# Patient Record
Sex: FEMALE | Race: WHITE | NOT HISPANIC OR LATINO | Employment: FULL TIME | URBAN - METROPOLITAN AREA
[De-identification: names, ages, dates, MRNs, and addresses within clinical notes are randomized per-mention and may not be internally consistent; named-entity substitution may affect disease eponyms.]

---

## 2017-06-23 ENCOUNTER — ALLSCRIPTS OFFICE VISIT (OUTPATIENT)
Dept: OTHER | Facility: OTHER | Age: 22
End: 2017-06-23

## 2017-06-23 ENCOUNTER — APPOINTMENT (OUTPATIENT)
Dept: RADIOLOGY | Facility: MEDICAL CENTER | Age: 22
End: 2017-06-23
Payer: COMMERCIAL

## 2017-06-23 ENCOUNTER — TRANSCRIBE ORDERS (OUTPATIENT)
Dept: ADMINISTRATIVE | Facility: HOSPITAL | Age: 22
End: 2017-06-23

## 2017-06-23 DIAGNOSIS — M25.552 PAIN IN LEFT HIP: ICD-10-CM

## 2017-06-23 DIAGNOSIS — M25.551 PAIN IN RIGHT HIP: ICD-10-CM

## 2017-06-23 PROCEDURE — 73521 X-RAY EXAM HIPS BI 2 VIEWS: CPT

## 2017-06-24 LAB
ANTI-NUCLEAR ANTIBODY (ANA) (HISTORICAL): NEGATIVE
C-REACT.PROTEIN,QUANT (HISTORICAL): 2.1 MG/L (ref 0–4.9)
ERYTHROCYTE SEDIMENTATION RATE (HISTORICAL): 8 MM/HR (ref 0–32)
LYME IGG/IGM AB (HISTORICAL): <0.91 ISR (ref 0–0.9)
LYME IGM (HISTORICAL): <0.8 INDEX (ref 0–0.79)

## 2017-06-26 LAB
BASOPHILS # BLD AUTO: 0.1 X10E3/UL (ref 0–0.2)
BASOPHILS # BLD AUTO: 1 %
CYCLIC CITRULLINATED PEPTIDE ANTIBODY (HISTORICAL): 6 UNITS (ref 0–19)
DEPRECATED RDW RBC AUTO: 12.6 % (ref 12.3–15.4)
EOSINOPHIL # BLD AUTO: 0 %
EOSINOPHIL # BLD AUTO: 0 X10E3/UL (ref 0–0.4)
HCT VFR BLD AUTO: 40.8 % (ref 34–46.6)
HGB BLD-MCNC: 13.8 G/DL (ref 11.1–15.9)
LYMPHOCYTES # BLD AUTO: 2.1 X10E3/UL (ref 0.7–3.1)
LYMPHOCYTES # BLD AUTO: 27 %
MCH RBC QN AUTO: 29.7 PG (ref 26.6–33)
MCHC RBC AUTO-ENTMCNC: 33.8 G/DL (ref 31.5–35.7)
MCV RBC AUTO: 88 FL (ref 79–97)
MONOCYTES # BLD AUTO: 0.8 X10E3/UL (ref 0.1–0.9)
MONOCYTES (HISTORICAL): 10 %
NEUTROPHILS # BLD AUTO: 4.9 X10E3/UL (ref 1.4–7)
NEUTROPHILS # BLD AUTO: 62 %
PLATELET # BLD AUTO: 254 X10E3/UL (ref 150–379)
PLATELET COMMENT (HISTORICAL): NORMAL
RA LATEX TURBID (HISTORICAL): <10 IU/ML (ref 0–13.9)
RBC (HISTORICAL): 4.65 X10E6/UL (ref 3.77–5.28)
RBC COMMENT (HISTORICAL): NORMAL
WBC # BLD AUTO: 7.9 X10E3/UL (ref 3.4–10.8)

## 2017-06-27 ENCOUNTER — GENERIC CONVERSION - ENCOUNTER (OUTPATIENT)
Dept: OTHER | Facility: OTHER | Age: 22
End: 2017-06-27

## 2017-06-28 ENCOUNTER — GENERIC CONVERSION - ENCOUNTER (OUTPATIENT)
Dept: OTHER | Facility: OTHER | Age: 22
End: 2017-06-28

## 2017-09-23 ENCOUNTER — ALLSCRIPTS OFFICE VISIT (OUTPATIENT)
Dept: OTHER | Facility: OTHER | Age: 22
End: 2017-09-23

## 2017-09-29 LAB
ADEQUACY: (HISTORICAL): NORMAL
CHLAMYDIA DFA, NAA OR PCR (HISTORICAL): NEGATIVE
CLINICIAN PROVIDIED ICD 9 OR 10 (HISTORICAL): NORMAL
COMMENT (HISTORICAL): NORMAL
DIAGNOSIS (HISTORICAL): NORMAL
GC, DNA PROB (HISTORICAL): NEGATIVE
HPV HIGH RISK (HISTORICAL): NEGATIVE
NOTE: (HISTORICAL): NORMAL
PERFORMED BY (HISTORICAL): NORMAL
QC REVIEWED BY (HISTORICAL): NORMAL
TEST INFORMATION (HISTORICAL): NORMAL

## 2017-10-27 NOTE — PROGRESS NOTES
Assessment  1  Well female exam with routine gynecological exam (V72 31) (Z01 419)    Plan   · Norgestim-Eth Estrad Triphasic 0 18/0 215/0 25 MG-35 MCG Oral Tablet (Ortho  Tri-Cyclen (28)); take 1 tablet every day   Rx By: Debby Roth; Dispense: 0 Days ; #:1 X 28 Tablet Disp Pack (3 Disp Packs); Refill: 3;For: Irregular menstrual cycle; NELL = N; Verified Transmission to [facility]; Last Updated By: System, Joosy; 9/23/2017 10:02:38 AM   · () PapIG, CtNg, HPV, rfx 16/18; Status:Active; Requested NNE:[id number];    Perform:[facility]; NCS:[id number]; Last Updated Migel Dalton; 9/23/2017 10:04:38 AM;Ordered; For:Well female exam with routine gynecological exam; Ordered By:Jesus Bennett;    Discussion/Summary  health maintenance visit Currently, she eats a healthy diet and has an adequate exercise regimen  the risks and benefits of cervical cancer screening were discussed Pap test with reflex HPV testing was done today Testing was done today for chlamydia and gonorrhea  Breast cancer screening: the risks and benefits of breast cancer screening were discussed and self breast exam technique was taught  Colorectal cancer screening: colorectal cancer screening is not indicated  Osteoporosis screening: bone mineral density testing is not indicated  Advice and education were given regarding nutrition, aerobic exercise, vitamin D supplements and contraception  Patient is a 60-year-old female seen today for a well-woman exam  She is sexually active but she has had the same partner for the past 4 years  She denies any vaginal or urinary complaints  We did do a Pap smear with reflex to include screening for GC and Chlamydia  I refilled her birth control pills and she will return in 1 year  She did not want a flu shot today  Possible side effects of new medications were reviewed with the patient/guardian today  The treatment plan was reviewed with the patient/guardian   The patient/guardian understands and agrees with the treatment plan      Chief Complaint  Patient presents for a GYN exam       History of Present Illness  HPI: Patient has been on birth control for irregular periods - she is happy with this pill - menses are regular, no cramping  is sexually active  She has had the same partner for four years  GYN HM, Adult Female [facility]: The patient is being seen for a gynecology evaluation  General Health: The patient's health since the last visit is described as good  Lifestyle:  She consumes a diverse and healthy diet  -- She exercises regularly  -- She does not use tobacco -- She consumes alcohol -- She denies drug use  -- Goes to the gym three days a week  Reproductive health:  she reports menstrual problems  -- she is sexually active  Screening:      Review of Systems  no pelvic pain,-- no nonmenstrual bleeding-- and-- no dysuria  Active Problems  1  Chronic left hip pain (356 70,315 69) (M25 552,G89 29)   2  Chronic pain of right hip (260 25,525 59) (M25 551,G89 29)   3   Irregular menstrual cycle (626 4) (N92 6)    Past Medical History   · History of Birth control counseling (V25 09) (Z30 09)   · History of Common wart (078 19) (B07 8)   · History of Encounter for birth control pills maintenance (V25 41) (Z30 41)   · History of Ganglion cyst (727 43) (M67 40)   · History of dental abscess (V12 79) (Z87 19)   · History of influenza vaccination (V49 89) (Z92 29)   · History of migraine with aura (V12 49) (Z86 69)   · History of Left knee pain (719 46) (M25 562)   · History of Need for DTaP vaccine (V06 1) (Z23)   · History of Need for prophylactic vaccination and inoculation against influenza (V04 81)  (Z23)   · History of Physical Medicine Office Visit   · History of Screening for depression (V79 0) (Z13 89)    Surgical History   · History of Dental Surgery    Family History   · Family history of No known health problems   · Family history of No known health problems   · Family history of malignant neoplasm of ovary (V16 41) (Z80 41)   · Family history of malignant neoplasm of prostate (V16 42) (Z80 42)   · Family history of skin cancer (V16 8) (Z80 8)    The family history was reviewed and updated today  Social History   · Never a smoker  The social history was reviewed and updated today  The social history was reviewed and is unchanged  Current Meds   1  Diclofenac Sodium 75 MG Oral Tablet Delayed Release; Take 1 tablet twice daily for   pain  take with food; Therapy: 35STC5536 to (Evaluate:15Qka6490)  Requested for: 93IDY9406; Last   Rx:23Jun2017 Ordered   2  Fluticasone Propionate 50 MCG/ACT Nasal Suspension; 2 sprays each nostril daily; Therapy: 51Jff1078 to (Last Rx:57Rnw7998)  Requested for: 89Knj2824 Ordered   3  Norgestim-Eth Estrad Triphasic 0 18/0 215/0 25 MG-35 MCG Oral Tablet; take 1 tablet   every day; Therapy: 39ZEE6998 to (Last Rx:29Blv0360)  Requested for: 54TFB2655 Ordered    Allergies  1  sumatriptan   2  Penicillins   3  Clindamycin HCl CAPS    Vitals   Recorded: 11QGR2299 09:29AM   Temperature 97 9 F, Tympanic   Heart Rate 112   Pulse Quality Normal   Systolic 957, LUE, Sitting   Diastolic 70, LUE, Sitting   Height 5 ft 11 in   Weight 159 lb 5 oz   BMI Calculated 22 22   BSA Calculated 1 91   O2 Saturation 99, RA   LMP 65Gvh2428     Physical Exam    Constitutional   General appearance: No acute distress, well appearing and well nourished  Neck   Neck: Normal, supple, trachea midline, no masses  Thyroid: Normal, no thyromegaly  Pulmonary   Respiratory effort: No increased work of breathing or signs of respiratory distress  Auscultation of lungs: Clear to auscultation  Cardiovascular   Auscultation of heart: Normal rate and rhythm, normal S1 and S2, no murmurs  Peripheral vascular exam: Normal pulses Throughout  Genitourinary   External genitalia: Normal and no lesions appreciated      Vagina: Normal, no lesions or dryness appreciated  Vagina: moderate,-- white-- and-- thick vaginal discharge  Cervix: Normal, no palpable masses  Uterus: Normal, non-tender, not enlarged, and no palpable masses  Adnexa/parametria: Normal, non-tender and no fullness or masses appreciated  Anus, perineum, and rectum: Normal sphincter tone, no masses, and no prolapse  Chest   Breasts: Normal and no dimpling or skin changes noted  Abdomen   Abdomen: Normal, non-tender, and no organomegaly noted  Lymphatic   Palpation of lymph nodes in neck, axillae, groin and/or other locations: No lymphadenopathy or masses noted  Skin   Skin and subcutaneous tissue: Normal skin turgor and no rashes  Psychiatric   Orientation to person, place, and time: Normal     Mood and affect: Normal        Results/Data  PHQ-9 Adult Depression Screening 88Rdz2908 09:40AM User, s     Test Name Result Flag Reference   PHQ-9 Adult Depression Score 2     Over the last two weeks, how often have you been bothered by any of the following problems? Little interest or pleasure in doing things: Not at all - 0  Feeling down, depressed, or hopeless: Not at all - 0  Trouble falling or staying asleep, or sleeping too much: Several days - 1  Feeling tired or having little energy: Several days - 1  Poor appetite or over eating: Not at all - 0  Feeling bad about yourself - or that you are a failure or have let yourself or your family down: Not at all - 0  Trouble concentrating on things, such as reading the newspaper or watching television: Not at all - 0  Moving or speaking so slowly that other people could have noticed   Or the opposite -  being so fidgety or restless that you have been moving around a lot more than usual: Not at all - 0  Thoughts that you would be better off dead, or of hurting yourself in some way: Not at all - 0   PHQ-9 Adult Depression Screening Negative     PHQ-9 Difficulty Level Not difficult at all     PHQ-9 Severity Minimal Depression Signatures   Electronically signed by : Eli Costello DO; Sep 25 2017  6:49AM EST                       (Author)

## 2017-12-14 ENCOUNTER — ALLSCRIPTS OFFICE VISIT (OUTPATIENT)
Dept: OTHER | Facility: OTHER | Age: 22
End: 2017-12-14

## 2017-12-18 ENCOUNTER — ALLSCRIPTS OFFICE VISIT (OUTPATIENT)
Dept: OTHER | Facility: OTHER | Age: 22
End: 2017-12-18

## 2017-12-19 NOTE — PROGRESS NOTES
Assessment  1  AOM (acute otitis media) (382 9) (H65 90)    Plan  AOM (acute otitis media)    · Fluticasone Propionate 50 MCG/ACT Nasal Suspension; USE 2 SPRAYS IN EACHNOSTRIL ONCE DAILY   · LevoFLOXacin 500 MG Oral Tablet; TAKE 1 TABLET DAILY    Discussion/Summary    Patient is a 60-year-old female1  AOM - patient's symptoms today appear likely secondary to biliary infection  Continue with supportive care  Maintain proper hydration  Take over-the-counter Mucinex as well as fluticasone  Start treatment with Levaquin 500 mg daily for 7 days  Follow up if symptoms persist       Chief Complaint  Patient presents for poss sinus infection  Patient C/O ear blockage, pain on left side of face  Patient C/O cough and nausea  Patient denies any vomiting or fevers  History of Present Illness  Cold Symptoms:   Cristo Stanton presents with complaints of gradual onset of constant episodes of mild cold symptoms  Episodes started 6 days ago  Her symptoms are caused by no known event  Symptoms are improved by OTC cold medications, but not by lying down  Associated symptoms include sore throat,-- productive cough,-- facial pressure,-- facial pain,-- headache-- and-- ear pain, but-- no fever-- and-- no chills  Review of Systems   Constitutional: feeling poorly-- and-- feeling tired  ENT: sore throat-- and-- nasal discharge  Cardiovascular: no chest pain-- and-- no palpitations  Active Problems  1  Chronic left hip pain (297 94,644 31) (M25 552,G89 29)   2  Chronic pain of right hip (632 21,331 39) (M25 551,G89 29)   3  Irregular menstrual cycle (626 4) (N92 6)   4  Need for tuberculosis vaccination (V03 2) (Z23)   5  Well female exam with routine gynecological exam (V72 31) (Z01 419)    Past Medical History  1  History of Birth control counseling (V25 09) (Z30 09)   2  History of Common wart (078 19) (B07 8)   3  History of Encounter for birth control pills maintenance (V25 41) (Z30 41)   4   History of Ganglion cyst (727 43) (M67 40)   5  History of dental abscess (V12 79) (Z87 19)   6  History of influenza vaccination (V49 89) (Z92 29)   7  History of migraine with aura (V12 49) (Z86 69)   8  History of Left knee pain (719 46) (M25 562)   9  History of Need for DTaP vaccine (V06 1) (Z23)   10  History of Need for prophylactic vaccination and inoculation against influenza (V04 81)  (Z23)   11  History of Physical Medicine Office Visit   12  History of Screening for depression (V79 0) (Z13 89)    Family History  Mother    1  Family history of No known health problems  Father    2  Family history of No known health problems  Maternal Grandmother    3  Family history of malignant neoplasm of ovary (V16 41) (Z80 41)  Grandfather    4  Family history of malignant neoplasm of prostate (V16 42) (Z80 42)   5  Family history of skin cancer (V16 8) (Z80 8)  Family History Reviewed: The family history was reviewed and updated today  Social History   · Never a smoker  The social history was reviewed and updated today  The social history was reviewed and is unchanged  Surgical History  1  History of Dental Surgery  Surgical History Reviewed: The surgical history was reviewed and updated today  Current Meds   1  Diclofenac Sodium 75 MG Oral Tablet Delayed Release; Take 1 tablet twice daily for pain  take with food; Therapy: 61MRJ3603 to (Evaluate:49Vjy4439)  Requested for: 23NIW4120; Last Rx:23Jun2017 Ordered   2  Fluticasone Propionate 50 MCG/ACT Nasal Suspension; 2 sprays each nostril daily; Therapy: 83Ixe6484 to (Last Rx:36Ihx1329)  Requested for: 96Ocy1097 Ordered   3  Norgestim-Eth Estrad Triphasic 0 18/0 215/0 25 MG-35 MCG Oral Tablet; take 1 tablet every day; Therapy: 62MJU7668 to (Last Rx:72Jrf0844)  Requested for: 29Iad6438 Ordered    The medication list was reviewed and updated today  Allergies  1  sumatriptan   2  Penicillins   3   Clindamycin HCl CAPS    Vitals   Recorded: 58VJN2583 08:15AM   Temperature 99 3 F, Tympanic   Heart Rate 104   Pulse Quality Normal   Respiration Quality Normal   Respiration 16   Systolic 262, LUE, Sitting   Diastolic 72, LUE, Sitting   Height 5 ft 11 26 in   Weight 172 lb 1 oz   BMI Calculated 23 82   BSA Calculated 1 98   O2 Saturation 99, RA   LMP 08OGQ5703   Pain Scale 0     Physical Exam   Constitutional  General appearance: No acute distress, well appearing and well nourished  Eyes  Conjunctiva and lids: No swelling, erythema or discharge  Pupils and irises: Equal, round and reactive to light  Ears, Nose, Mouth, and Throat  External inspection of ears and nose: Normal    Otoscopic examination: Abnormal   The left tympanic membrane was red,-- was bulging-- and-- was retracted  The right external canal was normal  The left external canal was normal   Nasal mucosa, septum, and turbinates: Normal without edema or erythema  Oropharynx: Normal with no erythema, edema, exudate or lesions  Pulmonary  Respiratory effort: No increased work of breathing or signs of respiratory distress  Auscultation of lungs: Clear to auscultation  Cardiovascular  Auscultation of heart: Normal rate and rhythm, normal S1 and S2, without murmurs  Examination of extremities for edema and/or varicosities: Normal    Abdomen  Abdomen: Non-tender, no masses  Liver and spleen: No hepatomegaly or splenomegaly  Lymphatic  Palpation of lymph nodes in neck: No lymphadenopathy  Musculoskeletal  Gait and station: Normal    Inspection/palpation of joints, bones, and muscles: Normal    Skin  Skin and subcutaneous tissue: Normal without rashes or lesions  Signatures   Electronically signed by :  [employer]; Dec 18 2017  9:21AM EST                       (Author)

## 2018-01-12 VITALS
DIASTOLIC BLOOD PRESSURE: 80 MMHG | RESPIRATION RATE: 16 BRPM | HEIGHT: 72 IN | SYSTOLIC BLOOD PRESSURE: 110 MMHG | BODY MASS INDEX: 21.04 KG/M2 | HEART RATE: 90 BPM | TEMPERATURE: 99.3 F | OXYGEN SATURATION: 99 % | WEIGHT: 155.31 LBS

## 2018-01-12 VITALS
HEIGHT: 71 IN | WEIGHT: 159.31 LBS | OXYGEN SATURATION: 99 % | TEMPERATURE: 97.9 F | DIASTOLIC BLOOD PRESSURE: 70 MMHG | BODY MASS INDEX: 22.3 KG/M2 | SYSTOLIC BLOOD PRESSURE: 110 MMHG | HEART RATE: 112 BPM

## 2018-01-13 NOTE — RESULT NOTES
Verified Results  [facility]) CBC+Platelet+Hem Review 01DQF3553 12:00AM [place] [employer]     Test Name Result Flag Reference   WBC 7 9 x10E3/uL  3 4-10 8   RBC 4 65 x10E6/uL  3 77-5 28   Hemoglobin 13 8 g/dL  11 1-15 9   Hematocrit 40 8 %  34 0-46  6   MCV 88 fL  79-97   MCH 29 7 pg  26 6-33 0   MCHC 33 8 g/dL  31 5-35 7   RDW 12 6 %  12 3-15 4   Platelets 010 L59Z0/RP  150-379   Neutrophils 62 %     Lymphs 27 %     Monocytes 10 %     Eos 0 %     Basos 1 %     Neutrophils Absolute 4 9 X10E3/uL  1 4-7 0   Lymphs (Absolute) 2 1 X10E3/uL  0 7-3 1   Monocytes(Absolute) 0 8 X10E3/uL  0 1-0 9   Eos (Absolute Value) 0 0 X10E3/uL  0 0-0 4   Baso(Absolute) 0 1 X10E3/uL  0 0-0 2   RBC Comment RBC's appear normal   Normal   Platelet Comment Comment  Adequate   Platelet count verified by examination of peripheral blood smear  [facility]) Sedimentation Rate-Westergren 64GPT2007 12:00AM [place]     Test Name Result Flag Reference   Sedimentation Rate-Westergren 8 mm/hr  0-32     (1) C-REACTIVE PROTEIN 32EYC3903 12:00AM [facility]     Test Name Result Flag Reference   C-Reactive Protein, Quant 2 1 mg/L  0 0-4 9     (LC) Lyme Ab/Western Blot Reflex 07HKL4950 12:00AM [facility]     Test Name Result Flag Reference   Lyme IgG/IgM Ab <0 91 ISR  0 00-0 90   Negative         <0 91                                                 Equivocal  0 91 - 1 09                                                 Positive         >1 09   Lyme Disease Ab, Quant, IgM <0 80 index  0 00-0 79   Negative         <0 80                                                 Equivocal  0 80 - 1 19                                                 Positive         >1 19                  IgM levels may peak at 3-6 weeks post infection, then                  gradually decline       [facility]) MARCO w/Reflex if Positive 23Jun2017 12:00AM [place] [employer]     Test Name Result Flag Reference   MARCO Direct Negative  Negative     ([facility]) Rheumatoid Arthritis Profile 10MQA6172 12: Kobi Duran     Test Name Result Flag Reference   RA Latex Turbid   <10 0 IU/mL  0 0-13 9   CCP Antibodies IgG/IgA 6 units  0-19   Negative               <20                                           Weak positive      20 - 39                                           Moderate positive  40 - 59                                           Strong positive        >59

## 2018-01-23 VITALS
SYSTOLIC BLOOD PRESSURE: 100 MMHG | HEIGHT: 71 IN | OXYGEN SATURATION: 99 % | RESPIRATION RATE: 16 BRPM | WEIGHT: 172.06 LBS | HEART RATE: 104 BPM | TEMPERATURE: 99.3 F | DIASTOLIC BLOOD PRESSURE: 72 MMHG | BODY MASS INDEX: 24.09 KG/M2

## 2018-01-23 NOTE — RESULT NOTES
Verified Results  ([facility]) PapIG, CtNg, HPV, rfx 16/18 49MPZ4701 12:00AM Yarelisandrew [facility]   No  of containers  Marilia Monterroso 01 ThinPrep Vial     Test Name Result Flag Reference   DIAGNOSIS: Comment     NEGATIVE FOR INTRAEPITHELIAL LESION AND MALIGNANCY  THIS SPECIMEN WAS RESCREENED AS PART OF OUR  PROGRAM    Specimen adequacy: Comment     Satisfactory for evaluation  Endocervical and/or squamous metaplastic  cells (endocervical component) are present  Clinician provided ICD10: Comment     Z01 419   Performed by: Bill Saenz, Cytotechnologist (ASCP)   QC reviewed by: Rosas Esteban, Supervisory Cytotechnologist (ASCP)   Comm   Note: Comment     The Pap smear is a screening test designed to aid in the detection of  premalignant and malignant conditions of the uterine cervix  It is not a  diagnostic procedure and should not be used as the sole means of detecting  cervical cancer  Both false-positive and false-negative reports do occur  Test Methodology: Comment     This liquid based ThinPrep(R) pap test was screened with the  use of an image guided system  HPV, high-risk Negative  Negative   This high-risk HPV test detects thirteen high-risk types  (16/18/31/33/35/39/45/51/52/56/58/59/68) without differentiation  Chlamydia, Nuc  Acid Amp Negative  Negative   Gonococcus, Nuc   Acid Amp Negative  Negative       Plan  Encounter for PPD test    · Tubersol 5 UNIT/0 1ML Intradermal Solution

## 2018-08-01 ENCOUNTER — OFFICE VISIT (OUTPATIENT)
Dept: OBGYN CLINIC | Facility: MEDICAL CENTER | Age: 23
End: 2018-08-01
Payer: COMMERCIAL

## 2018-08-01 VITALS
WEIGHT: 181 LBS | BODY MASS INDEX: 24.52 KG/M2 | HEIGHT: 72 IN | DIASTOLIC BLOOD PRESSURE: 80 MMHG | SYSTOLIC BLOOD PRESSURE: 110 MMHG

## 2018-08-01 DIAGNOSIS — Z30.41 USES ORAL CONTRACEPTION: ICD-10-CM

## 2018-08-01 DIAGNOSIS — Z01.419 ENCOUNTER FOR GYNECOLOGICAL EXAMINATION: Primary | ICD-10-CM

## 2018-08-01 PROCEDURE — S0610 ANNUAL GYNECOLOGICAL EXAMINA: HCPCS | Performed by: OBSTETRICS & GYNECOLOGY

## 2018-08-01 RX ORDER — NORGESTIMATE AND ETHINYL ESTRADIOL 0.25-0.035
1 KIT ORAL
COMMUNITY
End: 2018-08-01 | Stop reason: SDUPTHER

## 2018-08-01 RX ORDER — NORGESTIMATE AND ETHINYL ESTRADIOL 0.25-0.035
1 KIT ORAL DAILY
Qty: 90 TABLET | Refills: 2 | Status: SHIPPED | OUTPATIENT
Start: 2018-08-01 | End: 2019-03-25 | Stop reason: SDUPTHER

## 2018-08-01 NOTE — PROGRESS NOTES
ASSESSMENT & PLAN: Chary Dickson is a 25 y o  [place]ayet[place] Rizo with normal gynecologic exam     1   Routine well woman exam done today  2  Pap and HPV:  The patient's last pap was 2017  It was normal     Pap was not done today  Current ASCCP Guidelines reviewed  3   STD testing  was not done   4  Gardasil recommendations reviewed  is vaccinated  5  The following were reviewed in today's visit: breast self exam, use and side effects of OCPs, family planning choices, exercise and healthy diet      CC:  Annual Gynecologic Examination    HPI: Chary Dickson is a 25 y o  [place]ayet[place] Rizo who presents for annual gynecologic examination  She has the following concerns:  None     Health Maintenance:    She wears her seatbelt routinely  She does perform regular monthly self breast exams  She feels safe at home  History reviewed  No pertinent past medical history  Past Surgical History:   Procedure Laterality Date    DENTAL SURGERY         OB/Gyn History:    Pt does not have menstrual issues  History of sexually transmitted infection: No   History of abnormal pap smears: No      Patient is currently sexually active  The current method of family planning is OCP (estrogen/progesterone)  OB History      Para Term  AB Living    0 0 0 0 0 0    SAB TAB Ectopic Multiple Live Births    0 0 0 0 0          Family History   Problem Relation Age of Onset    No Known Problems Mother     No Known Problems Father        Social History:  Social History     Social History    Marital status: Single     Spouse name: N/A    Number of children: N/A    Years of education: N/A     Occupational History    Not on file       Social History Main Topics    Smoking status: Never Smoker    Smokeless tobacco: Never Used    Alcohol use No    Drug use: No    Sexual activity: Yes     Partners: Male     Birth control/ protection: OCP     Other Topics Concern    Not on file     Social History Narrative    No narrative on file         Allergies   Allergen Reactions    Clindamycin Throat Swelling    Sumatriptan Nausea Only and Throat Swelling    Penicillins Rash         Current Outpatient Prescriptions:     norgestimate-ethinyl estradiol (ORTHO-CYCLEN) 0 25-35 MG-MCG per tablet, Take 1 tablet by mouth daily, Disp: 90 tablet, Rfl: 2    Review of Systems:  Constitutional :no fever, feels well, no tiredness, no recent weight gain or loss  ENT: no ear ache, no loss of hearing, no nosebleeds or nasal discharge, no sore throat or hoarseness  Cardiovascular: no complaints of slow or fast heart beat, no chest pain, no palpitations, no leg claudication or lower extremity edema  Respiratory: no complaints of shortness of shortness of breath, no LUNA  Breasts:no complaints of breast pain, breast lump, or nipple discharge  Gastrointestinal: no complaints of abdominal pain, constipation, nausea, vomiting, or diarrhea or bloody stools  Genitourinary : no complaints of dysuria, incontinence, pelvic pain, no dysmenorrhea, vaginal discharge or abnormal vaginal bleeding and as noted in HPI  Musculoskeletal: no complaints of arthralgia, no myalgia, no joint swelling or stiffness, no limb pain or swelling  Integumentary: no complaints of skin rash or lesion, itching or dry skin  Neurological: no complaints of headache, no confusion, no numbness or tingling, no dizziness or fainting    Objective      /80   Ht 6' (1 829 m)   Wt 82 1 kg (181 lb)   LMP 07/05/2018 (Exact Date)   Breastfeeding?  No   BMI 24 55 kg/m²     General:   appears stated age, cooperative, alert normal mood and affect   Heart: regular rate and rhythm, S1, S2 normal, no murmur, click, rub or gallop   Lungs: clear to auscultation bilaterally   Breasts: normal appearance, no masses or tenderness, Normal to palpation without dominant masses   Abdomen: soft, non-tender, without masses or organomegaly   Vulva: normal   Vagina: normal vagina, no discharge, exudate, lesion, or erythema   Urethra: normal   Cervix: Normal, no discharge  Nontender  Uterus: normal size, contour, position, consistency, mobility, non-tender   Adnexa: no mass, fullness, tenderness   Skin normal skin turgor and no rashes     Psychiatric orientation to person, place, and time: normal  mood and affect: normal

## 2018-08-07 RX ORDER — NORGESTIMATE AND ETHINYL ESTRADIOL 0.25-0.035
1 KIT ORAL DAILY
Qty: 28 TABLET | Refills: 0 | Status: SHIPPED | OUTPATIENT
Start: 2018-08-07 | End: 2019-06-10 | Stop reason: SDUPTHER

## 2019-03-25 DIAGNOSIS — Z30.41 USES ORAL CONTRACEPTION: ICD-10-CM

## 2019-03-25 RX ORDER — NORGESTIMATE AND ETHINYL ESTRADIOL 0.25-0.035
1 KIT ORAL DAILY
Qty: 84 TABLET | Refills: 0 | Status: SHIPPED | OUTPATIENT
Start: 2019-03-25 | End: 2019-07-08 | Stop reason: SDUPTHER

## 2019-06-10 DIAGNOSIS — Z30.41 USES ORAL CONTRACEPTION: ICD-10-CM

## 2019-06-10 RX ORDER — NORGESTIMATE AND ETHINYL ESTRADIOL 0.25-0.035
1 KIT ORAL DAILY
Qty: 28 TABLET | Refills: 1 | Status: SHIPPED | OUTPATIENT
Start: 2019-06-10 | End: 2019-08-19

## 2019-07-08 DIAGNOSIS — Z30.41 USES ORAL CONTRACEPTION: ICD-10-CM

## 2019-07-08 RX ORDER — NORGESTIMATE AND ETHINYL ESTRADIOL 0.25-0.035
1 KIT ORAL DAILY
Qty: 84 TABLET | Refills: 0 | Status: SHIPPED | OUTPATIENT
Start: 2019-07-08 | End: 2019-08-19

## 2019-07-26 ENCOUNTER — OFFICE VISIT (OUTPATIENT)
Dept: URGENT CARE | Facility: MEDICAL CENTER | Age: 24
End: 2019-07-26
Payer: COMMERCIAL

## 2019-07-26 VITALS
TEMPERATURE: 98 F | DIASTOLIC BLOOD PRESSURE: 70 MMHG | HEART RATE: 73 BPM | SYSTOLIC BLOOD PRESSURE: 140 MMHG | BODY MASS INDEX: 24.38 KG/M2 | WEIGHT: 180 LBS | RESPIRATION RATE: 16 BRPM | HEIGHT: 72 IN | OXYGEN SATURATION: 100 %

## 2019-07-26 DIAGNOSIS — R30.0 DYSURIA: ICD-10-CM

## 2019-07-26 DIAGNOSIS — N30.90 CYSTITIS: Primary | ICD-10-CM

## 2019-07-26 LAB
SL AMB  POCT GLUCOSE, UA: NEGATIVE
SL AMB LEUKOCYTE ESTERASE,UA: ABNORMAL
SL AMB POCT BILIRUBIN,UA: NEGATIVE
SL AMB POCT BLOOD,UA: NEGATIVE
SL AMB POCT CLARITY,UA: CLEAR
SL AMB POCT COLOR,UA: ABNORMAL
SL AMB POCT KETONES,UA: NEGATIVE
SL AMB POCT NITRITE,UA: ABNORMAL
SL AMB POCT PH,UA: 8
SL AMB POCT SPECIFIC GRAVITY,UA: 1
SL AMB POCT URINE PROTEIN: NEGATIVE
SL AMB POCT UROBILINOGEN: 0.2

## 2019-07-26 PROCEDURE — 87086 URINE CULTURE/COLONY COUNT: CPT

## 2019-07-26 PROCEDURE — G0383 LEV 4 HOSP TYPE B ED VISIT: HCPCS | Performed by: FAMILY MEDICINE

## 2019-07-26 PROCEDURE — 81002 URINALYSIS NONAUTO W/O SCOPE: CPT

## 2019-07-26 RX ORDER — NITROFURANTOIN 25; 75 MG/1; MG/1
100 CAPSULE ORAL 2 TIMES DAILY
Qty: 14 CAPSULE | Refills: 0 | Status: SHIPPED | OUTPATIENT
Start: 2019-07-26 | End: 2019-08-02

## 2019-07-26 NOTE — PROGRESS NOTES
[facility]        NAME: Sanjeev Hodgson is a 26 y o  female  : 1995    MRN: [id number]  DATE: 2019  TIME: 1:10 PM    Assessment and Plan   Cystitis [N30 90]  1  Cystitis  nitrofurantoin (MACROBID) 100 mg capsule   2  Dysuria  POCT urine dip    Urine culture    nitrofurantoin (MACROBID) 100 mg capsule         Patient Instructions       Follow up with PCP in 3-5 days  Proceed to  ER if symptoms worsen  Chief Complaint     Chief Complaint   Patient presents with    Possible UTI     since monday, frequency and burning         History of Present Illness       26 y o  female who complains of urinary frequency, urgency and dysuria x 5 days, without flank pain, fever, chills, or abnormal vaginal discharge or bleeding  Review of Systems   Review of Systems  As above     Current Medications       Current Outpatient Medications:     nitrofurantoin (MACROBID) 100 mg capsule, Take 1 capsule (100 mg total) by mouth 2 (two) times a day for 7 days, Disp: 14 capsule, Rfl: 0    norgestimate-ethinyl estradiol (ORTHO-CYCLEN) 0 25-35 MG-MCG per tablet, Take 1 tablet by mouth daily, Disp: 28 tablet, Rfl: 1    norgestimate-ethinyl estradiol (ORTHO-CYCLEN) 0 25-35 MG-MCG per tablet, Take 1 tablet by mouth daily, Disp: 84 tablet, Rfl: 0    Current Allergies     Allergies as of 2019 - Reviewed 2019   Allergen Reaction Noted    Clindamycin Throat Swelling     Sumatriptan Nausea Only and Throat Swelling 2016    Penicillins Rash 2012            The following portions of the patient's history were reviewed and updated as appropriate: allergies, current medications, past family history, past medical history, past social history, past surgical history and problem list      History reviewed  No pertinent past medical history      Past Surgical History:   Procedure Laterality Date    DENTAL SURGERY         Family History   Problem Relation Age of Onset    No Known Problems Mother  No Known Problems Father          Medications have been verified  Objective   /70   Pulse 73   Temp 98 °F (36 7 °C) (Tympanic)   Resp 16   Ht 6' (1 829 m)   Wt 81 6 kg (180 lb)   SpO2 100%   BMI 24 41 kg/m²        Physical Exam     Physical Exam   Constitutional: She is oriented to person, place, and time  She appears well-developed and well-nourished  HENT:   Head: Normocephalic and atraumatic  Mouth/Throat: Oropharynx is clear and moist    Eyes: Conjunctivae are normal    Neck: Neck supple  Cardiovascular: Normal rate, regular rhythm and normal heart sounds  Pulmonary/Chest: Effort normal and breath sounds normal  No respiratory distress  She has no wheezes  She has no rales  Abdominal: Soft  Musculoskeletal: Normal range of motion  Neurological: She is alert and oriented to person, place, and time  Skin: Skin is warm and dry  Psychiatric: She has a normal mood and affect  Her behavior is normal    Nursing note and vitals reviewed

## 2019-07-27 LAB — BACTERIA UR CULT: NORMAL

## 2019-08-19 ENCOUNTER — ANNUAL EXAM (OUTPATIENT)
Dept: OBGYN CLINIC | Facility: MEDICAL CENTER | Age: 24
End: 2019-08-19
Payer: COMMERCIAL

## 2019-08-19 VITALS
HEIGHT: 72 IN | DIASTOLIC BLOOD PRESSURE: 72 MMHG | WEIGHT: 185 LBS | BODY MASS INDEX: 25.06 KG/M2 | SYSTOLIC BLOOD PRESSURE: 116 MMHG

## 2019-08-19 DIAGNOSIS — Z01.419 ENCOUNTER FOR GYNECOLOGICAL EXAMINATION: Primary | ICD-10-CM

## 2019-08-19 DIAGNOSIS — Z30.41 USES ORAL CONTRACEPTION: ICD-10-CM

## 2019-08-19 PROCEDURE — 99395 PREV VISIT EST AGE 18-39: CPT | Performed by: OBSTETRICS & GYNECOLOGY

## 2019-08-19 RX ORDER — NORGESTIMATE AND ETHINYL ESTRADIOL 0.25-0.035
1 KIT ORAL DAILY
Qty: 84 TABLET | Refills: 3 | Status: SHIPPED | OUTPATIENT
Start: 2019-08-19 | End: 2020-08-17 | Stop reason: SDUPTHER

## 2019-08-19 NOTE — PROGRESS NOTES
ASSESSMENT & PLAN: Nish Ludwig is a 22 y o  [place] with normal gynecologic exam     1   Routine well woman exam done today  2  Pap:  The patient's last pap was 2017  It was normal     Pap was not done today  Current ASCCP Guidelines reviewed  3   STD testing  was not done   4  Gardasil recommendations reviewed   5  The following were reviewed in today's visit: breast self exam, use and side effects of OCPs, family planning choices, exercise and healthy diet    CC:  Annual Gynecologic Examination    HPI: Nish Ludwig is a 22 y o  [place] who presents for annual gynecologic examination  She has the following concerns:  None     Health Maintenance:    She wears her seatbelt routinely  She does perform regular monthly self breast exams  She feels safe at home  History reviewed  No pertinent past medical history  Past Surgical History:   Procedure Laterality Date    DENTAL SURGERY         OB/Gyn History:    Pt does not have menstrual issues  History of sexually transmitted infection: No   History of abnormal pap smears: No      Patient is currently sexually active  The current method of family planning is OCP (estrogen/progesterone)      OB History        0    Para   0    Term   0       0    AB   0    Living   0       SAB   0    TAB   0    Ectopic   0    Multiple   0    Live Births   0                 Family History   Problem Relation Age of Onset    No Known Problems Mother     No Known Problems Father        Social History:  Social History     Socioeconomic History    Marital status: Single     Spouse name: Not on file    Number of children: Not on file    Years of education: Not on file    Highest education level: Not on file   Occupational History    Not on file   Social Needs    Financial resource strain: Not on file    Food insecurity:     Worry: Not on file     Inability: Not on file    Transportation needs:     Medical: Not on file Non-medical: Not on file   Tobacco Use    Smoking status: Never Smoker    Smokeless tobacco: Never Used   Substance and Sexual Activity    Alcohol use: Yes     Comment: social    Drug use: No    Sexual activity: Yes     Partners: Male     Birth control/protection: OCP   Lifestyle    Physical activity:     Days per week: Not on file     Minutes per session: Not on file    Stress: Not on file   Relationships    Social connections:     Talks on phone: Not on file     Gets together: Not on file     Attends Yarsani service: Not on file     Active member of club or organization: Not on file     Attends meetings of clubs or organizations: Not on file     Relationship status: Not on file    Intimate partner violence:     Fear of current or ex partner: Not on file     Emotionally abused: Not on file     Physically abused: Not on file     Forced sexual activity: Not on file   Other Topics Concern    Not on file   Social History Narrative    Not on file         Allergies   Allergen Reactions    Clindamycin Throat Swelling    Sumatriptan Nausea Only and Throat Swelling    Penicillins Rash         Current Outpatient Medications:     norgestimate-ethinyl estradiol (ORTHO-CYCLEN) 0 25-35 MG-MCG per tablet, Take 1 tablet by mouth daily, Disp: 84 tablet, Rfl: 3    Review of Systems:  Constitutional :no fever, feels well, no tiredness, no recent weight gain or loss  ENT: no ear ache, no loss of hearing, no nosebleeds or nasal discharge, no sore throat or hoarseness  Cardiovascular: no complaints of slow or fast heart beat, no chest pain, no palpitations, no leg claudication or lower extremity edema    Respiratory: no complaints of shortness of shortness of breath, no LUNA  Breasts:no complaints of breast pain, breast lump, or nipple discharge  Gastrointestinal: no complaints of abdominal pain, constipation, nausea, vomiting, or diarrhea or bloody stools  Genitourinary : no complaints of dysuria, incontinence, pelvic pain, no dysmenorrhea, vaginal discharge or abnormal vaginal bleeding and as noted in HPI  Musculoskeletal: no complaints of arthralgia, no myalgia, no joint swelling or stiffness, no limb pain or swelling  Integumentary: no complaints of skin rash or lesion, itching or dry skin  Neurological: no complaints of headache, no confusion, no numbness or tingling, no dizziness or fainting    Objective      /72   Ht 6' (1 829 m)   Wt 83 9 kg (185 lb)   LMP 08/07/2019   BMI 25 09 kg/m²     General:   appears stated age, cooperative, alert normal mood and affect   Heart: regular rate and rhythm, S1, S2 normal, no murmur, click, rub or gallop   Lungs: clear to auscultation bilaterally   Breasts: normal appearance, no masses or tenderness   Abdomen: soft, non-tender, without masses or organomegaly   Vulva: normal   Vagina: normal vagina, no discharge, exudate, lesion, or erythema   Urethra: normal   Cervix: Normal, no discharge  Nontender     Uterus: normal size, contour, position, consistency, mobility, non-tender   Adnexa: no mass, fullness, tenderness   Psychiatric orientation to person, place, and time: normal  mood and affect: normal

## 2019-11-08 ENCOUNTER — OFFICE VISIT (OUTPATIENT)
Dept: FAMILY MEDICINE CLINIC | Facility: CLINIC | Age: 24
End: 2019-11-08
Payer: COMMERCIAL

## 2019-11-08 VITALS
BODY MASS INDEX: 26.11 KG/M2 | TEMPERATURE: 97.8 F | DIASTOLIC BLOOD PRESSURE: 82 MMHG | HEIGHT: 72 IN | WEIGHT: 192.8 LBS | OXYGEN SATURATION: 98 % | RESPIRATION RATE: 16 BRPM | HEART RATE: 101 BPM | SYSTOLIC BLOOD PRESSURE: 116 MMHG

## 2019-11-08 DIAGNOSIS — Z23 NEED FOR TDAP VACCINATION: ICD-10-CM

## 2019-11-08 DIAGNOSIS — Z00.00 PREVENTATIVE HEALTH CARE: Primary | ICD-10-CM

## 2019-11-08 DIAGNOSIS — Z11.1 SCREENING FOR TUBERCULOSIS: ICD-10-CM

## 2019-11-08 PROCEDURE — 90471 IMMUNIZATION ADMIN: CPT | Performed by: FAMILY MEDICINE

## 2019-11-08 PROCEDURE — 99395 PREV VISIT EST AGE 18-39: CPT | Performed by: FAMILY MEDICINE

## 2019-11-08 PROCEDURE — 86580 TB INTRADERMAL TEST: CPT | Performed by: FAMILY MEDICINE

## 2019-11-08 PROCEDURE — 90715 TDAP VACCINE 7 YRS/> IM: CPT | Performed by: FAMILY MEDICINE

## 2019-11-08 NOTE — PROGRESS NOTES
Assessment/Plan:   1  Preventative health care  Patient appears well today  Reviewed health maintenance measures with her  She she is up-to-date with her immunizations except for her Adacel vaccine  This was given today  She needs a PPD placed today for her school requirements  Follow-up in 48-72 hours to repeat this test   She was encouraged to continue with a strict diet and exercise plan  Continue with regular follow-up with her gyn  Follow-up in 1 year or p r n  2  Need for Tdap vaccination  - TDAP VACCINE GREATER THAN OR EQUAL TO 6YO IM    3  Screening for tuberculosis  - TB Skin Test           There are no diagnoses linked to this encounter  Subjective:       Chief Complaint   Patient presents with    Physical Exam     PPD       Patient ID: Syed Diaz is a 22 y o  female  Syed Diaz presents for health maintenance visit   22 y o  Female  [employer] student for speech path    He/she states that  level of health is:  good     Dental issues :no    Vision issues: no    Hearing issues: no    Up-to-date on immunizations: yes    Diet: good     Exercise:  intermittently    Tobacco: no    ETOH: Minimal     Illegal drugs: no          Review of Systems   Constitutional: Negative for activity change, chills, fatigue and fever  HENT: Negative for congestion, ear pain, sinus pressure and sore throat  Eyes: Negative for redness, itching and visual disturbance  Respiratory: Negative for cough and shortness of breath  Cardiovascular: Negative for chest pain and palpitations  Gastrointestinal: Negative for abdominal pain, diarrhea and nausea  Endocrine: Negative for cold intolerance and heat intolerance  Genitourinary: Negative for dysuria, flank pain and frequency  Musculoskeletal: Negative for arthralgias, back pain, gait problem and myalgias  Skin: Negative for color change  Allergic/Immunologic: Negative for environmental allergies     Neurological: Negative for dizziness, numbness and headaches  Psychiatric/Behavioral: Negative for behavioral problems and sleep disturbance  The following portions of the patient's history were reviewed and updated as appropriate : past family history, past medical history, past social history and past surgical history  Current Outpatient Medications:     norgestimate-ethinyl estradiol (ORTHO-CYCLEN) 0 25-35 MG-MCG per tablet, Take 1 tablet by mouth daily, Disp: 84 tablet, Rfl: 3         Objective:         Vitals:    11/08/19 0857   BP: 116/82   BP Location: Left arm   Patient Position: Sitting   Cuff Size: Adult   Pulse: 101   Resp: 16   Temp: 97 8 °F (36 6 °C)   TempSrc: Tympanic   SpO2: 98%   Weight: 87 5 kg (192 lb 12 8 oz)   Height: 6' (1 829 m)     Physical Exam   Constitutional: She is oriented to person, place, and time  She appears well-developed and well-nourished  HENT:   Head: Normocephalic and atraumatic  Nose: Nose normal    Mouth/Throat: No oropharyngeal exudate  Eyes: Pupils are equal, round, and reactive to light  Right eye exhibits no discharge  Left eye exhibits no discharge  Neck: Normal range of motion  Neck supple  No tracheal deviation present  Cardiovascular: Normal rate, regular rhythm and intact distal pulses  Exam reveals no gallop and no friction rub  No murmur heard  Pulses:       Dorsalis pedis pulses are 2+ on the right side, and 2+ on the left side  Posterior tibial pulses are 2+ on the right side, and 2+ on the left side  Pulmonary/Chest: Effort normal and breath sounds normal  No respiratory distress  She has no wheezes  She has no rales  Abdominal: Soft  Bowel sounds are normal  She exhibits no distension  There is no tenderness  There is no rebound and no guarding  Musculoskeletal: Normal range of motion  She exhibits no edema  Lymphadenopathy:        Head (right side): No submental and no submandibular adenopathy present          Head (left side): No submental and no submandibular adenopathy present  She has no cervical adenopathy  Right cervical: No superficial cervical, no deep cervical and no posterior cervical adenopathy present  Left cervical: No superficial cervical, no deep cervical and no posterior cervical adenopathy present  Neurological: She is alert and oriented to person, place, and time  No cranial nerve deficit or sensory deficit  Skin: Skin is warm, dry and intact  Psychiatric: Her speech is normal and behavior is normal  Judgment normal  Her mood appears not anxious  Cognition and memory are normal  She does not exhibit a depressed mood  Vitals reviewed

## 2019-11-11 ENCOUNTER — CLINICAL SUPPORT (OUTPATIENT)
Dept: FAMILY MEDICINE CLINIC | Facility: CLINIC | Age: 24
End: 2019-11-11

## 2019-11-11 DIAGNOSIS — Z11.1 ENCOUNTER FOR PPD SKIN TEST READING: Primary | ICD-10-CM

## 2019-11-11 LAB
INDURATION: 0 MM
TB SKIN TEST: NEGATIVE

## 2020-03-04 ENCOUNTER — TELEPHONE (OUTPATIENT)
Dept: FAMILY MEDICINE CLINIC | Facility: CLINIC | Age: 25
End: 2020-03-04

## 2020-03-04 NOTE — TELEPHONE ENCOUNTER
Pt called in and needs TB results from Nov 2019 faxed over to [facility]      Faxing to [phone]

## 2020-07-20 ENCOUNTER — OFFICE VISIT (OUTPATIENT)
Dept: FAMILY MEDICINE CLINIC | Facility: CLINIC | Age: 25
End: 2020-07-20
Payer: COMMERCIAL

## 2020-07-20 VITALS
OXYGEN SATURATION: 98 % | SYSTOLIC BLOOD PRESSURE: 110 MMHG | HEIGHT: 72 IN | TEMPERATURE: 97.9 F | DIASTOLIC BLOOD PRESSURE: 72 MMHG | BODY MASS INDEX: 25.52 KG/M2 | WEIGHT: 188.4 LBS | RESPIRATION RATE: 16 BRPM | HEART RATE: 101 BPM

## 2020-07-20 DIAGNOSIS — Z00.00 ANNUAL PHYSICAL EXAM: ICD-10-CM

## 2020-07-20 DIAGNOSIS — Z11.1 ENCOUNTER FOR PPD TEST: Primary | ICD-10-CM

## 2020-07-20 PROCEDURE — 86580 TB INTRADERMAL TEST: CPT | Performed by: FAMILY MEDICINE

## 2020-07-20 PROCEDURE — 99395 PREV VISIT EST AGE 18-39: CPT | Performed by: FAMILY MEDICINE

## 2020-07-20 NOTE — PROGRESS NOTES
ADULT ANNUAL [address] [facility]    NAME: Johana Agn  AGE: 22 y o  SEX: female  : 1995     DATE: 2020     Assessment and Plan:     Problem List Items Addressed This Visit     None      Visit Diagnoses     Encounter for PPD test    -  Primary    Relevant Orders    TB Skin Test (Completed)    Annual physical exam        BMI 25 0-25 9,adult              Immunizations and preventive care screenings were discussed with patient today  Appropriate education was printed on patient's after visit summary  Counseling:  Alcohol/drug use: discussed moderation in alcohol intake, the recommendations for healthy alcohol use, and avoidance of illicit drug use  Dental Health: discussed importance of regular tooth brushing, flossing, and dental visits  · Exercise: the importance of regular exercise/physical activity was discussed  Recommend exercise 3-5 times per week for at least 30 minutes  BMI Counseling: Body mass index is 25 91 kg/m²  The BMI is above normal  Nutrition recommendations include decreasing portion sizes, encouraging healthy choices of fruits and vegetables, decreasing fast food intake, consuming healthier snacks and limiting drinks that contain sugar  Exercise recommendations include moderate physical activity 150 minutes/week and exercising 3-5 times per week  No pharmacotherapy was ordered  Return in 1 year (on 2021)  Chief Complaint:     Chief Complaint   Patient presents with    Physical Exam     WORK PE AND TB TEST       History of Present Illness:     Adult Annual Physical   Patient here for a comprehensive physical exam  The patient reports no problems  Diet and Physical Activity  · Diet/Nutrition: well balanced diet  · Exercise: 3-4 times a week on average        Depression Screening  PHQ-9 Depression Screening    PHQ-9:    Frequency of the following problems over the past two weeks: Little interest or pleasure in doing things:  0 - not at all  Feeling down, depressed, or hopeless:  0 - not at all  PHQ-2 Score:  0       General Health  · Sleep: sleeps well  · Hearing: normal - bilateral   · Vision: no vision problems  · Dental: regular dental visits  Review of Systems:     Review of Systems   Constitutional: Negative for activity change, chills, fatigue and fever  HENT: Negative for congestion, ear pain, sinus pressure and sore throat  Eyes: Negative for redness, itching and visual disturbance  Respiratory: Negative for cough and shortness of breath  Cardiovascular: Negative for chest pain and palpitations  Gastrointestinal: Negative for abdominal pain, diarrhea and nausea  Endocrine: Negative for cold intolerance and heat intolerance  Genitourinary: Negative for dysuria, flank pain and frequency  Musculoskeletal: Negative for arthralgias, back pain, gait problem and myalgias  Skin: Negative for color change  Allergic/Immunologic: Negative for environmental allergies  Neurological: Negative for dizziness, numbness and headaches  Psychiatric/Behavioral: Negative for behavioral problems and sleep disturbance        Past Medical History:     Past Medical History:   Diagnosis Date    Common wart     Resolved 7/29/2016     Ganglion cyst     Resolved 7/29/2016     Migraine with aura     Resolved 12/27/2016       Past Surgical History:     Past Surgical History:   Procedure Laterality Date    DENTAL SURGERY        Social History:        Social History     Socioeconomic History    Marital status: Single     Spouse name: None    Number of children: None    Years of education: None    Highest education level: None   Occupational History    None   Social Needs    Financial resource strain: None    Food insecurity:     Worry: None     Inability: None    Transportation needs:     Medical: None     Non-medical: None   Tobacco Use    Smoking status: Never Smoker    Smokeless tobacco: Never Used   Substance and Sexual Activity    Alcohol use: Yes     Comment: social    Drug use: No    Sexual activity: Yes     Partners: Male     Birth control/protection: OCP   Lifestyle    Physical activity:     Days per week: None     Minutes per session: None    Stress: None   Relationships    Social connections:     Talks on phone: None     Gets together: None     Attends Mu-ism service: None     Active member of club or organization: None     Attends meetings of clubs or organizations: None     Relationship status: None    Intimate partner violence:     Fear of current or ex partner: None     Emotionally abused: None     Physically abused: None     Forced sexual activity: None   Other Topics Concern    None   Social History Narrative    None      Family History:     Family History   Problem Relation Age of Onset    No Known Problems Mother     No Known Problems Father     Ovarian cancer Maternal Grandmother     Prostate cancer Family     Skin cancer Family       Current Medications:     Current Outpatient Medications   Medication Sig Dispense Refill    norgestimate-ethinyl estradiol (ORTHO-CYCLEN) 0 25-35 MG-MCG per tablet Take 1 tablet by mouth daily 84 tablet 3     No current facility-administered medications for this visit  Allergies: Allergies   Allergen Reactions    Clindamycin Throat Swelling    Sumatriptan Nausea Only and Throat Swelling    Penicillins Rash      Physical Exam:     /72 (BP Location: Left arm, Patient Position: Sitting, Cuff Size: Adult)   Pulse 101   Temp 97 9 °F (36 6 °C) (Tympanic)   Resp 16   Ht 5' 11 5" (1 816 m)   Wt 85 5 kg (188 lb 6 4 oz)   LMP 06/29/2020   SpO2 98%   BMI 25 91 kg/m²     Physical Exam   Constitutional: She is oriented to person, place, and time  She appears well-developed and well-nourished  HENT:   Head: Normocephalic and atraumatic     Nose: Nose normal    Mouth/Throat: No oropharyngeal exudate  Eyes: Pupils are equal, round, and reactive to light  Right eye exhibits no discharge  Left eye exhibits no discharge  Neck: Normal range of motion  Neck supple  No tracheal deviation present  Cardiovascular: Normal rate, regular rhythm and intact distal pulses  Exam reveals no gallop and no friction rub  No murmur heard  Pulses:       Dorsalis pedis pulses are 2+ on the right side, and 2+ on the left side  Posterior tibial pulses are 2+ on the right side, and 2+ on the left side  Pulmonary/Chest: Effort normal and breath sounds normal  No respiratory distress  She has no wheezes  She has no rales  Abdominal: Soft  Bowel sounds are normal  She exhibits no distension  There is no tenderness  There is no rebound and no guarding  Musculoskeletal: Normal range of motion  She exhibits no edema  Lymphadenopathy:        Head (right side): No submental and no submandibular adenopathy present  Head (left side): No submental and no submandibular adenopathy present  She has no cervical adenopathy  Right cervical: No superficial cervical, no deep cervical and no posterior cervical adenopathy present  Left cervical: No superficial cervical, no deep cervical and no posterior cervical adenopathy present  Neurological: She is alert and oriented to person, place, and time  No cranial nerve deficit or sensory deficit  Skin: Skin is warm, dry and intact  Psychiatric: Her speech is normal and behavior is normal  Judgment normal  Her mood appears not anxious  Cognition and memory are normal  She does not exhibit a depressed mood  Vitals reviewed        Saul Alexander DO   [facility]

## 2020-07-20 NOTE — PATIENT INSTRUCTIONS

## 2020-07-22 ENCOUNTER — CLINICAL SUPPORT (OUTPATIENT)
Dept: FAMILY MEDICINE CLINIC | Facility: CLINIC | Age: 25
End: 2020-07-22

## 2020-07-22 DIAGNOSIS — Z11.1 ENCOUNTER FOR PPD SKIN TEST READING: Primary | ICD-10-CM

## 2020-07-22 LAB
INDURATION: 0 MM
TB SKIN TEST: NEGATIVE

## 2020-08-17 ENCOUNTER — ANNUAL EXAM (OUTPATIENT)
Dept: OBGYN CLINIC | Facility: MEDICAL CENTER | Age: 25
End: 2020-08-17
Payer: COMMERCIAL

## 2020-08-17 VITALS
BODY MASS INDEX: 25.87 KG/M2 | HEIGHT: 72 IN | TEMPERATURE: 98.6 F | DIASTOLIC BLOOD PRESSURE: 80 MMHG | SYSTOLIC BLOOD PRESSURE: 120 MMHG | WEIGHT: 191 LBS

## 2020-08-17 DIAGNOSIS — Z01.419 ENCOUNTER FOR ANNUAL ROUTINE GYNECOLOGICAL EXAMINATION: Primary | ICD-10-CM

## 2020-08-17 DIAGNOSIS — Z30.41 USES ORAL CONTRACEPTION: ICD-10-CM

## 2020-08-17 DIAGNOSIS — Z01.419 ENCOUNTER FOR WELL WOMAN EXAM WITH ROUTINE GYNECOLOGICAL EXAM: ICD-10-CM

## 2020-08-17 PROCEDURE — 3008F BODY MASS INDEX DOCD: CPT | Performed by: OBSTETRICS & GYNECOLOGY

## 2020-08-17 PROCEDURE — 1036F TOBACCO NON-USER: CPT | Performed by: OBSTETRICS & GYNECOLOGY

## 2020-08-17 PROCEDURE — 99395 PREV VISIT EST AGE 18-39: CPT | Performed by: OBSTETRICS & GYNECOLOGY

## 2020-08-17 RX ORDER — NORGESTIMATE AND ETHINYL ESTRADIOL 0.25-0.035
1 KIT ORAL DAILY
Qty: 84 TABLET | Refills: 3 | Status: SHIPPED | OUTPATIENT
Start: 2020-08-17 | End: 2021-03-08 | Stop reason: SDUPTHER

## 2020-08-17 NOTE — PROGRESS NOTES
ASSESSMENT & PLAN: Debbie Mays is a 26 y o  Almfrancisco Pratt with normal gynecologic exam     1   Routine well woman exam done today  2  Pap:  The patient's last pap was 2017  It was normal     Pap was done today  Current ASCCP Guidelines reviewed  =  3  STD testing  was not done   4  Gardasil recommendations reviewed  is vaccinated  5  The following were reviewed in today's visit: breast self exam, family planning choices, exercise and healthy diet      CC:  Annual Gynecologic Examination    HPI: Debbie Mays is a 26 y o  Almfrancisco Pratt who presents for annual gynecologic examination  She has the following concerns:  None     Health Maintenance:    She wears her seatbelt routinely  She does perform regular monthly self breast exams  She feels safe at home  Past Medical History:   Diagnosis Date    Common wart     Resolved 2016     Ganglion cyst     Resolved 2016     Migraine with aura     Resolved 2016        Past Surgical History:   Procedure Laterality Date    DENTAL SURGERY         OB/Gyn History:    Pt does not have menstrual issues  History of sexually transmitted infection: No   History of abnormal pap smears: No =  Patient is currently sexually active  The current method of family planning is OCP (estrogen/progesterone)      OB History        0    Para   0    Term   0       0    AB   0    Living   0       SAB   0    TAB   0    Ectopic   0    Multiple   0    Live Births   0                 Family History   Problem Relation Age of Onset    No Known Problems Mother     No Known Problems Father     Ovarian cancer Maternal Grandmother     Prostate cancer Family     Skin cancer Family        Social History:  Social History     Socioeconomic History    Marital status: Single     Spouse name: Not on file    Number of children: Not on file    Years of education: Not on file    Highest education level: Not on file   Occupational History    Not on file   Social Needs    Financial resource strain: Not on file    Food insecurity     Worry: Not on file     Inability: Not on file    Transportation needs     Medical: Not on file     Non-medical: Not on file   Tobacco Use    Smoking status: Never Smoker    Smokeless tobacco: Never Used   Substance and Sexual Activity    Alcohol use: Yes     Comment: social    Drug use: No    Sexual activity: Yes     Partners: Male     Birth control/protection: OCP   Lifestyle    Physical activity     Days per week: Not on file     Minutes per session: Not on file    Stress: Not on file   Relationships    Social connections     Talks on phone: Not on file     Gets together: Not on file     Attends Nondenominational service: Not on file     Active member of club or organization: Not on file     Attends meetings of clubs or organizations: Not on file     Relationship status: Not on file    Intimate partner violence     Fear of current or ex partner: Not on file     Emotionally abused: Not on file     Physically abused: Not on file     Forced sexual activity: Not on file   Other Topics Concern    Not on file   Social History Narrative    Not on file         Allergies   Allergen Reactions    Clindamycin Throat Swelling    Sumatriptan Nausea Only and Throat Swelling    Penicillins Rash         Current Outpatient Medications:     norgestimate-ethinyl estradiol (ORTHO-CYCLEN) 0 25-35 MG-MCG per tablet, Take 1 tablet by mouth daily, Disp: 84 tablet, Rfl: 3    Review of Systems:  Constitutional :no fever, feels well, no tiredness, no recent weight gain or loss  ENT: no ear ache, no loss of hearing, no nosebleeds or nasal discharge, no sore throat or hoarseness  Cardiovascular: no complaints of slow or fast heart beat, no chest pain, no palpitations, no leg claudication or lower extremity edema    Respiratory: no complaints of shortness of shortness of breath, no LUNA  Breasts:no complaints of breast pain, breast lump, or nipple discharge  Gastrointestinal: no complaints of abdominal pain, constipation, nausea, vomiting, or diarrhea or bloody stools  Genitourinary : no complaints of dysuria, incontinence, pelvic pain, no dysmenorrhea, vaginal discharge or abnormal vaginal bleeding and as noted in HPI  Musculoskeletal: no complaints of arthralgia, no myalgia, no joint swelling or stiffness, no limb pain or swelling  Integumentary: no complaints of skin rash or lesion, itching or dry skin  Neurological: no complaints of headache, no confusion, no numbness or tingling, no dizziness or fainting    Objective      /80   Temp 98 6 °F (37 °C)   Ht 6' (1 829 m)   Wt 86 6 kg (191 lb)   LMP 08/06/2020 (Approximate)   BMI 25 90 kg/m²     General:   appears stated age, cooperative, alert normal mood and affect   Lungs: Unlabored breathing    Breasts: normal appearance, no masses or tenderness   Abdomen: soft, non-tender, without masses or organomegaly   Vulva: normal   Vagina: normal vagina, no discharge, exudate, lesion, or erythema   Urethra: normal   Cervix: Normal, no discharge  Nontender  Uterus: normal size, contour, position, consistency, mobility, non-tender   Adnexa: no mass, fullness, tenderness   Lymphatic palpation of lymph nodes in neck, axilla, groin and/or other locations: no lymphadenopathy or masses noted   Skin normal skin turgor and no rashes     Psychiatric orientation to person, place, and time: normal  mood and affect: normal

## 2020-08-18 LAB
CLINICAL INFO: NORMAL
CYTO CVX: NORMAL
DATE PREVIOUS BX: NORMAL
LMP START DATE: NORMAL
SL AMB PREV. PAP:: NORMAL
SPECIMEN SOURCE CVX/VAG CYTO: NORMAL

## 2021-03-08 ENCOUNTER — TELEPHONE (OUTPATIENT)
Dept: OBGYN CLINIC | Facility: MEDICAL CENTER | Age: 26
End: 2021-03-08

## 2021-03-08 DIAGNOSIS — Z30.41 USES ORAL CONTRACEPTION: ICD-10-CM

## 2021-03-08 NOTE — TELEPHONE ENCOUNTER
Patient recently obtained new insurance and needs a new script of her ortho-cyclen to be sent to the following pharmacy instead of the current pharmacy on file       [facility]  [address] [facility], [address] [place] [address]

## 2021-03-09 RX ORDER — NORGESTIMATE AND ETHINYL ESTRADIOL 0.25-0.035
1 KIT ORAL DAILY
Qty: 84 TABLET | Refills: 1 | Status: SHIPPED | OUTPATIENT
Start: 2021-03-09 | End: 2021-07-16 | Stop reason: SDUPTHER

## 2021-07-16 ENCOUNTER — TELEPHONE (OUTPATIENT)
Dept: OBGYN CLINIC | Facility: MEDICAL CENTER | Age: 26
End: 2021-07-16

## 2021-07-16 DIAGNOSIS — Z30.41 USES ORAL CONTRACEPTION: ICD-10-CM

## 2021-07-16 RX ORDER — NORGESTIMATE AND ETHINYL ESTRADIOL 0.25-0.035
1 KIT ORAL DAILY
Qty: 84 TABLET | Refills: 1 | Status: SHIPPED | OUTPATIENT
Start: 2021-07-16 | End: 2021-11-09

## 2021-07-26 NOTE — RESULT NOTES
Verified Results  * XR HIPS BILATERAL 2 VW W PELVIS IF PERFORMED [id number] 02:28PM Manuelito Carias    Order Number: [id number]     Test Name Result Flag Reference   XR HIPS BILATERAL WITH AP PELVIS (Report)     BILATERAL HIPS AND PELVIS     INDICATION: Chronic bilateral lateral hip pain on and off for 4-6 months  COMPARISON: None     VIEWS: AP pelvis and coned down views of each hip     IMAGES: 5      FINDINGS:     No acute pelvic fracture or pathologic bone lesions  Visualized bony pelvis appears intact  LEFT HIP:   No significant degenerative changes  Bony alignment is maintained  Soft tissues are unremarkable  RIGHT HIP:   No significant degenerative changes  Bony alignment is maintained  Soft tissues are unremarkable  IMPRESSION:     Unremarkable hips and pelvis         Workstation performed: [id number]     Signed by:   Laurita Fitzpatrick DO   6/28/17 [Joint Pain] : joint pain [Negative] : Heme/Lymph

## 2021-09-15 ENCOUNTER — OFFICE VISIT (OUTPATIENT)
Dept: URGENT CARE | Facility: CLINIC | Age: 26
End: 2021-09-15
Payer: COMMERCIAL

## 2021-09-15 VITALS
WEIGHT: 195 LBS | BODY MASS INDEX: 26.41 KG/M2 | DIASTOLIC BLOOD PRESSURE: 78 MMHG | TEMPERATURE: 98.2 F | OXYGEN SATURATION: 99 % | HEART RATE: 96 BPM | HEIGHT: 72 IN | SYSTOLIC BLOOD PRESSURE: 106 MMHG | RESPIRATION RATE: 18 BRPM

## 2021-09-15 DIAGNOSIS — M54.12 RADICULOPATHY OF CERVICAL SPINE: Primary | ICD-10-CM

## 2021-09-15 PROCEDURE — G0382 LEV 3 HOSP TYPE B ED VISIT: HCPCS | Performed by: PHYSICIAN ASSISTANT

## 2021-09-15 NOTE — PROGRESS NOTES
[facility]        NAME: Yrn aLdd is a 23 y o  female  : 1995    MRN: [id number]  DATE: September 15, 2021  TIME: 4:58 PM    Assessment and Plan   Radiculopathy of cervical spine [M54 12]  1  Radiculopathy of cervical spine  Ambulatory Referral to Comprehensive Spine Program    Ambulatory referral to Orthopedic Surgery         Patient Instructions     Encourage follow-up with PCP for possible Lyme testing  Referred to physical therapy and Orthopedics   Continue with over-the-counter supportive care including ibuprofen, stretching, etc   Follow up with PCP in 3-5 days  Proceed to  ER if symptoms worsen  Chief Complaint     Chief Complaint   Patient presents with    Arm Pain     Monday developed right shoulder pain  Took Advil with no relief  Tuesday developed generalized right axillary swelling and pain; pain gradually radiated to joint of RUE  Today edema has waned but sl presence persists and is currently painfree         History of Present Illness        Patient presents with complaint of intermittent right upper extremity joint pain the past few days  She denies any known injury reports associated intermittent paresthesias in her forearm and hand  She denies any neck pain and states that she 1st noticed the symptoms after work 1 day  She denies weakness but reports armpit tenderness and swelling  Patient notes that she was bitten by a tick a few months ago but denies it being attached for 36 hours  She reports taking ibuprofen on Monday but denies any improvement  Review of Systems   Review of Systems   Constitutional: Negative for chills and fever  HENT: Negative for ear pain and sore throat  Eyes: Negative for pain and visual disturbance  Respiratory: Negative for cough and shortness of breath  Cardiovascular: Negative for chest pain and palpitations  Gastrointestinal: Negative for abdominal pain and vomiting     Genitourinary: Negative for dysuria and hematuria  Musculoskeletal: Positive for arthralgias  Negative for back pain  Skin: Negative for color change and rash  Neurological: Negative for seizures and syncope  All other systems reviewed and are negative  Current Medications       Current Outpatient Medications:     norgestimate-ethinyl estradiol (ORTHO-CYCLEN) 0 25-35 MG-MCG per tablet, Take 1 tablet by mouth daily, Disp: 84 tablet, Rfl: 1    Current Allergies     Allergies as of 09/15/2021 - Reviewed 09/15/2021   Allergen Reaction Noted    Clindamycin Throat Swelling     Sumatriptan Nausea Only and Throat Swelling 08/09/2016    Penicillins Rash 07/05/2012            The following portions of the patient's history were reviewed and updated as appropriate: allergies, current medications, past family history, past medical history, past social history, past surgical history and problem list      Past Medical History:   Diagnosis Date    Common wart     Resolved 7/29/2016     Ganglion cyst     Resolved 7/29/2016     Migraine with aura     Resolved 12/27/2016        Past Surgical History:   Procedure Laterality Date    DENTAL SURGERY         Family History   Problem Relation Age of Onset    No Known Problems Mother     No Known Problems Father     Ovarian cancer Maternal Grandmother     Prostate cancer Family     Skin cancer Family          Medications have been verified  Objective   /78   Pulse 96   Temp 98 2 °F (36 8 °C)   Resp 18   Ht 6' (1 829 m)   Wt 88 5 kg (195 lb)   SpO2 99%   BMI 26 45 kg/m²   No LMP recorded  Physical Exam     Physical Exam  Vitals and nursing note reviewed  Constitutional:       General: She is not in acute distress  Appearance: Normal appearance  She is well-developed  She is not ill-appearing or diaphoretic  HENT:      Head: Normocephalic and atraumatic  Eyes:      Conjunctiva/sclera: Conjunctivae normal       Pupils: Pupils are equal, round, and reactive to light  Cardiovascular:      Rate and Rhythm: Normal rate and regular rhythm  Heart sounds: Normal heart sounds  Pulmonary:      Effort: Pulmonary effort is normal  No respiratory distress  Breath sounds: Normal breath sounds  No stridor  No wheezing, rhonchi or rales  Musculoskeletal:         General: Swelling and tenderness present  Normal range of motion  Cervical back: Normal range of motion and neck supple  Comments: Right shoulder, elbow, wrist without skin changes and with full range of motion; nontender to palpation   distal sensation intact   Generalized swelling and tender to palpation in right armpit, no erythema or other skin changes   Lymphadenopathy:      Cervical: No cervical adenopathy  Skin:     General: Skin is warm and dry  Capillary Refill: Capillary refill takes less than 2 seconds  Findings: No bruising, erythema or rash  Neurological:      Mental Status: She is alert and oriented to person, place, and time  Cranial Nerves: No cranial nerve deficit  Sensory: No sensory deficit  Psychiatric:         Behavior: Behavior normal          Thought Content:  Thought content normal

## 2021-09-21 ENCOUNTER — OFFICE VISIT (OUTPATIENT)
Dept: FAMILY MEDICINE CLINIC | Facility: CLINIC | Age: 26
End: 2021-09-21
Payer: COMMERCIAL

## 2021-09-21 VITALS
TEMPERATURE: 98 F | DIASTOLIC BLOOD PRESSURE: 80 MMHG | HEIGHT: 72 IN | RESPIRATION RATE: 17 BRPM | SYSTOLIC BLOOD PRESSURE: 102 MMHG | BODY MASS INDEX: 26.68 KG/M2 | HEART RATE: 109 BPM | WEIGHT: 197 LBS | OXYGEN SATURATION: 99 %
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
## 2021-09-21 DIAGNOSIS — M79.89 RIGHT AXILLARY SWELLING: Primary | ICD-10-CM

## 2021-09-21 DIAGNOSIS — L30.9 DERMATITIS: ICD-10-CM

## 2021-09-21 DIAGNOSIS — B07.9 VERRUCA VULGARIS: ICD-10-CM

## 2021-09-21 PROCEDURE — 17110 DESTRUCTION B9 LES UP TO 14: CPT | Performed by: FAMILY MEDICINE

## 2021-09-21 PROCEDURE — 3008F BODY MASS INDEX DOCD: CPT | Performed by: FAMILY MEDICINE

## 2021-09-21 PROCEDURE — 3725F SCREEN DEPRESSION PERFORMED: CPT | Performed by: FAMILY MEDICINE

## 2021-09-21 PROCEDURE — 1036F TOBACCO NON-USER: CPT | Performed by: FAMILY MEDICINE

## 2021-09-21 PROCEDURE — 99214 OFFICE O/P EST MOD 30 MIN: CPT | Performed by: FAMILY MEDICINE

## 2021-09-21 RX ORDER — TRIAMCINOLONE ACETONIDE 5 MG/G
CREAM TOPICAL 2 TIMES DAILY
Qty: 30 G | Refills: 0 | Status: SHIPPED | OUTPATIENT
Start: 2021-09-21 | End: 2021-11-09

## 2021-09-21 RX ORDER — DOXYCYCLINE 100 MG/1
100 TABLET ORAL 2 TIMES DAILY
Qty: 14 TABLET | Refills: 0 | Status: SHIPPED | OUTPATIENT
Start: 2021-09-21 | End: 2021-09-28

## 2021-09-21 NOTE — PROGRESS NOTES
Assessment/Plan:   1  Right axillary swelling/Tick bite, initial encounter    Reviewed patient's symptoms today  At this time, is unclear as to exact cause of her axillary swelling  Her symptoms may likely be a secondary to a possible reactive lymph node  She denies any fevers or chills today  At this time, will check blood work including her CBC, CMP as well as Lyme antibody profile  Start treatment empirically with doxycycline 100 mg b i d  for 7 days  - CBC and differential; Future  - Lyme Antibody Profile with reflex to WB; Future  - Comprehensive metabolic panel; Future  - doxycycline (ADOXA) 100 MG tablet; Take 1 tablet (100 mg total) by mouth 2 (two) times a day for 7 days  Dispense: 14 tablet; Refill: 0  - CBC and differential  - Lyme Antibody Profile with reflex to WB  - Comprehensive metabolic panel    2  Dermatitis    Symptoms appear likely secondary to a possible dermatitis  It is unclear as to exact cause  Will start treatment with triamcinolone cream   - triamcinolone (KENALOG) 0 5 % cream; Apply topically 2 (two) times a day  Dispense: 30 g; Refill: 0    3  Verruca vulgaris    Patient tolerated cryo treatment very well  Will follow up with patient in 2 weeks if her symptoms are persisting  BMI Counseling: Body mass index is 26 72 kg/m²  The BMI is above normal  Nutrition recommendations include decreasing portion sizes, encouraging healthy choices of fruits and vegetables, decreasing fast food intake, consuming healthier snacks and limiting drinks that contain sugar  Exercise recommendations include moderate physical activity 150 minutes/week and exercising 3-5 times per week  No pharmacotherapy was ordered  Patient referred to PCP  Rationale for BMI follow-up plan is due to patient being overweight or obese  Depression Screening and Follow-up Plan:   Patient was screened for depression during today's encounter  They screened negative with a PHQ-2 score of 0            There are no diagnoses linked to this encounter  Subjective:       Chief Complaint   Patient presents with    Rash     L wrist     Edema     armpit     Joint Pain     shoulders sxs started Monday       Patient ID: Beth Johnson is a 23 y o  female  Presents today with multiple complaints  She states that she was recently in the urgent care secondary to right axillary swelling  She states her symptoms started gradually  She did note moderate to severe swelling over the past few days  This was spreading into her right superior breast   She states that the pain did subside with time  She is concerned because she did have a tick bite a over 1 month ago  She denies any further symptoms from this tick bite  Rash  This is a new problem  The current episode started more than 1 month ago  The affected locations include the left wrist  The rash is characterized by redness and itchiness  She was exposed to nothing  Pertinent negatives include no congestion, cough, diarrhea, fatigue, fever, shortness of breath or sore throat  Past treatments include nothing  Review of Systems   Constitutional: Negative for activity change, chills, fatigue and fever  HENT: Negative for congestion, ear pain, sinus pressure and sore throat  Eyes: Negative for redness, itching and visual disturbance  Respiratory: Negative for cough and shortness of breath  Cardiovascular: Negative for chest pain and palpitations  Gastrointestinal: Negative for abdominal pain, diarrhea and nausea  Endocrine: Negative for cold intolerance and heat intolerance  Genitourinary: Negative for dysuria, flank pain and frequency  Musculoskeletal: Negative for arthralgias, back pain, gait problem and myalgias  Skin: Positive for rash  Negative for color change  Allergic/Immunologic: Negative for environmental allergies  Neurological: Negative for dizziness, numbness and headaches     Psychiatric/Behavioral: Negative for behavioral problems and sleep disturbance  The following portions of the patient's history were reviewed and updated as appropriate : past family history, past medical history, past social history and past surgical history  Current Outpatient Medications:     norgestimate-ethinyl estradiol (ORTHO-CYCLEN) 0 25-35 MG-MCG per tablet, Take 1 tablet by mouth daily, Disp: 84 tablet, Rfl: 1         Objective:         Vitals:    09/21/21 1017   BP: 102/80   BP Location: Left arm   Patient Position: Sitting   Cuff Size: Adult   Pulse: (!) 109   Resp: 17   Temp: 98 °F (36 7 °C)   TempSrc: Temporal   SpO2: 99%   Weight: 89 4 kg (197 lb)   Height: 6' (1 829 m)       Physical Exam  Vitals reviewed  Constitutional:       Appearance: She is well-developed  HENT:      Head: Normocephalic and atraumatic  Nose: Nose normal       Mouth/Throat:      Pharynx: No oropharyngeal exudate  Eyes:      General: No scleral icterus  Right eye: No discharge  Left eye: No discharge  Pupils: Pupils are equal, round, and reactive to light  Neck:      Trachea: No tracheal deviation  Cardiovascular:      Rate and Rhythm: Normal rate and regular rhythm  Pulses:           Dorsalis pedis pulses are 2+ on the right side and 2+ on the left side  Posterior tibial pulses are 2+ on the right side and 2+ on the left side  Heart sounds: Normal heart sounds  No murmur heard  No friction rub  No gallop  Pulmonary:      Effort: Pulmonary effort is normal  No respiratory distress  Breath sounds: Normal breath sounds  No wheezing or rales  Abdominal:      General: Bowel sounds are normal  There is no distension  Palpations: Abdomen is soft  Tenderness: There is no abdominal tenderness  There is no guarding or rebound  Musculoskeletal:         General: Normal range of motion  Cervical back: Normal range of motion and neck supple     Lymphadenopathy:      Head:      Right side of head: No submental or submandibular adenopathy  Left side of head: No submental or submandibular adenopathy  Cervical: No cervical adenopathy  Right cervical: No superficial, deep or posterior cervical adenopathy  Left cervical: No superficial, deep or posterior cervical adenopathy  Skin:     General: Skin is warm and dry  Findings: No erythema  Neurological:      Mental Status: She is alert and oriented to person, place, and time  Cranial Nerves: No cranial nerve deficit  Sensory: No sensory deficit  Psychiatric:         Mood and Affect: Mood is not anxious or depressed  Speech: Speech normal          Behavior: Behavior normal          Thought Content: Thought content normal          Judgment: Judgment normal        Lesion Destruction    Date/Time: 9/21/2021 10:57 AM  Performed by: Alisha Colindres DO  Authorized by: Alisha Colindres DO   Universal Protocol:  Consent: Verbal consent obtained    Consent given by: patient  Patient identity confirmed: verbally with patient      Procedure Details - Lesion Destruction:     Number of Lesions:  1  Lesion 1:     Body area:  Upper extremity    Upper extremity location:  L index finger    Initial size (mm):  2    Final defect size (mm):  2    Malignancy: benign lesion      Destruction method: cryotherapy

## 2021-09-22 LAB
ALBUMIN SERPL-MCNC: 4.3 G/DL (ref 3.6–5.1)
ALBUMIN/GLOB SERPL: 1.6 (CALC) (ref 1–2.5)
ALP SERPL-CCNC: 62 U/L (ref 31–125)
ALT SERPL-CCNC: 24 U/L (ref 6–29)
AST SERPL-CCNC: 25 U/L (ref 10–30)
B BURGDOR AB SER IA-ACNC: <0.9 INDEX
BASOPHILS # BLD AUTO: 33 CELLS/UL (ref 0–200)
BASOPHILS NFR BLD AUTO: 0.4 %
BILIRUB SERPL-MCNC: 0.5 MG/DL (ref 0.2–1.2)
BUN SERPL-MCNC: 8 MG/DL (ref 7–25)
BUN/CREAT SERPL: NORMAL (CALC) (ref 6–22)
CALCIUM SERPL-MCNC: 9.7 MG/DL (ref 8.6–10.2)
CHLORIDE SERPL-SCNC: 104 MMOL/L (ref 98–110)
CO2 SERPL-SCNC: 26 MMOL/L (ref 20–32)
CREAT SERPL-MCNC: 0.65 MG/DL (ref 0.5–1.1)
EOSINOPHIL # BLD AUTO: 83 CELLS/UL (ref 15–500)
EOSINOPHIL NFR BLD AUTO: 1 %
ERYTHROCYTE [DISTWIDTH] IN BLOOD BY AUTOMATED COUNT: 12.1 % (ref 11–15)
GLOBULIN SER CALC-MCNC: 2.7 G/DL (CALC) (ref 1.9–3.7)
GLUCOSE SERPL-MCNC: 90 MG/DL (ref 65–139)
HCT VFR BLD AUTO: 40.4 % (ref 35–45)
HGB BLD-MCNC: 13.2 G/DL (ref 11.7–15.5)
LYMPHOCYTES # BLD AUTO: 2830 CELLS/UL (ref 850–3900)
LYMPHOCYTES NFR BLD AUTO: 34.1 %
MCH RBC QN AUTO: 29.1 PG (ref 27–33)
MCHC RBC AUTO-ENTMCNC: 32.7 G/DL (ref 32–36)
MCV RBC AUTO: 89 FL (ref 80–100)
MONOCYTES # BLD AUTO: 714 CELLS/UL (ref 200–950)
MONOCYTES NFR BLD AUTO: 8.6 %
NEUTROPHILS # BLD AUTO: 4640 CELLS/UL (ref 1500–7800)
NEUTROPHILS NFR BLD AUTO: 55.9 %
PLATELET # BLD AUTO: 279 THOUSAND/UL (ref 140–400)
PMV BLD REES-ECKER: 11.2 FL (ref 7.5–12.5)
POTASSIUM SERPL-SCNC: 4.5 MMOL/L (ref 3.5–5.3)
PROT SERPL-MCNC: 7 G/DL (ref 6.1–8.1)
RBC # BLD AUTO: 4.54 MILLION/UL (ref 3.8–5.1)
SL AMB EGFR AFRICAN AMERICAN: 143 ML/MIN/1.73M2
SL AMB EGFR NON AFRICAN AMERICAN: 123 ML/MIN/1.73M2
SODIUM SERPL-SCNC: 139 MMOL/L (ref 135–146)
WBC # BLD AUTO: 8.3 THOUSAND/UL (ref 3.8–10.8)

## 2021-09-28 ENCOUNTER — TELEPHONE (OUTPATIENT)
Dept: PHYSICAL THERAPY | Facility: OTHER | Age: 26
End: 2021-09-28

## 2021-10-04 ENCOUNTER — OFFICE VISIT (OUTPATIENT)
Dept: FAMILY MEDICINE CLINIC | Facility: CLINIC | Age: 26
End: 2021-10-04
Payer: COMMERCIAL

## 2021-10-04 VITALS
DIASTOLIC BLOOD PRESSURE: 86 MMHG | OXYGEN SATURATION: 99 % | SYSTOLIC BLOOD PRESSURE: 114 MMHG | RESPIRATION RATE: 16 BRPM | HEART RATE: 83 BPM | HEIGHT: 71 IN | TEMPERATURE: 98.6 F | WEIGHT: 197.8 LBS | BODY MASS INDEX: 27.69 KG/M2

## 2021-10-04 DIAGNOSIS — Z11.1 SCREENING-PULMONARY TB: ICD-10-CM

## 2021-10-04 DIAGNOSIS — Z00.00 ANNUAL PHYSICAL EXAM: Primary | ICD-10-CM

## 2021-10-04 DIAGNOSIS — R22.31 MASS OF RIGHT AXILLA: ICD-10-CM

## 2021-10-04 PROCEDURE — 1036F TOBACCO NON-USER: CPT | Performed by: FAMILY MEDICINE

## 2021-10-04 PROCEDURE — 86580 TB INTRADERMAL TEST: CPT | Performed by: FAMILY MEDICINE

## 2021-10-04 PROCEDURE — 3008F BODY MASS INDEX DOCD: CPT | Performed by: FAMILY MEDICINE

## 2021-10-04 PROCEDURE — 99395 PREV VISIT EST AGE 18-39: CPT | Performed by: FAMILY MEDICINE

## 2021-10-06 ENCOUNTER — CLINICAL SUPPORT (OUTPATIENT)
Dept: FAMILY MEDICINE CLINIC | Facility: CLINIC | Age: 26
End: 2021-10-06

## 2021-10-06 DIAGNOSIS — Z11.1 ENCOUNTER FOR PPD TEST: Primary | ICD-10-CM

## 2021-10-06 LAB
INDURATION: 0 MM
TB SKIN TEST: NEGATIVE

## 2021-10-12 ENCOUNTER — HOSPITAL ENCOUNTER (OUTPATIENT)
Dept: ULTRASOUND IMAGING | Facility: MEDICAL CENTER | Age: 26
Discharge: HOME | End: 2021-10-12
Payer: COMMERCIAL

## 2021-10-12 DIAGNOSIS — R22.31 MASS OF RIGHT AXILLA: ICD-10-CM

## 2021-10-12 PROCEDURE — 76882 US LMTD JT/FCL EVL NVASC XTR: CPT

## 2021-11-03 ENCOUNTER — RA CDI HCC (OUTPATIENT)
Dept: OTHER | Facility: HOSPITAL | Age: 26
End: 2021-11-03

## 2021-11-09 ENCOUNTER — OFFICE VISIT (OUTPATIENT)
Dept: FAMILY MEDICINE CLINIC | Facility: CLINIC | Age: 26
End: 2021-11-09
Payer: COMMERCIAL

## 2021-11-09 VITALS
HEART RATE: 79 BPM | TEMPERATURE: 98.5 F | DIASTOLIC BLOOD PRESSURE: 86 MMHG | SYSTOLIC BLOOD PRESSURE: 122 MMHG | BODY MASS INDEX: 26.07 KG/M2 | WEIGHT: 186.2 LBS | OXYGEN SATURATION: 99 % | RESPIRATION RATE: 18 BRPM | HEIGHT: 71 IN

## 2021-11-09 DIAGNOSIS — D22.9 ATYPICAL NEVI: ICD-10-CM

## 2021-11-09 DIAGNOSIS — R22.31 MASS OF RIGHT AXILLA: Primary | ICD-10-CM

## 2021-11-09 PROCEDURE — 99213 OFFICE O/P EST LOW 20 MIN: CPT | Performed by: FAMILY MEDICINE

## 2021-11-17 ENCOUNTER — CONSULT (OUTPATIENT)
Dept: DERMATOLOGY | Facility: CLINIC | Age: 26
End: 2021-11-17
Payer: COMMERCIAL

## 2021-11-17 VITALS — TEMPERATURE: 98.4 F | WEIGHT: 181 LBS | BODY MASS INDEX: 25.34 KG/M2 | HEIGHT: 71 IN

## 2021-11-17 DIAGNOSIS — D23.5: ICD-10-CM

## 2021-11-17 PROCEDURE — 1036F TOBACCO NON-USER: CPT | Performed by: DERMATOLOGY

## 2021-11-17 PROCEDURE — 99203 OFFICE O/P NEW LOW 30 MIN: CPT | Performed by: DERMATOLOGY

## 2021-11-17 PROCEDURE — 3008F BODY MASS INDEX DOCD: CPT | Performed by: DERMATOLOGY

## 2021-11-18 LAB
ALBUMIN SERPL-MCNC: 4.3 G/DL (ref 3.6–5.1)
ALBUMIN/GLOB SERPL: 2 (CALC) (ref 1–2.5)
ALP SERPL-CCNC: 45 U/L (ref 31–125)
ALT SERPL-CCNC: 36 U/L (ref 6–29)
ANA PAT SER IF-IMP: ABNORMAL
ANA SER QL IF: POSITIVE
ANA TITR SER IF: ABNORMAL TITER
AST SERPL-CCNC: 30 U/L (ref 10–30)
B BURGDOR AB SER IA-ACNC: <0.9 INDEX
B HENSELAE IGG SER QL: NEGATIVE
B HENSELAE IGM SER QL: NEGATIVE
BASOPHILS # BLD AUTO: 30 CELLS/UL (ref 0–200)
BASOPHILS NFR BLD AUTO: 0.5 %
BILIRUB SERPL-MCNC: 0.9 MG/DL (ref 0.2–1.2)
BUN SERPL-MCNC: 8 MG/DL (ref 7–25)
BUN/CREAT SERPL: ABNORMAL (CALC) (ref 6–22)
CALCIUM SERPL-MCNC: 9.3 MG/DL (ref 8.6–10.2)
CHLORIDE SERPL-SCNC: 105 MMOL/L (ref 98–110)
CO2 SERPL-SCNC: 27 MMOL/L (ref 20–32)
CREAT SERPL-MCNC: 0.64 MG/DL (ref 0.5–1.1)
EOSINOPHIL # BLD AUTO: 112 CELLS/UL (ref 15–500)
EOSINOPHIL NFR BLD AUTO: 1.9 %
ERYTHROCYTE [DISTWIDTH] IN BLOOD BY AUTOMATED COUNT: 12.8 % (ref 11–15)
ERYTHROCYTE [SEDIMENTATION RATE] IN BLOOD BY WESTERGREN METHOD: 2 MM/H
GLOBULIN SER CALC-MCNC: 2.2 G/DL (CALC) (ref 1.9–3.7)
GLUCOSE SERPL-MCNC: 116 MG/DL (ref 65–139)
HCT VFR BLD AUTO: 39.9 % (ref 35–45)
HGB BLD-MCNC: 13.4 G/DL (ref 11.7–15.5)
HIV 1+2 AB+HIV1 P24 AG SERPL QL IA: NORMAL
LDH SERPL-CCNC: 134 U/L (ref 100–200)
LDH1 CFR SERPL ELPH: 28 % (ref 19–38)
LDH2 CFR SERPL ELPH: 34 % (ref 30–43)
LDH3 CFR SERPL ELPH: 21 % (ref 16–26)
LDH4 CFR SERPL ELPH: 8 % (ref 3–12)
LDH5 CFR SERPL ELPH: 10 % (ref 3–14)
LYMPHOCYTES # BLD AUTO: 2354 CELLS/UL (ref 850–3900)
LYMPHOCYTES NFR BLD AUTO: 39.9 %
MCH RBC QN AUTO: 29.4 PG (ref 27–33)
MCHC RBC AUTO-ENTMCNC: 33.6 G/DL (ref 32–36)
MCV RBC AUTO: 87.5 FL (ref 80–100)
MONOCYTES # BLD AUTO: 513 CELLS/UL (ref 200–950)
MONOCYTES NFR BLD AUTO: 8.7 %
NEUTROPHILS # BLD AUTO: 2891 CELLS/UL (ref 1500–7800)
NEUTROPHILS NFR BLD AUTO: 49 %
PLATELET # BLD AUTO: 233 THOUSAND/UL (ref 140–400)
PMV BLD REES-ECKER: 11.8 FL (ref 7.5–12.5)
POTASSIUM SERPL-SCNC: 4.5 MMOL/L (ref 3.5–5.3)
PROT SERPL-MCNC: 6.5 G/DL (ref 6.1–8.1)
RBC # BLD AUTO: 4.56 MILLION/UL (ref 3.8–5.1)
RPR SER QL: NORMAL
SL AMB EGFR AFRICAN AMERICAN: 144 ML/MIN/1.73M2
SL AMB EGFR NON AFRICAN AMERICAN: 124 ML/MIN/1.73M2
SODIUM SERPL-SCNC: 137 MMOL/L (ref 135–146)
WBC # BLD AUTO: 5.9 THOUSAND/UL (ref 3.8–10.8)

## 2021-11-19 DIAGNOSIS — R59.1 LYMPHADENOPATHY: ICD-10-CM

## 2021-11-19 DIAGNOSIS — R76.8 POSITIVE ANA (ANTINUCLEAR ANTIBODY): Primary | ICD-10-CM

## 2022-01-25 DIAGNOSIS — N92.6 MISSED MENSES: Primary | ICD-10-CM

## 2022-01-25 NOTE — PROGRESS NOTES
Patient called with positive pregnancy test  lmp 12/25/21  4w3d  Advised patient to get this blood work completed in 2 weeks  All questions answered   Will come into office to grab lab slips for [facility]

## 2022-02-09 LAB
B-HCG SERPL-ACNC: NORMAL MIU/ML
PROGEST SERPL-MCNC: 23.8 NG/ML

## 2022-02-21 ENCOUNTER — ULTRASOUND (OUTPATIENT)
Dept: OBGYN CLINIC | Facility: CLINIC | Age: 27
End: 2022-02-21
Payer: COMMERCIAL

## 2022-02-21 VITALS — SYSTOLIC BLOOD PRESSURE: 115 MMHG | DIASTOLIC BLOOD PRESSURE: 80 MMHG | WEIGHT: 180 LBS | BODY MASS INDEX: 25.1 KG/M2

## 2022-02-21 DIAGNOSIS — N92.6 MISSED MENSES: Primary | ICD-10-CM

## 2022-02-21 PROCEDURE — 1036F TOBACCO NON-USER: CPT | Performed by: OBSTETRICS & GYNECOLOGY

## 2022-02-21 PROCEDURE — 76801 OB US < 14 WKS SINGLE FETUS: CPT | Performed by: OBSTETRICS & GYNECOLOGY

## 2022-02-21 PROCEDURE — 99214 OFFICE O/P EST MOD 30 MIN: CPT | Performed by: OBSTETRICS & GYNECOLOGY

## 2022-02-21 NOTE — PROGRESS NOTES
Mitchell Pacheco was seen today for pregnancy ultrasound  Diagnoses and all orders for this visit:    Missed menses  -     AMB US OB < 14 weeks single or first gestation level 1         Plan  US today showing live IUP     EDC based on US as final EDC of 10/8/2022 as there was a difference based on EDC from LMP   We discussed using unisom and vitamin B6 for nausea   To call if unable to tolerate po / or experiences bleeding  We briefly discussed her OB care and genetic testing   Patient will return for OB intake   Prenatal  vitamins encouraged     Alex Bedolla is a 23 y o  female here for missed menses and positive pregnancy test  States LMP 12/25/2021  Was not trying to conceive but was also not preventing  States feeling tired and nauseous  Tolerating liquid and food, denies vomiting    Patient Active Problem List   Diagnosis    Mass of right axilla    Annual physical exam    Atypical nevi       Gynecologic History  Patient's last menstrual period was 12/25/2021  The current method of family planning is none  Past Medical History:   Diagnosis Date    Common wart     Resolved 7/29/2016     Ganglion cyst     Resolved 7/29/2016     Migraine with aura     Resolved 12/27/2016      Past Surgical History:   Procedure Laterality Date    DENTAL SURGERY       Family History   Problem Relation Age of Onset    No Known Problems Mother     No Known Problems Father     Ovarian cancer Maternal Grandmother     Prostate cancer Family     Skin cancer Family      Social History     Socioeconomic History    Marital status: /Civil Union     Spouse name: Not on file    Number of children: Not on file    Years of education: Not on file    Highest education level: Not on file   Occupational History    Not on file   Tobacco Use    Smoking status: Never Smoker    Smokeless tobacco: Never Used   Vaping Use    Vaping Use: Never used   Substance and Sexual Activity    Alcohol use:  Yes Comment: social    Drug use: No    Sexual activity: Yes     Partners: Male     Birth control/protection: OCP   Other Topics Concern    Not on file   Social History Narrative    Not on file     Social Determinants of Health     Financial Resource Strain: Not on file   Food Insecurity: Not on file   Transportation Needs: Not on file   Physical Activity: Not on file   Stress: Not on file   Social Connections: Not on file   Intimate Partner Violence: Not on file   Housing Stability: Not on file     Allergies   Allergen Reactions    Clindamycin Throat Swelling    Sumatriptan Nausea Only and Throat Swelling    Penicillins Rash     No current outpatient medications on file  Review of Systems  Constitutional :no fever, feels well, no tiredness, no recent weight gain or loss, Positive for Nausea    ENT: no ear ache, no loss of hearing, no nosebleeds or nasal discharge, no sore throat or hoarseness  Cardiovascular: no complaints of slow or fast heart beat, no chest pain, no palpitations, no leg claudication or lower extremity edema  Respiratory: no complaints of shortness of shortness of breath, no LUNA  Breasts:no complaints of breast pain, breast lump, or nipple discharge  Gastrointestinal: no complaints of abdominal pain, constipation, nausea, vomiting, or diarrhea or bloody stools  Genitourinary : no complaints of dysuria, incontinence, pelvic pain, no dysmenorrhea, vaginal discharge or abnormal vaginal bleeding and as noted in HPI  Musculoskeletal: no complaints of arthralgia, no myalgia, no joint swelling or stiffness, no limb pain or swelling    Integumentary: no complaints of skin rash or lesion, itching or dry skin  Neurological: no complaints of headache, no confusion, no numbness or tingling, no dizziness or fainting     Objective     /80   Wt 81 6 kg (180 lb)   LMP 12/25/2021   BMI 25 10 kg/m²     General:   appears stated age, cooperative, alert normal mood and affect   Lungs: Unlabored breathing    Breasts: Deferred    Abdomen: soft, non-tender, without masses or organomegaly   Vulva: normal   Lymphatic palpation of lymph nodes in neck, axilla, groin and/or other locations: no lymphadenopathy or masses noted   Skin normal skin turgor and no rashes     Psychiatric orientation to person, place, and time: normal  mood and affect: normal   See dating US

## 2022-03-03 ENCOUNTER — INITIAL PRENATAL (OUTPATIENT)
Dept: OBGYN CLINIC | Facility: MEDICAL CENTER | Age: 27
End: 2022-03-03

## 2022-03-03 VITALS
HEIGHT: 72 IN | SYSTOLIC BLOOD PRESSURE: 106 MMHG | DIASTOLIC BLOOD PRESSURE: 74 MMHG | WEIGHT: 174 LBS | BODY MASS INDEX: 23.57 KG/M2

## 2022-03-03 DIAGNOSIS — Z34.01 ENCOUNTER FOR SUPERVISION OF NORMAL FIRST PREGNANCY IN FIRST TRIMESTER: Primary | ICD-10-CM

## 2022-03-03 PROCEDURE — OBC: Performed by: OBSTETRICS & GYNECOLOGY

## 2022-03-03 NOTE — PROGRESS NOTES
OB History    Para Term  AB Living   1 0 0 0 0 0   SAB IAB Ectopic Multiple Live Births   0 0 0 0 0      # Outcome Date GA Lbr [place]/2nd Weight Sex Delivery Anes PTL Lv   1 Current                  * Pt presents for OB intake  *  *Pt's LMP was Patient's last menstrual period was 2021  *Ultrasound date:2022   7weeks 2days  *Estimated date of delivery: 10/08/2022   * confirmed by US    *Signs/Symptoms of Pregnancy   *no Constipation    *no Headaches   *no Cramping  *no Spotting    *no PICA cravings    Diabetes     *no Hx of GDM    *no BMI >35    *no First degree relative with type 2 diabetes     Hypertension-    *no Hx of chronic HTN     *Infection Screening   *no Does the pt have a hx of MRSA? *no History of herpes? *no History of COVID? *    *Immunizations:   *YES Discussed influenza vaccine     decline   *yes Discussed TDaP vaccine   *yes COVID Vaccine decline    ACTIVE MEDICAL/MENTAL HEALTH CONDITIONS  Autoimmune Disease *n  Asthma: *n   Cardiac Disease *n  Chronic HTN, Pregestational  *n  Hepatitis  *n     treated: *n  Thalassemia Alpha*n  Beta *n  Renal Disease *n  Sickle Cell Ds    Trait *n Disease *n  Depression *n   Anxiety*n  Medications*n      *Interview education   *Handouts given:    *[facility] support center     *MyChart sign up instructions    *Lab Locations    *[facility] List/Choosing Pediatrician Sheet    *[facility] Childbirth [facility] Parent[facility] Classes    *Schedule for Prenatal Visits    *Pregnancy Warning Signs Reviewed    *Safe Medications During Pregnancy    *SMA and CF Testing information sheet    *CPT Code Sheet/MFM 13week NT pamphlet    *[employer]        SBIRT Screen:  Depression Screening Follow-up Plan: Patient's depression screening was negative with an [place] score of  3        *[facility]   *yes discussed genetic testing- pt interested    [place] is aware to call M to schedule us and genetic testing          Patient has been informed of basic prenatal advice such as avoiding alcohol, drugs, and smoking  She should remain hydrated and take daily prenatal vitamins  Patient should avoid caffeine, raw sprouts, high mercury fish, undercooked fish, raw eggs, organ meat, unwashed produce, and unpasteurized cheeses, milk, and fruit juice and undercooked meats  She has been informed about toxoplasmosis and to avoid cat feces  *Details that I feel the provider should be aware of:  Vance Del Toro was seen for her ob intake at [address]  No complaints at this visit  MFM referral placed  Prenatal panel ordered    PN1 visit scheduled for *3/28/2022  The patient was oriented to our practice and all questions were answered      Interviewed by:  Татьяна Victoria

## 2022-03-03 NOTE — PATIENT INSTRUCTIONS
Pregnancy at 7 to 10 Weeks   AMBULATORY CARE:   Changes happening with your body:  Pregnancy hormones may cause your body to go through many changes during this stage of your pregnancy  You may feel more tired than usual, and have mood swings, nausea and vomiting, and headaches  You may gain or lose some weight  Your breasts may feel tender and swollen and you may urinate more often  You may have cravings for certain foods or dislike of foods you normally eat  You may also have heartburn or constipation  Seek care immediately if:   · You have pain or cramping in your abdomen or low back  · You have heavy vaginal bleeding or clotting  · You pass material that looks like tissue or large clots  Collect the material and bring it with you  Call your doctor or obstetrician if:   · You have light bleeding  · You have chills or a fever  · You have vaginal itching, burning, or pain  · You have yellow, green, white, or foul-smelling vaginal discharge  · You have pain or burning when you urinate, less urine than usual, or pink or bloody urine  · You have questions or concerns about your condition or care  How to care for yourself at this stage of your pregnancy:       · Manage nausea and vomiting  Avoid fatty and spicy foods  Eat small meals throughout the day instead of large meals  Noemi may help to decrease nausea  Ask your healthcare provider about other ways of decreasing nausea and vomiting  · Eat a variety of healthy foods  Healthy foods include fruits, vegetables, whole-grain breads, low-fat dairy foods, beans, lean meats, and fish  Drink liquids as directed  Ask how much liquid to drink each day and which liquids are best for you  Limit caffeine to less than 200 milligrams each day  Limit your intake of fish to 2 servings each week  Choose fish low in mercury such as canned light tuna, shrimp, salmon, cod, or tilapia   Do not  eat fish high in mercury such as swordfish, tilefish, katrin mackerel, and shark  · Take prenatal vitamins as directed  Your need for certain vitamins and minerals, such as folic acid, increases during pregnancy  Prenatal vitamins provide some of the extra vitamins and minerals you need  Prenatal vitamins may also help to decrease the risk of certain birth defects  · Ask how much weight you should gain each month  Too much or too little weight gain can be unhealthy for you and your baby  · Talk to your healthcare provider about exercise  Moderate exercise can help you stay fit  Your healthcare provider will help you plan an exercise program that is safe for you during pregnancy  · Do not smoke  Smoking increases your risk of a miscarriage and other health problems during your pregnancy  Smoking can cause your baby to be born too early or weigh less at birth  Quit smoking as soon as you think you might be pregnant  Ask your healthcare provider for information if you need help quitting  · Do not drink alcohol  Alcohol passes from your body to your baby through the placenta  It can affect your baby's brain development and cause fetal alcohol syndrome (FAS)  FAS is a group of conditions that causes mental, behavior, and growth problems  · Talk to your healthcare provider before you take any medicines  Many medicines may harm your baby if you take them when you are pregnant  Do not take any medicines, vitamins, herbs, or supplements without first talking to your healthcare provider  Never use illegal or street drugs (such as marijuana or cocaine) while you are pregnant  Safety tips during pregnancy:   · Avoid hot tubs and saunas  Do not use a hot tub or sauna while you are pregnant, especially during your first trimester  Hot tubs and saunas may raise your baby's temperature and increase the risk of birth defects  · Avoid toxoplasmosis  This is an infection caused by eating raw meat or being around infected cat feces   It can cause birth defects, miscarriages, and other problems  Wash your hands after you touch raw meat  Make sure any meat is well-cooked before you eat it  Avoid raw eggs and unpasteurized milk  Use gloves or ask someone else to clean your cat's litter box while you are pregnant  Changes happening with your baby:  By 10 weeks, your baby will be about 2½ inches long from the top of the head to the rump (baby's bottom)  Your baby weighs about ½ ounce  Major body organs, such as the brain, heart, and lungs, are forming  Your baby's facial features are also starting to form  Prenatal care:  Prenatal care is a series of visits with your healthcare provider throughout your pregnancy  During the first 28 weeks of your pregnancy, you will see your healthcare provider 1 time each month  Prenatal care can help prevent problems during pregnancy and childbirth  Your healthcare provider will check your blood pressure and weight  Your baby's heart rate will also be checked  You may also need the following at some visits:  · A pelvic exam  allows your healthcare provider to see your cervix (the bottom part of your uterus)  Your healthcare provider will use a speculum to open your vagina  He or she will check the size and shape of your uterus  You may also have a Pap smear at your first prenatal visit  This is a test to check your cervix for abnormal cells  · Blood tests  may be done to check for any of the following:     ? Gestational diabetes or anemia (low iron level)    ? Blood type or Rh factor, or certain birth defects    ? Immunity to certain diseases, such as chickenpox or rubella    ? An infection, such as a sexually transmitted infection, HIV, or hepatitis B    · Hepatitis B  may need to be prevented or treated  Hepatitis B is inflammation of the liver caused by the hepatitis B virus (HBV)  HBV can spread from a mother to her baby during delivery   You will be checked for HBV as early as possible in the first trimester of each pregnancy  You need the test even if you received the hepatitis B vaccine or were tested before  You may need to have an HBV infection treated before you give birth  · Urine tests  may also be done to check for sugar and protein  These can be signs of gestational diabetes or preeclampsia  Urine tests may also be done to check for signs of infection  · A fetal ultrasound  shows pictures of your baby inside your uterus  The pictures are used to check your baby's development, movement, and position  · Genetic disorder screening tests  may be offered to you  These screening tests check your baby's risk for genetic disorders such as Down syndrome  A screening test includes a blood test and ultrasound  Follow up with your doctor or obstetrician as directed:  Go to all prenatal visits  Write down your questions so you remember to ask them during your visits  © Copyright [employer] 2022 Information is for End User's use only and may not be sold, redistributed or otherwise used for commercial purposes  All illustrations and images included in CareNotes® are the copyrighted property of A [employer] A M [employer] Inc  or [facility] [facility] [place] [facility]  The above information is an  only  It is not intended as medical advice for individual conditions or treatments  Talk to your doctor, nurse or pharmacist before following any medical regimen to see if it is safe and effective for you

## 2022-03-07 ENCOUNTER — TELEMEDICINE (OUTPATIENT)
Dept: PERINATAL CARE | Facility: CLINIC | Age: 27
End: 2022-03-07

## 2022-03-07 DIAGNOSIS — Z36.0 ENCOUNTER FOR ANTENATAL SCREENING FOR CHROMOSOMAL ANOMALIES: Primary | ICD-10-CM

## 2022-03-07 DIAGNOSIS — Z31.5 ENCOUNTER FOR PROCREATIVE GENETIC COUNSELING: ICD-10-CM

## 2022-03-07 PROCEDURE — NC001 PR NO CHARGE

## 2022-03-08 LAB
EXTERNAL ANTIBODY SCREEN: NORMAL
EXTERNAL HEMATOCRIT: 36.8 %
EXTERNAL HEMOGLOBIN: 12.2 G/DL
EXTERNAL HEPATITIS B SURFACE ANTIGEN: NORMAL
EXTERNAL HIV-1/2 AB-AG: NORMAL
EXTERNAL PLATELET COUNT: 233 K/ΜL
EXTERNAL RH FACTOR: POSITIVE
EXTERNAL RUBELLA IGG QUANTITATION: NORMAL
EXTERNAL SYPHILIS RPR SCREEN: NORMAL

## 2022-03-09 LAB
ABO GROUP BLD: NORMAL
BASOPHILS # BLD AUTO: 32 CELLS/UL (ref 0–200)
BASOPHILS NFR BLD AUTO: 0.3 %
BLD GP AB SCN SERPL QL: NORMAL
EOSINOPHIL # BLD AUTO: 64 CELLS/UL (ref 15–500)
EOSINOPHIL NFR BLD AUTO: 0.6 %
ERYTHROCYTE [DISTWIDTH] IN BLOOD BY AUTOMATED COUNT: 12.5 % (ref 11–15)
HBV SURFACE AG SERPL QL IA: NORMAL
HCT VFR BLD AUTO: 36.8 % (ref 35–45)
HGB BLD-MCNC: 12.2 G/DL (ref 11.7–15.5)
HIV 1+2 AB+HIV1 P24 AG SERPL QL IA: NORMAL
LYMPHOCYTES # BLD AUTO: 3285 CELLS/UL (ref 850–3900)
LYMPHOCYTES NFR BLD AUTO: 30.7 %
MCH RBC QN AUTO: 29.9 PG (ref 27–33)
MCHC RBC AUTO-ENTMCNC: 33.2 G/DL (ref 32–36)
MCV RBC AUTO: 90.2 FL (ref 80–100)
MONOCYTES # BLD AUTO: 728 CELLS/UL (ref 200–950)
MONOCYTES NFR BLD AUTO: 6.8 %
NEUTROPHILS # BLD AUTO: 6591 CELLS/UL (ref 1500–7800)
NEUTROPHILS NFR BLD AUTO: 61.6 %
PLATELET # BLD AUTO: 233 THOUSAND/UL (ref 140–400)
PMV BLD REES-ECKER: 10.9 FL (ref 7.5–12.5)
RBC # BLD AUTO: 4.08 MILLION/UL (ref 3.8–5.1)
RH BLD: NORMAL
RPR SER QL: NORMAL
RUBV IGG SERPL IA-ACNC: 4.88 INDEX
WBC # BLD AUTO: 10.7 THOUSAND/UL (ref 3.8–10.8)

## 2022-03-24 NOTE — PATIENT INSTRUCTIONS
Thank you for visiting [facility]  You may be invited to complete a survey about you visit  Your responses will help us improve care we provide  A 10 means the care you received at your visit met your expectations  If you are unable to give a 10 please list reasons so we can work on improving your patient experience  Medications and Pregnancy  The following list of over-the-counter medications is usually considered safe to take during pregnancy  Take care to not double up on products containing acetaminophen (Tylenol)  Colds/Sore Throat   Robitussin DM - Plain (guaifenesin)   Saline nasal spray   Warm salt water gargle   Cepacol throat lozenges or mouthwash (cetylpyridinium)   Sucrets (hexylresoricinol)  Allergy   AVOID the D - or DECONGESTANT   Claritin (loratadine)   Zrytec (cetirizine)   Allerga (fexofenadine)  Headaches / Aches and Pains   Tylenol (acetaminophen)  Do NOT exceed more than 3000 mg of Tylenol in a 24-hour period  Heartburn   Mylanta (aluminum hydroxide/simethicone, magnesium hydroxide)   Maalaox (aluminum magnesium hydroxide, magnesium hydroxide)   Tums (calcium carbonate)   Riopan (magaldrate)  Constipation   Colace (docusate sodium)   Surfak (docusate sodium)   MiraLAX   Glycerin suppositories   Fleets enema (sodium phosphate & sodium biphosphate)  Nausea/Vomiting   Vitamin B6 (pyridoxine) - May take 50 mg at bedtime, 25 mg in the morning, 25 mg in the afternoon   Unisom (doxylamine) - May use for nausea/vomiting - (cut a 25 mg tablet in half)  May cause drowsiness  Sleep   Benadryl (diphenhydramine) - Take 1-2 tablets as needed at bedtime   Unisom (doxylamine) 25 mg tablet - As needed at bedtime   Melatonin 5 mg tablet - As needed at bedtime    Generally the generic form of medicine is usually lower priced than the brand name form of the medicine  Talk to your healthcare provider before you take any medicines    Many medicines may harm your baby if you take them when you are pregnant  Do not take any medicines, vitamins, herbs, or supplements without first talking to your healthcare provider  First off, a study released 3/25/21 demonstrating positive vaccine-generated antibodies present in umbilical cord blood and breastmilk of vaccinated pregnant women  This is great news  The research group evaluated 131 reproductive-age women (84 pregnant, 31 lactating, and 16 non-pregnant) and evaluated several antibody types, measuring them in blood and breastmilk, at several time points (baseline, second vaccine dose, 2-6 weeks post second vaccine, and at delivery)  They also evaluated the cord blood of 10 babies  They compared these to women who had antibodies after coronavirus infection  Antibody levels were equivalent when pregnant and lactating women were compared to non-pregnant women, suggesting a good immune response in pregnancy and lactation  Antibody levels were higher after vaccination than after infection  Vaccine-generated antibodies were present in all cord blood and breastmilk samples  The link to the abstract can be found here:  [email or web address] [email or web address]    Other resources are here:    1  The V-safe program   [employer]  V-safe is a smartphone-based tool that uses text messaging and web surveys to provide personalized health check-ins after you receive a COVID-19 vaccine  As of 4/5/21, a total of [id number],960 pregnant women have enrolled  2   The v-safe COVID-19 Vaccine Pregnancy Registry  [employer] com au  This registry is for v-safe participants who report vaccination in the periconception period or during pregnancy  The registry activities are in addition to the v-safe after vaccination health check-ins that participants receive via text message    Pregnant participants in the registry will be contacted to answer questions about their pregnancy and medical history  Participants will also be asked for permission to contact their healthcare provider(s)  3   As an FYI, [employer]) is currently running a randomized placebo-controlled trial of pregnant women: https://clinicaltrials gov/ct2/show/JNM36411543        4   This study (COVID-PRICE): https://clinicaltrials gov/ct2/show/PBQ61602681 is being conducted nearby ([facility] in [facility])  It involves collection of maternal blood, cord blood, and breastmilk  We ([facility]) are not a study site, but if you are interested in participating, I can assist with connecting you with study staff  5   This study (C-VIPER) NotebookPreviews it is available though doesn't appear to have yet started recruiting  These are just studies listed through clinicaltrials gov  There are likely other studies available that may not be nationally registered  Lastly, here is a special video from two prominent [facility] all about the vaccine in pregnancy: https://vimeo  [email or web address]          COVID-19 and Pregnancy   AMBULATORY CARE:   What you need to know about coronavirus disease 2019 (COVID-19) and pregnancy:  Pregnancy increases your risk for severe COVID-19 illness  COVID-19 can also lead to  delivery of your baby  Most babies who become infected with the new virus do not develop serious effects, but some do  It is important for you and your baby to stay safe during pregnancy and delivery  Signs and symptoms of COVID-19 in newborns: The following signs and symptoms may be from COVID-19, but they are also common in newborns  Your 's healthcare provider may recommend testing to confirm or rule out COVID-19  Your  may need a second test if the first is negative    · Fever    · Not moving arms or legs much, or being too sleepy to feed    · A runny nose or cough    · Fast breathing, or trouble breathing    · Vomiting, diarrhea, or not feeding well    Call your local emergency number (911 in the [address]) if:   · You have trouble breathing or shortness of breath at rest     · You have chest pain or pressure that lasts longer than 5 minutes  · You become confused or hard to wake  · Your lips or face are blue  Seek care immediately if:   · You have a fever of 104°F (40°C) or higher  Call your doctor if:   · You have symptoms of COVID-19  · You have questions or concerns about your condition or care  How the 2019 coronavirus spreads: The virus spreads quickly and easily  The virus can be passed starting 2 to 3 days before symptoms begin or before a positive test if symptoms never begin  · Droplets are the main way all coronaviruses spread  The virus travels in droplets that form when a person talks, sings, coughs, or sneezes  The droplets can also float in the air for minutes or hours  Infection happens when you breathe in the droplets or get them in your eyes or nose  Close personal contact with an infected person increases your risk for infection  This means being within 6 feet (2 meters) of the person for at least 15 minutes over 24 hours  · Person-to-person contact can spread the virus  For example, a person with the virus on his or her hands can spread it by shaking hands with someone  · The virus can stay on objects and surfaces for up to 3 days  You may become infected by touching the object or surface and then touching your eyes or mouth  Protect yourself and your baby while you are pregnant: If you have COVID-19 during your pregnancy, healthcare providers will monitor you and your baby closely  Work with your healthcare provider or obstetrician  If you do not have either, experts recommend you contact a local community health center or health department   The following measures can help keep you and your baby safe  Continue even after you are vaccinated against COVID-19  These are still the best ways to prevent infection:  · Wash your hands throughout the day  Use soap and water  Rub your soapy hands together, lacing your fingers  Wash the front and back of each hand, and in between your fingers  Use the fingers of one hand to scrub under the fingernails of the other hand  Wash for at least 20 seconds  Rinse with warm, running water for several seconds  Dry your hands with a clean towel or paper towel  Use hand  that contains alcohol if soap and water are not available  · Protect yourself from sneezes and coughs  Turn your face away and cover your mouth and nose if you are around someone who is sneezing or coughing  This helps protect you from the person's droplets  Cover your mouth and nose with a tissue when you need to sneeze or cough  Use the bend of your arm if you do not have a tissue  Throw the tissue away  Then wash your hands or use hand   · Try not to touch your face  If you get the virus on your hands, you can transfer it to your eyes, nose, or mouth and become infected  You can also transfer it to objects, surfaces, or people  · Follow worldwide, national, and local social distancing guidelines  Keep at least 6 feet (2 meters) between you and others  · Wear a face covering (mask) when needed  Use a disposable non-medical mask, or make a cloth covering with at least 2 layers  You can also create layers by putting a cloth covering over a disposable non-medical mask  Cover your mouth and your nose  Continue social distancing and washing your hands often  Do not put a face shield or covering on your   These increase the risk for sudden infant death syndrome (SIDS)  · Clean and disinfect high-touch surfaces and objects often  Use disinfecting wipes, or make a solution of 4 teaspoons of bleach in 1 quart (4 cups) of water      · Stay home if you are sick or think you may have COVID-19  It is important to stay home if you are waiting for a testing appointment or for test results  · Avoid or limit close physical contact with anyone who does not live in your home  Do not shake hands with, hug, or kiss a person as a greeting  If you must use public transportation (such as a bus or subway), try to sit or stand away from others  Wear your face covering  · Avoid in-person gatherings and crowds  Attend virtually if possible  What you can do to have a healthy pregnancy:   · Keep all prenatal and  appointments  You may be able to have certain prenatal appointments without having to go into the provider's office  Some providers offer phone, video, or other types of appointments  You may also be able to get prescriptions for a few months at a time  This will help lower the number of trips you need to make to the pharmacy for refills  If you do need to go into your provider's office, take precautions  Put a face covering on before you go into the office  Do not stand or sit within 6 feet (2 meters) of anyone in the waiting room, if possible  Do not stand or sit near anyone who is not wearing a face covering  · Get recommended vaccines  Your healthcare provider can tell you if you need vaccines not listed below, and when to get them  ? COVID-19 vaccines are given as a shot in 1 or 2 doses  Vaccination is recommended for everyone 5 years or older  One 2-dose vaccine is fully approved for those 12 or older  This vaccine also has an emergency use authorization (EUA) for children 11to 13years old  No vaccine is currently available for children younger than 5 years  A booster (additional) dose is given to help the immune system continue to protect against severe COVID-19  § A booster is recommended for all adults 18 or older  The booster can be a different brand of the COVID-19 vaccine than you originally received   The timing for the booster depends on the type of vaccine you received:    § 1-dose vaccine: The booster is given at least 2 months after you received the vaccine  § 2-dose vaccine: The booster is given at least 6 months after the second dose   § A booster can be given to adolescents 12to 16years old  Only 1 COVID-19 vaccine has an EUA for adolescent boosters  The booster is given at least 6 months after the second dose of the original vaccine series  · Get the influenza (flu) vaccine  Try to get the vaccine as soon as recommended each year, usually starting in September or October  · Get the Tdap vaccine  The Tdap vaccine protects you from tetanus, diphtheria, and pertussis  If possible, get the vaccine during weeks 27 to 36 of your pregnancy  You should get a dose of Tdap with each pregnancy  Take prenatal vitamins as directed  Your prenatal vitamins should contain folic acid  You need about 600 micrograms (mcg) of folic acid each day during pregnancy  Folic acid helps to form your baby's brain and spinal cord in early pregnancy  Eat a variety of healthy foods  Healthy foods are important, even if you take a prenatal vitamin  Healthy foods contain nutrients that help keep your immune system strong  Examples of healthy foods include vegetables, fruits, whole-grain breads and cereals, lean meats and poultry, fish, low-fat dairy products, and cooked beans  Do not have raw, undercooked, or unpasteurized food or drinks  Unpasteurized foods are foods that have not gone through the heating process (pasteurization) that destroys bacteria  Your healthcare provider or a dietitian can help you create healthy meal plans  Talk to your healthcare provider about exercise  Moderate exercise can help keep your immune system strong  Your healthcare provider can help you plan an exercise program that is safe for you during pregnancy   You may need to exercise at home if you cannot exercise outdoors, such as walking in a park  If you want to do pregnancy yoga or other group activities, be safe  Stay at least 6 feet (2 meters) away from others in the class, and the instructor  Wash your hands before you leave the building  Follow the facility's instructions for preventing infections  Try to lower your stress  You may be feeling more stressed than usual because of the COVID-19 outbreak  You may also feel stress from not being able to share your pregnancy with others  For example, you may not be able to have someone with you during prenatal visits or ultrasounds  Talk to your healthcare providers about ways to manage stress during this time  Pick 1 or 2 times a day to watch the news  Constant news watching about COVID-19 can increase your stress levels  Set a sleep schedule to go to bed and wake up at the same times each day  Do not smoke cigarettes, drink alcohol, or use drugs  Nicotine and other chemicals in cigarettes and cigars can harm your baby and your health  Alcohol can increase your risk for a miscarriage  Your baby may also be born too small or have other health problems  Certain drugs can be passed to your baby before he or she is born  Some can be passed through breast milk  It is best to quit cigarettes, alcohol, and drugs before you become pregnant and not start again after your baby is born  Ask your healthcare provider for information if you currently use any of these and need help to quit  Protect your  during delivery and while you are in the hospital:  It is not known for sure if an unborn baby can be infected with the virus that causes COVID-19  Some newborns have tested positive for the virus  The newborns may have been infected before, during, or after birth  The greatest risk is for a  to be in close contact with an infected person  Your baby may be tested for the virus soon after being born if you have COVID-23   He or she may be tested again before you leave the hospital  This depends on whether your baby has any signs or symptoms of COVID-19  You will be able to make choices for you and your baby during your hospital stay  Talk to healthcare providers about the following:  · Ask about temporary separation if you have COVID-19  Temporary separation means your  is moved to a different room from you  You will be able to make the decision if you want to do this  Separation will help lower your 's risk for being infected  You will still be able to give your  breast milk  You may need to pump the milk  Someone who does not have COVID-19 will then feed the pumped milk to your   You may instead choose to have your baby brought to you when you want to breastfeed  Wash your hands and the skin around your nipples before you hold your baby  Wear a face covering while you breastfeed  · Be careful if you have COVID-19 and do not choose temporary separation  Healthcare providers will keep your  at least 6 feet (2 meters) away from you as much as possible  Your  may be placed in an incubator  The incubator will help protect your  from infection  Always wash your hands and put on a face covering when you hold, touch, or have close contact with your   · Ask about visitors  The facility may not be allowing any visitors to newborns during this time  If you are allowed visitors, you may need to limit how many you can have at a time  Do not allow anyone who has known or suspected COVID-19 to visit  Even without signs or symptoms, the person can infect your  or others in the room  All visitors need to wash their hands and put on clean face coverings before entering your room  The face covering needs to stay on during the whole visit  Do not let anyone take the face covering down to make faces at your baby, talk, sneeze, or cough  Do not let anyone kiss you or your baby      Protect your  at home:   · You can choose to continue temporary separation if you have COVID-19  You can do this if an adult who does not have COVID-19 can care for your   Your healthcare provider can give you instructions on how to do this safely at home  Only have close contact with your  when needed  Remember to wash your hands and put on a clean face covering first  You may need to continue pumping your breast milk  A healthy adult can feed the pumped breast milk to your   You may instead choose to have your baby brought to you when you want to breastfeed  Take precautions to keep your baby safe  Wash your hands and the skin around your nipples before you hold your baby  You will also need to wear a face covering while you breastfeed  · Use face coverings safely  Everyone who has COVID-19 needs to wear a clean face covering while being within 6 feet (2 meters) of your   This includes other children in your home who are 2 years or older  Do not put a face covering or plastic face shield on your   Any covering increases your 's risk for sudden infant death syndrome (SIDS)  Do not use coverings on children younger than 2 years or on anyone who has breathing problems or cannot remove it  · Be careful about visitors  Continue precautions you used in the hospital  Do not allow anyone who has known or suspected COVID-19 to come over to see your   Have visitors put on clean face coverings before they enter your home  Have them wash their hands as soon as they come in  The face covering needs to stay on during the whole visit  · Keep all checkup appointments  You may be able to have some appointments by phone or video meeting  Other appointments will need to be in person, such as for vaccines  Vaccines are normally given to babies at certain ages  Until COVID-19 is under control, your 's provider will give you a vaccine schedule  It is important for your  to get all recommended vaccines         What you need to know about breastfeeding:  Breastfeeding for the first 6 months decreases your baby's risk for respiratory (lung) infections, allergies, asthma, and stomach problems  Breast milk also helps your baby develop a strong immune system  Breast milk is considered safe, even if you have COVID-19  Experts currently believe the virus that causes COVID-19 does not spread in breast milk  Do the following to help protect your baby:  · Wash your hands before every breastfeeding or pumping session  Even if you do not have COVID-19, you can transfer the virus from your hands to your baby or the pump  Use soap and water to wash your hands whenever possible  Use hand  that contains alcohol if soap and water are not available  · Clean and sanitize your breast pump after each use  Follow the 's directions for cleaning and sanitizing the pump  It is important not to use it until it is clean and sanitized  · If you have COVID-19:      ? Wear a face covering while you breastfeed or pump  This will help prevent you from passing the virus through droplets when you talk, cough, sneeze, or sing  The virus can stay on surfaces such as a breast pump for hours to days  ? Have someone who is not infected bottle feed your baby, if possible  Have the person wash his or her hands with soap and water before each feeding  The person can feed your  pumped breast milk or formula  Follow up with your doctor or obstetrician as directed:  Write down your questions so you remember to ask them during your visits  For more information:   · Centers for Disease Control and Prevention  [address] , [address]  Phone: 7- 801 - 879-3820  Web Address: Detective[employer] com br    © Copyright [employer]  Information is for End User's use only and may not be sold, redistributed or otherwise used for commercial purposes   All illustrations and images included in CareNotes® are the copyrighted property of A D A M , OM Latam  or [address] [facility]  The above information is an  only  It is not intended as medical advice for individual conditions or treatments  Talk to your doctor, nurse or pharmacist before following any medical regimen to see if it is safe and effective for you  Pregnancy at 15 to 18 Weeks   [address]:   What changes are happening in my body? Now that you are in your second trimester, you have more energy  You may also feel hungrier than usual  You may start to experience other symptoms, such as heartburn or dizziness  You may be gaining about ½ to 1 pound a week, and your pregnancy is beginning to show  You may need to start wearing maternity clothes  How do I care for myself at this stage of my pregnancy? · Manage heartburn  by eating 4 or 5 small meals each day instead of large meals  Avoid spicy foods  Avoid eating right before bedtime  · Manage nausea and vomiting  Avoid fatty and spicy foods  Eat small meals throughout the day instead of large meals  Noemi may help to decrease nausea  Ask your healthcare provider about other ways of decreasing nausea and vomiting  · Eat a variety of healthy foods  Healthy foods include fruits, vegetables, whole-grain breads, low-fat dairy foods, beans, lean meats, and fish  Drink liquids as directed  Ask how much liquid to drink each day and which liquids are best for you  Limit caffeine to less than 200 milligrams each day  Limit your intake of fish to 2 servings each week  Choose fish low in mercury such as canned light tuna, shrimp, salmon, cod, or tilapia  Do not  eat fish high in mercury such as swordfish, tilefish, katrin mackerel, and shark  · Take prenatal vitamins as directed  Your need for certain vitamins and minerals, such as folic acid, increases during pregnancy  Prenatal vitamins provide some of the extra vitamins and minerals you need   Prenatal vitamins may also help to decrease the risk of certain birth defects  · Do not smoke  Smoking increases your risk of a miscarriage and other health problems during your pregnancy  Smoking can cause your baby to be born too early or weigh less at birth  Ask your healthcare provider for information if you need help quitting  · Do not drink alcohol  Alcohol passes from your body to your baby through the placenta  It can affect your baby's brain development and cause fetal alcohol syndrome (FAS)  FAS is a group of conditions that causes mental, behavior, and growth problems  · Talk to your healthcare provider before you take any medicines  Many medicines may harm your baby if you take them when you are pregnant  Do not take any medicines, vitamins, herbs, or supplements without first talking to your healthcare provider  Never use illegal or street drugs (such as marijuana or cocaine) while you are pregnant  What are some safety tips during pregnancy? · Avoid hot tubs and saunas  Do not use a hot tub or sauna while you are pregnant, especially during your first trimester  Hot tubs and saunas may raise your baby's temperature and increase the risk of birth defects  · Avoid toxoplasmosis  This is an infection caused by eating raw meat or being around infected cat feces  It can cause birth defects, miscarriages, and other problems  Wash your hands after you touch raw meat  Make sure any meat is well-cooked before you eat it  Avoid raw eggs and unpasteurized milk  Use gloves or ask someone else to clean your cat's litter box while you are pregnant  What changes are happening with my baby? By 18 weeks, your baby may be about 6 inches long from the top of the head to the rump (baby's bottom)  Your baby may weigh about 11 ounces  You may be able to feel your baby's movement at about 18 weeks or later  The first movements may not be that noticeable  They may feel like a fluttering sensation   Your baby also makes sucking movements and can hear certain sounds  What do I need to know about prenatal care? During the first 28 weeks of your pregnancy, you will see your healthcare provider once a month  Your healthcare provider will check your blood pressure and weight  You may also need any of the following:  · A urine test  may also be done to check for sugar and protein  These can be signs of gestational diabetes or infection  · A blood test  may be done to check for anemia (low iron level)  · Fundal height check  is a measurement of your uterus to check your baby's growth  This number is usually the same as the number of weeks that you have been pregnant  · An ultrasound  may be done to check your baby's development  Your healthcare provider may be able to tell you what your baby's gender is during the ultrasound  · Your baby's heart rate  will be checked  When should I seek immediate care? · You have pain or cramping in your abdomen or low back  · You have heavy vaginal bleeding or clotting  · You pass material that looks like tissue or large clots  Collect the material and bring it with you  When should I call my doctor or obstetrician? · You cannot keep food or drinks down, and you are losing weight  · You have light bleeding  · You have chills or a fever  · You have vaginal itching, burning, or pain  · You have yellow, green, white, or foul-smelling vaginal discharge  · You have pain or burning when you urinate, less urine than usual, or pink or bloody urine  · You have questions or concerns about your condition or care  CARE AGREEMENT:   You have the right to help plan your care  Learn about your health condition and how it may be treated  Discuss treatment options with your healthcare providers to decide what care you want to receive  You always have the right to refuse treatment  The above information is an  only   It is not intended as medical advice for individual conditions or treatments  Talk to your doctor, nurse or pharmacist before following any medical regimen to see if it is safe and effective for you  © Copyright [employer] 2022 Information is for End User's use only and may not be sold, redistributed or otherwise used for commercial purposes  All illustrations and images included in CareNotes® are the copyrighted property of [employer]  or [address]  Pregnancy at [address] to [address]:   What changes are happening in my body? You are now at the end of your first trimester and entering your second trimester  Morning sickness usually goes away by this time  You may have other symptoms such as fatigue, frequent urination, and headaches  You may have gained 2 to 4 pounds by now  How do I care for myself at this stage of my pregnancy? · Get plenty of rest   You may feel more tired than normal  You may need to take naps or go to bed earlier  · Manage nausea and vomiting  Avoid fatty and spicy foods  Eat small meals throughout the day instead of large meals  Noemi may help to decrease nausea  Ask your healthcare provider about other ways of decreasing nausea and vomiting  · Eat a variety of healthy foods  Healthy foods include fruits, vegetables, whole-grain breads, low-fat dairy foods, beans, lean meats, and fish  Drink liquids as directed  Ask how much liquid to drink each day and which liquids are best for you  Limit caffeine to less than 200 milligrams each day  Limit your intake of fish to 2 servings each week  Choose fish low in mercury such as canned light tuna, shrimp, salmon, cod, or tilapia  Do not  eat fish high in mercury such as swordfish, tilefish, katrin mackerel, and shark  · Take prenatal vitamins as directed  Your need for certain vitamins and minerals, such as folic acid, increases during pregnancy  Prenatal vitamins provide some of the extra vitamins and minerals you need   Prenatal vitamins may also help to decrease the risk of certain birth defects  · Do not smoke  Smoking increases your risk of a miscarriage and other health problems during your pregnancy  Smoking can cause your baby to be born too early or weigh less at birth  Ask your healthcare provider for information if you need help quitting  · Do not drink alcohol  Alcohol passes from your body to your baby through the placenta  It can affect your baby's brain development and cause fetal alcohol syndrome (FAS)  FAS is a group of conditions that causes mental, behavior, and growth problems  · Talk to your healthcare provider before you take any medicines  Many medicines may harm your baby if you take them when you are pregnant  Do not take any medicines, vitamins, herbs, or supplements without first talking to your healthcare provider  Never use illegal or street drugs (such as marijuana or cocaine) while you are pregnant  What are some safety tips during pregnancy? · Avoid hot tubs and saunas  Do not use a hot tub or sauna while you are pregnant, especially during your first trimester  Hot tubs and saunas may raise your baby's temperature and increase the risk of birth defects  · Avoid toxoplasmosis  This is an infection caused by eating raw meat or being around infected cat feces  It can cause birth defects, miscarriages, and other problems  Wash your hands after you touch raw meat  Make sure any meat is well-cooked before you eat it  Avoid raw eggs and unpasteurized milk  Use gloves or ask someone else to clean your cat's litter box while you are pregnant  What changes are happening with my baby? Your baby has fully formed fingernails and toenails  Your baby's heartbeat can now be heard  Ask your healthcare provider if you can listen to your baby's heartbeat  By week 14, your baby is over 4 inches long from the top of the head to the rump (baby's bottom)  Your baby weighs over 3 ounces    What do I need to know about prenatal care? Prenatal care is a series of visits with your healthcare provider throughout your pregnancy  During the first 28 weeks of your pregnancy, you will see your healthcare provider 1 time each month  Prenatal care can help prevent problems during pregnancy and childbirth  Your healthcare provider will check your blood pressure and weight  Your baby's heart rate will also be checked  You may also need the following at some visits:  · A pelvic exam  allows your healthcare provider to see your cervix (the bottom part of your uterus)  Your healthcare provider will use a speculum to open your vagina  He or she will check the size and shape of your uterus  · Blood tests  may be done to check for any of the following:    ? Gestational diabetes or anemia (low iron level)    ? Blood type or Rh factor, or certain birth defects    ? Immunity to certain diseases, such as chickenpox or rubella    ? An infection, such as a sexually transmitted infection, HIV, or hepatitis B    · Hepatitis B  may need to be prevented or treated  Hepatitis B is inflammation of the liver caused by the hepatitis B virus (HBV)  HBV can spread from a mother to her baby during delivery  You will be checked for HBV as early as possible in the first trimester of each pregnancy  You need the test even if you received the hepatitis B vaccine or were tested before  You may need to have an HBV infection treated before you give birth  · Urine tests  may also be done to check for sugar and protein  These can be signs of gestational diabetes or preeclampsia  Urine tests may also be done to check for signs of infection  · A fetal ultrasound  shows pictures of your baby inside your uterus  The pictures are used to check your baby's development, movement, and position  · Genetic disorder screening tests  may be offered to you  These tests check your baby's risk for genetic disorders such as Down syndrome   A screening test includes a blood test and ultrasound  When should I seek immediate care? · You have pain or cramping in your abdomen or low back  · You have heavy vaginal bleeding or clotting  · You pass material that looks like tissue or large clots  Collect the material and bring it with you  When should I call my doctor or obstetrician? · You cannot keep food or drinks down, and you are losing weight  · You have light bleeding  · You have chills or a fever  · You have vaginal itching, burning, or pain  · You have yellow, green, white, or foul-smelling vaginal discharge  · You have pain or burning when you urinate, less urine than usual, or pink or bloody urine  · You have questions or concerns about your condition or care  CARE AGREEMENT:   You have the right to help plan your care  Learn about your health condition and how it may be treated  Discuss treatment options with your healthcare providers to decide what care you want to receive  You always have the right to refuse treatment  The above information is an  only  It is not intended as medical advice for individual conditions or treatments  Talk to your doctor, nurse or pharmacist before following any medical regimen to see if it is safe and effective for you  © Copyright [employer] 2022 Information is for End User's use only and may not be sold, redistributed or otherwise used for commercial purposes   All illustrations and images included in CareNotes® are the copyrighted property of A D A M , Inc  or [address] [place] [address] [employer]

## 2022-03-24 NOTE — PROGRESS NOTES
Prenatal H&P     Denies loss of fluid, vaginal discharge, vaginal bleeding  and abdominal pain  Not feeling fetal movement  Tolerating PNV, gummy well  Prenatal panel reviewed WNL  Plans for genetic screening, met with genetic counselor  Welcomed pregnancy  OB history:     G1- current     Dating:     LMP - 12/25/21 [facility] 10/1/22     US on 2/21/22 @  7w 2d [facility] 10/8/22  Working [facility] 10/8/22    Surgical history:     Dental surgery     Medical History:     Migraine and ganglion cyst     Social history:     Alcohol/ tobacco/ illicit drug -no alcohol use since pregnancy/denies tobacco and drug use     Denies history of STD/STI     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,      PCP/ Dental- last PE fall 2021/ last dental cleaning 1/2022     SBIRT screen negative at intake    She denies a hx of recent cough, chills, fever,  STD/STI, denies a personal history  of TB, COVID-19 and Zika virus or close contacts with persons with TB or COVID -19  She denies a family history of inheritable conditions such as physical or intellectual disabilities, birth defects, blood disorders, heart or neural tube defects  She has never had MRSA  She denies recent travel  Has travel plans [facility] in June      Patient Plans   - Feeding breastfeeding  - Contraception uncertain  - [facility] office delivers [facility]  If < 32 weeks will recommend evaluation at [address] [place] [address] St:  - Continue prenatal vitamin daily  - Genetic screening planning NIPT testing  - [facility] 4/6/22   -  Weight gain in pregnancy- Who BMI recommend 25-35 lb   Healthy diet and daily exercise reviewed and encouraged  - Unit regimen reviewed visit every 4 weeks until 28 weeks, every 2 weeks until 36 weeks and then weekly until delivery      -  Gonorrhea/ chlamydia collected   - Vaccines in Pregnancy      - TDAP vaccine after 27 gestational weeks     - Reviewed with patient  Pregnancy with COVID infection is considered  high risk and can have poor outcome including  delivery, more severe infection  therefore  pregnant patients are recommended to get  COVID-19 vaccination  Written information provided  Declines after counseling     - influenza October- March  Declines after counseling  -  Common discomforts of pregnancy and precautions reviewed  Signs and symptoms to report reviewed  Encouraged social distancing, good hand hygiene, masking, avoiding crowds and written information provided about COVID-19    RTO 4 weeks f/u GC

## 2022-03-28 ENCOUNTER — INITIAL PRENATAL (OUTPATIENT)
Dept: OBGYN CLINIC | Facility: MEDICAL CENTER | Age: 27
End: 2022-03-28

## 2022-03-28 VITALS
HEIGHT: 72 IN | SYSTOLIC BLOOD PRESSURE: 102 MMHG | WEIGHT: 175 LBS | BODY MASS INDEX: 23.7 KG/M2 | DIASTOLIC BLOOD PRESSURE: 70 MMHG

## 2022-03-28 DIAGNOSIS — Z11.3 ROUTINE SCREENING FOR STI (SEXUALLY TRANSMITTED INFECTION): ICD-10-CM

## 2022-03-28 DIAGNOSIS — Z34.91 PRENATAL CARE IN FIRST TRIMESTER: Primary | ICD-10-CM

## 2022-03-28 DIAGNOSIS — Z28.21 COVID-19 VACCINATION DECLINED: ICD-10-CM

## 2022-03-28 DIAGNOSIS — Z3A.12 12 WEEKS GESTATION OF PREGNANCY: ICD-10-CM

## 2022-03-28 PROCEDURE — 3008F BODY MASS INDEX DOCD: CPT | Performed by: OBSTETRICS & GYNECOLOGY

## 2022-03-28 PROCEDURE — PNV: Performed by: NURSE PRACTITIONER

## 2022-03-29 LAB
C TRACH RRNA SPEC QL NAA+PROBE: NOT DETECTED
N GONORRHOEA RRNA SPEC QL NAA+PROBE: NOT DETECTED

## 2022-04-06 ENCOUNTER — ROUTINE PRENATAL (OUTPATIENT)
Dept: PERINATAL CARE | Facility: OTHER | Age: 27
End: 2022-04-06
Payer: COMMERCIAL

## 2022-04-06 VITALS
WEIGHT: 173.6 LBS | HEART RATE: 105 BPM | DIASTOLIC BLOOD PRESSURE: 81 MMHG | SYSTOLIC BLOOD PRESSURE: 113 MMHG | BODY MASS INDEX: 23.51 KG/M2 | HEIGHT: 72 IN

## 2022-04-06 DIAGNOSIS — O21.9 NAUSEA/VOMITING IN PREGNANCY: ICD-10-CM

## 2022-04-06 DIAGNOSIS — Z34.01 ENCOUNTER FOR SUPERVISION OF NORMAL FIRST PREGNANCY IN FIRST TRIMESTER: ICD-10-CM

## 2022-04-06 DIAGNOSIS — Z3A.13 13 WEEKS GESTATION OF PREGNANCY: ICD-10-CM

## 2022-04-06 DIAGNOSIS — Z36.82 ENCOUNTER FOR (NT) NUCHAL TRANSLUCENCY SCAN: Primary | ICD-10-CM

## 2022-04-06 PROBLEM — Z82.49 FAMILY HISTORY OF DEEP VEIN THROMBOSIS: Status: ACTIVE | Noted: 2022-04-06

## 2022-04-06 PROBLEM — Z00.00 ANNUAL PHYSICAL EXAM: Status: RESOLVED | Noted: 2021-10-04 | Resolved: 2022-04-06

## 2022-04-06 PROCEDURE — 76813 OB US NUCHAL MEAS 1 GEST: CPT | Performed by: OBSTETRICS & GYNECOLOGY

## 2022-04-06 PROCEDURE — 1036F TOBACCO NON-USER: CPT | Performed by: OBSTETRICS & GYNECOLOGY

## 2022-04-06 PROCEDURE — 99203 OFFICE O/P NEW LOW 30 MIN: CPT | Performed by: OBSTETRICS & GYNECOLOGY

## 2022-04-06 RX ORDER — ONDANSETRON 4 MG/1
4 TABLET, ORALLY DISINTEGRATING ORAL EVERY 6 HOURS PRN
Qty: 20 TABLET | Refills: 0 | Status: SHIPPED | OUTPATIENT
Start: 2022-04-06 | End: 2022-08-01

## 2022-04-06 NOTE — PROGRESS NOTES
Rich Fang: Ms Pedro Leyden was seen today at 13w4d for nuchal translucency ultrasound  See ultrasound report under "OB Procedures" tab  My recommendations are as follows:  1  We reviewed the availability of genetic screening, as well as diagnostic testing, which are available to all pregnant women  We reviewed limitations, risks, and benefits of screening and testing  We reviewed the differences between Sequential Screen and NIPT (non-invasive prenatal screening) as well as that insurance coverage and therefore out-of-pocket costs may vary  She elected to proceed with NIPT, and was provided a lab requisition to have it drawn, as well as information on how to estimate her out of pocket cost and need for possible prior authorization  MSAFP screening should be ordered through your office at 15-20 weeks gestation, and completed prior to fetal anatomic survey  She does not wish to pursue diagnostic testing at this time  A detailed anatomic survey as well as transvaginal cervical length screening are recommended between 18-22 weeks gestation  2  We reviewed her family history of venous thromboembolism in her father  She has no personal history of thromboembolism  I asked her to inquire as to whether there is any known inherited thrombophilia  If there is a familial thrombophilia, she should be selectively tested for that mutation only  Presence of a thrombophilia would alter recommendations for antepartum and postpartum prophylaxis against thromboembolism  3  We discussed her nausea/vomiting, which are painless  She has had minimal relief from unisom/B6  She asked for an option other than a pill, and we discussed ODT Zofran or phenergan suppositories, and she preferred Zofran ODT, which was prescribed  I advised that if symptoms do not improve with supportive care, or if she has pain or progressive symptoms, she needs to contact her OB/GYN       Please don't hesitate to contact our office with any concerns or questions     -Gabino Thomas MD

## 2022-04-06 NOTE — LETTER
April 6, 2022     GABRIEL Huitron  [address]  [address] [facility]    Patient: Luis Angel Goode   YOB: 1995   Date of Visit: 4/6/2022       Dear Dr Lindsey White: Thank you for referring Georgia Walker to me for evaluation  Below are my notes for this consultation  If you have questions, please do not hesitate to call me  I look forward to following your patient along with you  Sincerely,        Mohsen Irvin MD        CC: No Recipients  Alannah Hubbard MD  4/6/2022  2:12 PM  Sign when Signing Visit  [address]: Ms Minnie Robles was seen today at 13w4d for nuchal translucency ultrasound  See ultrasound report under "OB Procedures" tab  My recommendations are as follows:  1  We reviewed the availability of genetic screening, as well as diagnostic testing, which are available to all pregnant women  We reviewed limitations, risks, and benefits of screening and testing  We reviewed the differences between Sequential Screen and NIPT (non-invasive prenatal screening) as well as that insurance coverage and therefore out-of-pocket costs may vary  She elected to proceed with NIPT, and was provided a lab requisition to have it drawn, as well as information on how to estimate her out of pocket cost and need for possible prior authorization  MSAFP screening should be ordered through your office at 15-20 weeks gestation, and completed prior to fetal anatomic survey  She does not wish to pursue diagnostic testing at this time  A detailed anatomic survey as well as transvaginal cervical length screening are recommended between 18-22 weeks gestation  2  We reviewed her family history of venous thromboembolism in her father  She has no personal history of thromboembolism  I asked her to inquire as to whether there is any known inherited thrombophilia   If there is a familial thrombophilia, she should be selectively tested for that mutation only  Presence of a thrombophilia would alter recommendations for antepartum and postpartum prophylaxis against thromboembolism  3  We discussed her nausea/vomiting, which are painless  She has had minimal relief from unisom/B6  She asked for an option other than a pill, and we discussed ODT Zofran or phenergan suppositories, and she preferred Zofran ODT, which was prescribed  I advised that if symptoms do not improve with supportive care, or if she has pain or progressive symptoms, she needs to contact her OB/GYN       Please don't hesitate to contact our office with any concerns or questions     -Stephy Wade MD

## 2022-04-06 NOTE — PATIENT INSTRUCTIONS
Please refer to " Ultrasound" under test results in [facility] for today's ultrasound findings  Congratulations, and best wishes for a healthy remainder of the pregnancy! You elected to have non-invasive screening for genetic syndrome performed for your pregnancy  This involves a blood test to check for the four most common genetic syndromes (Trisomy 21, Trisomy 13, Trisomy 25, and sex chromosome abnormalities)  It also will report the biologic sex of the fetus  Results will be visible in your [employer] portal 7-10 business days from when the test is drawn  Please follow all instructions regarding determining out of pocket cost for the test (as provided by our nursing staff today), as well as follow any instructions regarding need for prior authorization before having the test drawn  Please contact our office with any concerns or questions  You will need spina bifida screening (called MSAFP) for the baby beginning at 15 weeks gestation, which will be ordered by your obstetrician's office  This test allows for earlier detection of spina bifida than is possible by ultrasound, and is advised in all pregnancies  Please ask your father about whether he had testing for underlying blood clotting disorders (thrombophilia)  Thank you for choosing [address] [place] [address] for your visit today  We appreciate your trust and the opportunity to assist your obstetrician with your care  We value your feedback regarding the care we are providing  Following today's appointment, you may receive a patient satisfaction survey by mail or e-mail requesting feedback on your visit  We ask that you complete the survey to  help us understand how we are doing  Thank you for in advance for your feedback

## 2022-04-06 NOTE — PROGRESS NOTES
Patient chose to use "[facility] lab and was given lab slip for the Noninvasive Prenatal Screen -[facility] Qnatal [facility]  Blood sample is drawn at "[facility] and online appointment scheduling and lab locations can be found @ www  [facility]     Qnatal  card given, patient instructed how to check her out- of -pocket responsibility @ [employer]  Patient aware that  is provided by third party and is only an estimate of cost ,not a guarantee  For definitive cost, patient encouraged to call her insurance provider- insurance phone # located on the back of her ID card  Some Insurance plans may require prior authorization before blood is drawn  Patient instructed to check with her plan before she goes to lab and notify [facility]  Patient made aware she may be responsible for full cost of test if lab drawn before prior authorization obtained  Insurance Authorization may take up to 14 business days, patient made aware insurance authorization does not mean test is covered 100%  Information on how to check OOP NIPT coverage/ check if a prior authorization needed for NIPT was also provided to patient during the appointment scheduling of her [facility] NT appt  Patient made aware Qnatal results typically are available in 7-10 business days after blood draw and to call [facility] if she does not receive notification of her results  Patient made aware that viewing Qnatal results online will reveal gender  Patient verbalized understanding of all instructions and no questions at this time

## 2022-04-10 ENCOUNTER — TELEPHONE (OUTPATIENT)
Dept: LABOR AND DELIVERY | Facility: HOSPITAL | Age: 27
End: 2022-04-10

## 2022-04-10 ENCOUNTER — NURSE TRIAGE (OUTPATIENT)
Dept: OTHER | Facility: OTHER | Age: 27
End: 2022-04-10

## 2022-04-11 NOTE — TELEPHONE ENCOUNTER
Spoke with patient this am  Denies bleeding and discomfort  Pt would like to come to office this week for fetal tones  Apt available everyday and patient would like to come Thursday 4/15  Pt aware to call office with any vaginal bleeding, cramping or discomfort before the apt   She is agreeable

## 2022-04-11 NOTE — TELEPHONE ENCOUNTER
Regarding: 15 wks/ bleeding after intercourse  ----- Message from [facility] sent at 4/10/2022  9:43 PM EDT -----  "I am 14 weeks pregnant and I had sex and there was a lot of bleeding after and im wondering if I should be seen or what I should do"

## 2022-04-11 NOTE — TELEPHONE ENCOUNTER
Paged on call provider with patient's concerns as listed in assessment  Provider will call patient directly  Reason for Disposition   SPOTTING after sexual intercourse (single or brief episode)    Answer Assessment - Initial Assessment Questions  1  ONSET: "When did this bleeding start?"        After intercourse, about 5 minutes ago     2  DESCRIPTION: "Describe the bleeding that you are having " "How much bleeding is there?"     - SPOTTING: spotting, or pinkish / brownish mucous discharge; does not fill panti-liner or pad     - MILD:  less than 1 pad / hour; less than patient's usual menstrual bleeding    - MODERATE: 1-2 pads / hour; 1 menstrual cup every 6 hours; small-medium blood clots (e g , pea, grape, small coin)    - SEVERE: soaking 2 or more pads/hour for 2 or more hours; 1 menstrual cup every 2 hours; bleeding not contained by pads or continuous red blood from vagina; large blood clots (e g , golf ball, large coin)       Blood was all over legs, bed and when wiping there was a lot of blood more than a period, now bleeding is slowed down, just when wiping     3  ABDOMINAL PAIN SEVERITY: If present, ask: "How bad is it?"  (e g , Scale 1-10; mild, moderate, or severe)    - MILD (1-3): doesn't interfere with normal activities, abdomen soft and not tender to touch     - MODERATE (4-7): interferes with normal activities or awakens from sleep, tender to touch     - SEVERE (8-10): excruciating pain, doubled over, unable to do any normal activities      Denies     4  PREGNANCY: "Do you know how many weeks or months pregnant you are?" "When was the first day of your last normal menstrual period?"      14 weeks     5  HEMODYNAMIC STATUS: "Are you weak or feeling lightheaded?" If Yes, ask: "Can you stand and walk normally?"       Denies     6   OTHER SYMPTOMS: "What other symptoms are you having with the bleeding?" (e g , passed tissue, vaginal discharge, fever, menstrual-type cramps)      Anxious    Protocols used: PREGNANCY - VAGINAL BLEEDING LESS THAN 20 WEEKS EGA-ADULT-AH

## 2022-04-11 NOTE — TELEPHONE ENCOUNTER
TELEPHONE CALL  [facility] is a 23 y o  Fifi Morrissey at 14w1d who called with bright red vaginal bleeding after intercourse  It has since slowed to spotting with wiping  Blood type is B positive      Recommendation:  - Monitor symptoms, if bleeding or cramping increase can consider ED evaluation  - If bleeding remains resolves, expect a call from our office RN tomorrow to check in and can offer office visit for doptones for reassurance       Rita Douglas MD  [facility] [address] [place] [address]  04/10/22 10:19 PM

## 2022-04-13 LAB
# FETUSES US: 1
CFDNA.FET/TOTAL PLAS.CFDNA: NORMAL
FET CHR 13 TS PLAS.CFDNA QL: NEGATIVE
FET CHR 18 TS PLAS.CFDNA QL: NEGATIVE
FET CHR 21 TS PLAS.CFDNA QL: NEGATIVE
FET CHR X + Y ANEUP PLAS.CFDNA QL: NORMAL
FET CHROM X + Y ANEUP CFDNA IMP: NORMAL
FET Y CHROM PLAS.CFDNA QL: DETECTED
FET Y CHROM PLAS.CFDNA: NORMAL
GA (DAYS): 5 D
GA (WEEKS): 13 WK
MICRODELETION SYND BLD/T FISH: NORMAL
REF LAB TEST METHOD: NORMAL
SERVICE CMNT-IMP: NORMAL
SERVICE CMNT-IMP: NORMAL
SL AMB ABNORMAL MSS?: NORMAL
SL AMB ABNORMAL US?: NORMAL
SL AMB ADVANCED MATERNAL AGE?: NO
SL AMB MICRODELETION INTERP: NORMAL
SL AMB MICRODELETION: NORMAL
SL AMB PERSONAL/FAM HISTORY?: NORMAL
SL AMB SPECIFICATIONS: NORMAL

## 2022-04-14 ENCOUNTER — CLINICAL SUPPORT (OUTPATIENT)
Dept: OBGYN CLINIC | Facility: MEDICAL CENTER | Age: 27
End: 2022-04-14

## 2022-04-14 DIAGNOSIS — Z3A.14 14 WEEKS GESTATION OF PREGNANCY: Primary | ICD-10-CM

## 2022-04-14 NOTE — PROGRESS NOTES
Pt here for dop tones after episode of bleeding after intercourse over the weekend  Pt states she did have some spotting infrequently with wiping  No spotting wed, or Thursday this week  Denies cramping and discomfort and heavy bleeding  Tones noted at 155  Advised patient to increase fluid intake and to call the office with any increased vaginal bleeding or discomfort   PT agreeable and scheduled for nest visit

## 2022-04-18 ENCOUNTER — OFFICE VISIT (OUTPATIENT)
Dept: RHEUMATOLOGY | Facility: CLINIC | Age: 27
End: 2022-04-18
Payer: COMMERCIAL

## 2022-04-18 VITALS — BODY MASS INDEX: 23.7 KG/M2 | WEIGHT: 175 LBS | HEIGHT: 72 IN

## 2022-04-18 DIAGNOSIS — R59.1 LYMPHADENOPATHY: ICD-10-CM

## 2022-04-18 DIAGNOSIS — R76.8 POSITIVE ANA (ANTINUCLEAR ANTIBODY): ICD-10-CM

## 2022-04-18 PROCEDURE — 99204 OFFICE O/P NEW MOD 45 MIN: CPT | Performed by: INTERNAL MEDICINE

## 2022-04-18 PROCEDURE — 3008F BODY MASS INDEX DOCD: CPT | Performed by: OBSTETRICS & GYNECOLOGY

## 2022-04-18 NOTE — PATIENT INSTRUCTIONS
Do lab    Return to clinic as needed if new or worsening symptoms    Connective Tissue Disorders   AMBULATORY CARE:   A connective tissue disorder  can affect any connective tissue in your body  Connective tissues support your organs, attach muscles to bones, and create scar tissue after an injury  Cartilage is an example of connective tissue  There are many types of connective tissue disorders, such as rheumatoid arthritis, lupus, and scleroderma  The most common affected areas are joints, muscles, and skin  Your organs, eyes, nervous system, and blood vessels can also be affected  Common signs and symptoms of a connective tissue disorder:  Signs and symptoms depend on the type of connective tissue disorder and if it is severe  Symptoms may be mild or severe, and may come and go:  · Fever or fatigue    · Skin rash or thickening, blisters, or sensitivity to sunlight    · Rash on your cheeks that goes across your nose    · Joint pain, swelling, or warmth    · Deformed joints, or limited range of motion    · Cold, numb, or swollen fingers    · Loss of appetite, or weight loss without trying    · Dry mouth or eyes, vision problems, or an eye infection such as conjunctivitis    · Hair loss    Call 911 for any of the following:   · You are sweating, and your lips are pale or blue  · You have trouble breathing, chest pain or pressure, or a fast heartbeat  · You are vomiting blood  · You have a high fever  Seek care immediately if:   · You lose feeling in your hands or feet  · You lose feeling on one side of your body  · You have sudden pain in your eyes and vision problems  Contact your healthcare provider if:   · You have trouble urinating, or you urinate less than usual     · You have trouble having a bowel movement, or you lose control of your bowel movements  · Your muscle or joint tightness worsens, or your fingers begin to curl      · Your symptoms get worse, even after treatment  · Your skin is itchy, swollen, or has a rash  · You have questions or concerns about your condition or care  Treatment  may include any of the following:  · Medicines  may be given to prevent your immune system from attacking healthy cells  You may also need medicines to stop the disease from getting worse  You may need to use topical creams or lotions to control a rash or other symptoms that affect your skin  · Prescription pain medicine  may be given  Ask your healthcare provider how to take this medicine safely  Some prescription pain medicines contain acetaminophen  Do not take other medicines that contain acetaminophen without talking to your healthcare provider  Too much acetaminophen may cause liver damage  Prescription pain medicine may cause constipation  Ask your healthcare provider how to prevent or treat constipation  · NSAIDs , such as ibuprofen, help decrease swelling, pain, and fever  This medicine is available with or without a doctor's order  NSAIDs can cause stomach bleeding or kidney problems in certain people  If you take blood thinner medicine, always ask your healthcare provider if NSAIDs are safe for you  Always read the medicine label and follow directions  · Acetaminophen  helps reduce pain and fever  Acetaminophen is available without a doctor's order  Ask how much to take and how often to take it  Follow directions  Acetaminophen can cause liver problems if not taken correctly  · Steroids  may be given to reduce swelling and pain  Manage your connective tissue disorder:   · Rest as needed  Talk to your healthcare provider if you are having trouble sleeping because of pain or other symptoms  Rest your joints if they are stiff or painful  Your healthcare provider may suggest support devices such as crutches or splints to help your joints rest      · Eat a variety of healthy foods    Healthy foods include fruits, vegetables, lean meats, fish, and low-fat dairy products  Work with your healthcare provider or dietitian to create healthy meal plans  · Go to physical or occupational therapy as directed  A physical therapist can help you create an exercise plan  Exercise may help increase your energy  Exercise can also help keep stiff joints flexible and increase range of motion  An occupational therapist can help you learn to do your daily activities when you have pain or swelling  · Talk to your healthcare provider about pregnancy  If you are a woman and want to get pregnant, talk to your healthcare provider  You or your baby might be at risk for complications  You may need to wait until your disease is controlled or your medications are finished before you get pregnant  You may also have trouble getting pregnant because of your disease  Your healthcare provider may be able to suggest ways to improve your ability to become pregnant  · Do not smoke  Nicotine and other chemicals in cigarettes and cigars can cause blood vessel and lung damage  Ask your healthcare provider for information if you currently smoke and need help to quit  E-cigarettes or smokeless tobacco still contain nicotine  Talk to your healthcare provider before you use these products  · Manage stress  Stress may slow healing and lead to illness  Learn ways to control stress, such as relaxation, deep breathing, or listening to music  Manage flares:  A flare means something triggered your symptoms  Stress, cold weather, and sunlight are examples of triggers  Your healthcare provider can help you create a management plan that includes what to do if you have a flare  Treat flares quickly to help prevent serious illness  · Apply ice or heat as directed  Ice helps reduce pain and swelling, and may help prevent tissue damage  Use an ice pack, or put crushed ice in a bag  Cover the bag with a towel and apply to the painful area for 15 to 20 minutes every hour, or as directed   Heat helps reduce pain and muscle spasms  Apply a warm compress to the area for 20 minutes every 2 hours, or as directed  · Elevate the area above the level of your heart  Elevation can help reduce swelling and pain, especially in your joints  Elevate the area as often as possible  · Keep your hands and feet warm  Certain connective tissue disorders can cause your hands and feet to become cold and painful  Over time, ulcers or gangrene (tissue death) may develop if frequent or severe attacks are not prevented  Dress warmly in cold weather, including gloves and thick socks  It may help to wiggle your fingers or toes to improve circulation  Follow up with your healthcare provider as directed: You may need ongoing tests or treatment  Write down your questions so you remember to ask them during your visits  © Copyright [employer] 2022 Information is for End User's use only and may not be sold, redistributed or otherwise used for commercial purposes  All illustrations and images included in CareNotes® are the copyrighted property of [employer]  or [facility]   The above information is an  only  It is not intended as medical advice for individual conditions or treatments  Talk to your doctor, nurse or pharmacist before following any medical regimen to see if it is safe and effective for you

## 2022-04-18 NOTE — PROGRESS NOTES
Assessment and Plan:   Cher Jason is a 23 y o   female who presents as a Rheumatology consult referred by Lisbeth Lion DO for evaluation of possible autoimmune disease given positive MARCO of 1:40 and history of lymphadenopathy  In September, patient had right axillary area swelling, which was evaluated and found to be benign, ad his since then self-resolved  She is currently 15 weeks pregnancy, an has no signs/symptoms of lupus or any connective tissue disease  Sjogren's antibodies are negative as well  Patient likely had a false positive MARCO  Plan:  Diagnoses and all orders for this visit:    Positive MARCO (antinuclear antibody)  -     Ambulatory referral to Rheumatology  -     Sjogren's Antibodies    Lymphadenopathy  -     Ambulatory referral to Rheumatology  Follow-up plan: return to clinic as needed if have new or worsening symptoms  HPI  Cher Jason is a 23 y o   female who presents as a Rheumatology consult referred by Lisbeth Lion DO for evaluation of positive MARCO at 15 weeks of pregnancy  Patient states that she started having a swelling in her right axilla in September 2021  Started having really bad shoulder pain and swelling in that area  Has a bump in that area that is tender to the touch  Hasn't noticed swelling since November, but  to touch  Denies photosensitivity, rashes, psoriasis, sicca symptoms, oral or nasal ulcers, alopecia, Raynaud's, h/o pericarditis or pleurisy, or h/o blood clots or miscarriages  Review of Systems  Review of Systems   Constitutional: Negative for fatigue, fever and unexpected weight change  HENT: Negative for mouth sores and sore throat  Eyes: Negative  Respiratory: Negative  Negative for cough and shortness of breath  Cardiovascular: Negative  Negative for chest pain and leg swelling  Gastrointestinal: Positive for constipation and nausea  Negative for abdominal pain and diarrhea  Indigestion/heartburn   Musculoskeletal: Negative  Negative for arthralgias, back pain, joint swelling and myalgias  Skin: Negative  Negative for color change and rash  Neurological: Negative for weakness  Hematological: Negative for adenopathy  Psychiatric/Behavioral: Negative for sleep disturbance  Reviewed and agree  Allergies  Allergies   Allergen Reactions    Clindamycin Throat Swelling    Sumatriptan Nausea Only and Throat Swelling    Penicillins Rash       Home Medications    Current Outpatient Medications:     ondansetron (Zofran ODT) 4 mg disintegrating tablet, Take 1 tablet (4 mg total) by mouth every 6 (six) hours as needed for nausea or vomiting, Disp: 20 tablet, Rfl: 0    Prenatal Vit-Fe Fumarate-FA (PRENATAL VITAMINS PO), Take by mouth, Disp: , Rfl:     Past Medical History  Past Medical History:   Diagnosis Date    Common wart     Resolved 7/29/2016     Ganglion cyst     Resolved 7/29/2016     Migraine with aura     Resolved 12/27/2016     Varicella     vaccine       Past Surgical History   Past Surgical History:   Procedure Laterality Date    DENTAL SURGERY         Family History    Family History   Problem Relation Age of Onset    Arthritis Mother     Deep vein thrombosis Father     Colon cancer Maternal Grandmother     No Known Problems Sister     Prostate cancer Maternal Grandfather     Skin cancer Maternal Grandfather     Hypertension Maternal Grandfather     Cancer Maternal Grandfather         bladder cancer     Ovarian cancer Paternal Grandmother     Breast cancer Maternal Aunt         unsure age      No known family history of autoimmune or inflammatory diseases      Social History  Occupation: teacher, [place], speech therapist  Social History     Substance and Sexual Activity   Alcohol Use Not Currently     Social History     Substance and Sexual Activity   Drug Use No     Social History     Tobacco Use   Smoking Status Never Smoker   Smokeless Tobacco Never Used       Objective:  Vitals:    04/18/22 0830   Weight: 79 4 kg (175 lb)   Height: 6' (1 829 m)       Physical Exam  Constitutional:       General: She is not in acute distress  HENT:      Head: Normocephalic and atraumatic  Eyes:      Conjunctiva/sclera: Conjunctivae normal    Cardiovascular:      Rate and Rhythm: Normal rate and regular rhythm  Heart sounds: S1 normal and S2 normal  No friction rub  Pulmonary:      Effort: Pulmonary effort is normal  No respiratory distress  Breath sounds: Normal breath sounds  No wheezing, rhonchi or rales  Musculoskeletal:      Cervical back: Neck supple  Lymphadenopathy:      Cervical: No cervical adenopathy  Skin:     General: Skin is warm and dry  Coloration: Skin is not pale  Findings: No rash  Neurological:      Mental Status: She is alert  Mental status is at baseline  Psychiatric:         Mood and Affect: Mood normal          Behavior: Behavior normal        Reviewed labs and imaging  Imaging:   Right axillary ultrasound 10/12/21 - unremarkable    Labs:   Orders Only on 04/18/2022   Component Date Value Ref Range Status    Sjogren's Antibody (SS-A) 04/18/2022 <1 0 NEG  <1 0 NEG AI Final    Sjogren's Antibody (SS-B) 04/18/2022 <1 0 NEG  <1 0 NEG AI Final   Orders Only on 04/07/2022   Component Date Value Ref Range Status    Number of Fetuses 04/07/2022 1   Final    Advanced Maternal Age? 04/07/2022 NO   Final    Abnormal CLARITZA? 04/07/2022 NOT GIVEN   Final    Abnormal US? 04/07/2022 NOT GIVEN   Final    Personal/Fam History? 04/07/2022 NOT GIVEN   Final    Interpretation 04/07/2022 SEE NOTE   Final    Comment:  This specimen showed expected representation of chromosome  21, 18, and 13 material  Microdeletion testing was not  performed per clinician request       Trisomy 21 (T21) 04/07/2022 Negative   Final    Trisomy 18 (T18) 04/07/2022 Negative   Final    Trisomy 13 (T13) 04/07/2022 Negative   Final    Y Chromosome 04/07/2022 Detected   Final    Y Chromosome Interp 04/07/2022 SEE NOTE   Final    Consistent with a male fetus   Sex Chromosome 04/07/2022 No aneuploidy   Final    Sex Chromosome Interp 04/07/2022 SEE NOTE   Final    No apparent abnormality was detected  See "Limitations" below   Microdeletion 04/07/2022 Opted Out   Final    Microdeletion Interp 04/07/2022 SEE NOTE   Final    Microdeletion testing was not performed per clinician request     Weeks Gestation 04/07/2022 13   Final    Gestational Age (In Days) 04/07/2022 5   Final    Fetal Fraction 04/07/2022 17 29%   Final    Comment(s) 04/07/2022 SEE NOTE   Final    Comment: Laboratory results and submitted clinical information  reviewed by Marino Bunn, Ph D , Prabhu Han   Limitations: 04/07/2022 SEE NOTE   Final    Comment: This test has been validated on women with a steel  pregnancy that is >=10 weeks gestational age  As such, the  accuracy of this test for specimens drawn at less than 10  weeks gestation is unknown  In addition, there are limited  data available for the performance of this test in multiple  gestation pregnancies and for the detection of  microdeletions  Specimens are analyzed for aneuploidies  involving chromosomes 21, 18, 13, X, and Y, and  microdeletions of the specified regions only  The Y  chromosome is analyzed for the determination of fetal sex,  and the sensitivity and specificity of this analysis may be  less than that of the autosome analysis  Sex chromosome  aneuploidy analysis is not performed for multiple gestation  pregnancies  Aneuploidies involving chromosomes other than  those specified above or abnormalities involving chromosomal  regions other than those specified are not included in this  testing   While the results of this test are highly accurate,  not all of th                           e chromosome abnormalities interrogated may be  detected due to maternal, placental, or fetal mosaicism, or  other unexplained causes  The accuracy of the test results  may also be affected by the presence of chromosome  abnormalities or copy number variations that are maternal in  origin, or by vanishing twin syndrome in a multiple  gestation pregnancy  Circulating cell-free fetal DNA  screening does not replace the precision of diagnosis using  chorionic villus sampling or amniocentesis  It does not  assess the risk of fetal anomalies such as neural tube  defects or ventral wall defects, and should not be  considered in isolation from other clinical findings and  laboratory test results  Management decisions, including  pregnancy termination, should not be based solely on the  results of cell-free DNA screening  A negative test result  does not ensure an unaffected pregnancy  The healthcare  provider is responsible for the use of this information in  the management of his/her patient                             Health care providers, please contact your local [address]  genetic counselor or call [employer] Client  Services at [employer] ([phone]) for assistance with  interpretation of these results   Specifications 04/07/2022 SEE NOTE   Final    Comment: Sensitivity Specificity T21 >99 9% >99 9%     T18        >99 9%               >99 9%     T13        >99 9%               >99 9%               Accuracy  Y          >99 9%     Performance of the [employer] laboratory-developed test  (LDT) has been determined based on internal analytical  assessment   Method 04/07/2022 SEE NOTE   Final    Comment: Circulating cell-free (cf) DNA was isolated from plasma  It  was then detected on a massively parallel sequencing  platform  Bioinformatic analysis was performed to determine  the representation of fetal DNA in the specimen, especially  fetal material from chromosomes 21, 18, and 13   The  representation of other fetal material, including the sex  chromosomes (X and Y) and select chromosomal regions (22q,  15q, 11q, 8q, 5p, 4p 1p), was also evaluated and will only  be reported as "Additional Chromosome Results" when an  abnormality is detected  This test was developed and its  performance characteristics have been determined by [facility], [address] [place] [address]  It has  not been cleared or approved by the [place]  Food and Drug  Administration  The FDA has determined that such clearance  or approval is not necessary  Performance characteristics  refer to the analytical performance of the test  This test  is performed pursuant to a license agreement with [employer]  This test was developed and its analytical performance  characteristics have been determined by [facility]  It has not been  cleared or approved by FDA  This assay has been validated  pursuant to the CLIA regulations and is used for clinical  purposes  Initial Prenatal on 03/28/2022   Component Date Value Ref Range Status    External Antibody Screen 03/08/2022 Normal   Final    Hematocrit 03/08/2022 36 8  % Final    External Hemoglobin 03/08/2022 12 2  g/dL Final    External Platelets 06/97/3844 233  K/µL Final    External Rh Factor 03/08/2022 Positive   Final    HIV-1/HIV-2 AB 03/08/2022 Non-Reactive   Final    Hepatitis B Surface Ag 03/08/2022 neg   Final    RPR 03/08/2022 Non-Reactive   Final    External Rubella IGG Quantitation 03/08/2022 immune   Final    Chlamydia Swab, TMA Aptima 03/28/2022 NOT DETECTED  NOT DETECTED Final    Neisseria Gonorrhoeae RNA, TMA 03/28/2022 NOT DETECTED  NOT DETECTED Final    Always Message 03/28/2022    Final    Comment: The analytical performance characteristics of this  assay, when used to test SurePath(TM) specimens have been  determined by [facility]  The modifications have  not been cleared or approved by the FDA   This assay has  been validated pursuant to the CLIA regulations and is  used for clinical purposes  For additional information, please refer to  https://[employer]/faq/OHT915  (This link is being provided for information/  educational purposes only )        Initial Prenatal on 03/03/2022   Component Date Value Ref Range Status    White Blood Cell Count 03/08/2022 10 7  3 8 - 10 8 Thousand/uL Final    Red Blood Cell Count 03/08/2022 4 08  3 80 - 5 10 Million/uL Final    Hemoglobin 03/08/2022 12 2  11 7 - 15 5 g/dL Final    HCT 03/08/2022 36 8  35 0 - 45 0 % Final    MCV 03/08/2022 90 2  80 0 - 100 0 fL Final    MCH 03/08/2022 29 9  27 0 - 33 0 pg Final    MCHC 03/08/2022 33 2  32 0 - 36 0 g/dL Final    RDW 03/08/2022 12 5  11 0 - 15 0 % Final    Platelet Count 86/93/2968 233  140 - 400 Thousand/uL Final    SL AMB MPV 03/08/2022 10 9  7 5 - 12 5 fL Final    Neutrophils (Absolute) 03/08/2022 6,591  1,500 - 7,800 cells/uL Final    Lymphocytes (Absolute) 03/08/2022 3,285  850 - 3,900 cells/uL Final    Monocytes (Absolute) 03/08/2022 728  200 - 950 cells/uL Final    Eosinophils (Absolute) 03/08/2022 64  15 - 500 cells/uL Final    Basophils ABS 03/08/2022 32  0 - 200 cells/uL Final    Neutrophils 03/08/2022 61 6  % Final    Lymphocytes 03/08/2022 30 7  % Final    Monocytes 03/08/2022 6 8  % Final    Eosinophils 03/08/2022 0 6  % Final    Basophils PCT 03/08/2022 0 3  % Final    SL AMB ANTIBODY SCREEN, RBC W/REFL* 03/08/2022 NO ANTIBODIES DETECTED   Final    Comment:                 Reference range                  No antibodies detected     This assay is a screening test for the detection of   red blood cell antibodies  The test is not to be used   for pretransfusion screening or for the medical   management of an alloimmunized pregnancy            ABO Grouping 03/08/2022 B   Final    Rh Type 03/08/2022 RH(D) POSITIVE   Final    Comment:    For additional information, please refer to http://logolineup/faq/IKB969   (This link is being provided for informational/  educational purposes only )      RPR 03/08/2022 NON-REACTIVE  NON-REACTIVE Final    HBsAg Screen 03/08/2022 NON-REACTIVE  NON-REACTIVE Final    Rubella IgG Scr 03/08/2022 4 88  Index Final    Comment:     Index            Interpretation      -----            --------------        <0 90            Not consistent with immunity      0 90-0 99        Equivocal      > or = 1 00      Consistent with immunity      The presence of rubella IgG antibody suggests   immunization or past or current infection with  rubella virus   HIV AG/AB, 4th Gen 03/08/2022 NON-REACTIVE  NON-REACTIVE Final    Comment: HIV-1 antigen and HIV-1/HIV-2 antibodies were not  detected  There is no laboratory evidence of HIV  infection  PLEASE NOTE: This information has been disclosed to  you from records whose confidentiality may be  protected by state law  If your state requires such  protection, then the state law prohibits you from  making any further disclosure of the information  without the specific written consent of the person  to whom it pertains, or as otherwise permitted by law  A general authorization for the release of medical or  other information is NOT sufficient for this purpose  For additional information please refer to  http://[employer]/faq/SQJ823  (This link is being provided for informational/  educational purposes only )        The performance of this assay has not been clinically  validated in patients less than 3years old   Urine Culture Result 03/08/2022    Final    Comment:     CULTURE, URINE, ROUTINE         Micro Number:      [id number]    Test Status:       Final    Specimen Source:   Urine, clean catch    Specimen Quality:  Adequate    Result:            Mixed genital rhonda isolated   These superficial                       bacteria are not indicative of a urinary tract infection  No further organism identification is                       warranted on this specimen  If clinically                       indicated, recollect clean-catch, mid-stream                       urine and transfer immediately to Urine Culture                       Transport Tube  Orders Only on 01/25/2022   Component Date Value Ref Range Status    HCG, Quantitative 02/08/2022 22,916  mIU/mL Final    Comment: Gestational Age   Expected hCG values (mIU/mL)  <1 Week:                 5-50  1-2 Weeks:                 2-3 Weeks:               100-5000  3-4 Weeks:               500-12062  4-5 Weeks:               1000-92301  5-6 Weeks:               03234-043239  6-8 Weeks:               48492-152958  2-3 Months:              14555-093043  The table above provides only a very rough estimate of  gestational age and should be used only in conjunction  with other methods for establishing gestational age  Much more reliable and accurate estimations of gestational  age may be obtained by using LMP or ultrasound  Values from different assay methods may vary  The use of this assay to monitor or to diagnose   patients with cancer or any condition unrelated  to pregnancy has not been cleared or approved by  the FDA or the  of the assay        Progesterone 02/08/2022 23 8  ng/mL Final    Comment:             Reference Ranges           Female              Follicular Phase     < 1 0              Luteal Phase      2 6-21 5              Post menopausal      < 0 5              Pregnancy              1st Trimester     4 1-34 0              2nd Trimester    24 0-76 0              3rd Trimester   52 0-302 0     Orders Only on 11/12/2021   Component Date Value Ref Range Status    Glucose, Random 11/12/2021 116  65 - 139 mg/dL Final    Comment:           Non-fasting reference interval         BUN 11/12/2021 8  7 - 25 mg/dL Final    Creatinine 11/12/2021 0 64  0 50 - 1 10 mg/dL Final  eGFR Non  11/12/2021 124  > OR = 60 mL/min/1 73m2 Final    eGFR  11/12/2021 144  > OR = 60 mL/min/1 73m2 Final    SL AMB BUN/CREATININE RATIO 78/13/8621 NOT APPLICABLE  6 - 22 (calc) Final    Sodium 11/12/2021 137  135 - 146 mmol/L Final    Potassium 11/12/2021 4 5  3 5 - 5 3 mmol/L Final    Chloride 11/12/2021 105  98 - 110 mmol/L Final    CO2 11/12/2021 27  20 - 32 mmol/L Final    Calcium 11/12/2021 9 3  8 6 - 10 2 mg/dL Final    Protein, Total 11/12/2021 6 5  6 1 - 8 1 g/dL Final    Albumin 11/12/2021 4 3  3 6 - 5 1 g/dL Final    Globulin 11/12/2021 2 2  1 9 - 3 7 g/dL (calc) Final    Albumin/Globulin Ratio 11/12/2021 2 0  1 0 - 2 5 (calc) Final    TOTAL BILIRUBIN 11/12/2021 0 9  0 2 - 1 2 mg/dL Final    Alkaline Phosphatase 11/12/2021 45  31 - 125 U/L Final    AST 11/12/2021 30  10 - 30 U/L Final    ALT 11/12/2021 36* 6 - 29 U/L Final    LDH 11/12/2021 134  100 - 200 U/L Final    LD 1 11/12/2021 28  19 - 38 % Final    LD 2 11/12/2021 34  30 - 43 % Final    LD 3 11/12/2021 21  16 - 26 % Final    LD 4 11/12/2021 8  3 - 12 % Final    LD 5 11/12/2021 10  3 - 14 % Final    B HENSELAE IGG SER QL 11/12/2021 Negative   Final    B HENSELAE IGM SER QL 11/12/2021 Negative   Final    Comment: Reference Range: Negative     Whereas sera from 10% of healthy controls exhibit  Bartonella henselae IgG titers of 1:64-1:128, none  show titers of 1:256 or above  Sera from 95% of  patients with clinically defined cat scratch disease  show IgG titers of 1:64 and above; 79% exhibit titers  of 1:256 and above  IgM titers at 1:20 or higher have  not been detected in the normal adult population  Individuals infected with B  henselae may not  have initial IgG titers greater than or equal to 1:64;  confirmation of infection may therefore require  testing of serial specimens to detect increasing IgG  titers or the presence of IgM       This test was developed and its analytical performance  characteristics have been determined by "[employer]  It has  not been cleared or approved by the [place]  Food and Drug  Administration  This assay has been validated pursuant  to the CLIA regulations and is used for clinical  purposes   Sedimentation Rate 11/12/2021 2  < OR = 20 mm/h Final    White Blood Cell Count 11/12/2021 5 9  3 8 - 10 8 Thousand/uL Final    Red Blood Cell Count 11/12/2021 4 56  3 80 - 5 10 Million/uL Final    Hemoglobin 11/12/2021 13 4  11 7 - 15 5 g/dL Final    HCT 11/12/2021 39 9  35 0 - 45 0 % Final    MCV 11/12/2021 87 5  80 0 - 100 0 fL Final    MCH 11/12/2021 29 4  27 0 - 33 0 pg Final    MCHC 11/12/2021 33 6  32 0 - 36 0 g/dL Final    RDW 11/12/2021 12 8  11 0 - 15 0 % Final    Platelet Count 12/84/1166 233  140 - 400 Thousand/uL Final    SL AMB MPV 11/12/2021 11 8  7 5 - 12 5 fL Final    Neutrophils (Absolute) 11/12/2021 2,891  1,500 - 7,800 cells/uL Final    Lymphocytes (Absolute) 11/12/2021 2,354  850 - 3,900 cells/uL Final    Monocytes (Absolute) 11/12/2021 513  200 - 950 cells/uL Final    Eosinophils (Absolute) 11/12/2021 112  15 - 500 cells/uL Final    Basophils ABS 11/12/2021 30  0 - 200 cells/uL Final    Neutrophils 11/12/2021 49  % Final    Lymphocytes 11/12/2021 39 9  % Final    Monocytes 11/12/2021 8 7  % Final    Eosinophils 11/12/2021 1 9  % Final    Basophils PCT 11/12/2021 0 5  % Final    SL AMB LYME AB SCREEN 11/12/2021 <0 90  index Final    Comment:                    Index                Interpretation                     -----                --------------                     < 0 90               Negative                     0  90-1 09            Equivocal                     > 1 09               Positive      As recommended by the Food and Drug Administration   (FDA), all samples with positive or equivocal   results in a Borrelia burgdorferi antibody screen  will be tested using a blot method  Positive or   equivocal screening test results should not be   interpreted as truly positive until verified as such   using a supplemental assay (e g , B  burgdorferi blot)  The screening test and/or blot for B  burgdorferi   antibodies may be falsely negative in early stages  of Lyme disease, including the period when erythema   migrans is apparent   MARCO Screen, IFA 11/12/2021 POSITIVE* NEGATIVE Final    Comment: MARCO IFA is a first line screen for detecting the  presence of up to approximately 150 autoantibodies in  various autoimmune diseases  A positive MARCO IFA result  is suggestive of autoimmune disease and reflexes to  titer and pattern  Further laboratory testing may be  considered if clinically indicated  For additional information, please refer to  http://[employer]/faq/FWZ840  (This link is being provided for informational/  educational purposes only )          SL AMB MARCO TITER 11/12/2021 1:40* titer Final    Comment: A low level MARCO titer may be present in pre-clinical  autoimmune diseases and normal individuals  Reference Range                  <1:40        Negative                  1:40-1:80    Low Antibody Level                  >1:80        Elevated Antibody Level         SL AMB MARCO PATTERN 11/12/2021 Cytoplasmic, Polar/Golgi-like*  Final    Comment: Discontinuous speckled or granular perinuclear ribbon-  like staining with polar distribution in the cytoplasm  (e g , anti-giantin, anti-golgin-245)  Pattern is rare  in Sjogren's syndrome, systemic lupus erythematosus  (SLE), rheumatoid arthritis, mixed connective disease,  granulomatosis with polyangiitis (GPA), idiopathic  cerebellar ataxia, paraneoplastic cerebellar   degeneration, and viral infections       AC-22: Polar/Golgi-like     International Consensus on MARCO Patterns  (https://doi org/10 1515/poyn-3761-2463)      HIV AG/AB, 4th Gen 11/12/2021 NON-REACTIVE  NON-REACTIVE Final Comment: HIV-1 antigen and HIV-1/HIV-2 antibodies were not  detected  There is no laboratory evidence of HIV  infection  PLEASE NOTE: This information has been disclosed to  you from records whose confidentiality may be  protected by state law  If your state requires such  protection, then the state law prohibits you from  making any further disclosure of the information  without the specific written consent of the person  to whom it pertains, or as otherwise permitted by law  A general authorization for the release of medical or  other information is NOT sufficient for this purpose  For additional information please refer to  http://education  [employer]/faq/QBL162  (This link is being provided for informational/  educational purposes only )        The performance of this assay has not been clinically  validated in patients less than 3years old           RPR 11/12/2021 NON-REACTIVE  NON-REACTIVE Final

## 2022-04-19 LAB
ENA SS-A AB SER IA-ACNC: NORMAL AI
ENA SS-B AB SER IA-ACNC: NORMAL AI

## 2022-04-27 ENCOUNTER — ROUTINE PRENATAL (OUTPATIENT)
Dept: OBGYN CLINIC | Facility: MEDICAL CENTER | Age: 27
End: 2022-04-27

## 2022-04-27 VITALS — WEIGHT: 177 LBS | SYSTOLIC BLOOD PRESSURE: 104 MMHG | BODY MASS INDEX: 24.01 KG/M2 | DIASTOLIC BLOOD PRESSURE: 72 MMHG

## 2022-04-27 DIAGNOSIS — Z34.02 ENCOUNTER FOR SUPERVISION OF NORMAL FIRST PREGNANCY IN SECOND TRIMESTER: Primary | ICD-10-CM

## 2022-04-27 DIAGNOSIS — O21.9 NAUSEA AND VOMITING IN PREGNANCY: ICD-10-CM

## 2022-04-27 DIAGNOSIS — Z3A.16 16 WEEKS GESTATION OF PREGNANCY: ICD-10-CM

## 2022-04-27 PROCEDURE — PNV: Performed by: STUDENT IN AN ORGANIZED HEALTH CARE EDUCATION/TRAINING PROGRAM

## 2022-04-27 NOTE — PROGRESS NOTES
Routine Prenatal Visit  [facility] [address] [place] [address]    Assessment/Plan:  Candy Fish is a 23y o  year old  at 17w2d who presents for routine prenatal visit  1  Encounter for supervision of normal first pregnancy in second trimester    2  16 weeks gestation of pregnancy  Assessment & Plan:  - Normal NIPT, MS AFP ordered  - Level 2 scheduled    Orders:  -     Alpha fetoprotein, maternal; Future    3  Nausea and vomiting in pregnancy        Subjective:     CC: Prenatal care    Erick Suárez is a 23 y o   female who presents for routine prenatal care at 16w4d  Pregnancy ROS: Denies leakage of fluid, pelvic pain, or vaginal bleeding  The following portions of the patient's history were reviewed and updated as appropriate: allergies, current medications, past family history, past medical history, obstetric history, gynecologic history, past social history, past surgical history and problem list       Objective:  /72   Wt 80 3 kg (177 lb)   LMP 2021   BMI 24 01 kg/m²   Pregravid Weight/BMI: 78 5 kg (173 lb) (BMI 23 46)  Current Weight: 80 3 kg (177 lb)   Total Weight Gain: 1 814 kg (4 lb)   Pre- Vitals      Most Recent Value   Prenatal Assessment    Fetal Heart Rate 150   Prenatal Vitals    Blood Pressure 104/72   Weight - Scale 80 3 kg (177 lb)   Urine Albumin/Glucose    Dilation/Effacement/Station    Vaginal Drainage    Edema    LLE Edema None   RLE Edema None           General: Well appearing, no distress  Respiratory: Unlabored breathing  Cardiovascular: Regular rate  Abdomen: Soft, gravid, nontender  Fundal Height: Appropriate for gestational age  Extremities: Warm and well perfused  Non tender      Bonnie Hopkins MD  [address] [place] [address]  2022 3:51 PM

## 2022-05-09 LAB
# FETUSES US: 1
AFP ADJ MOM SERPL: 0.98
AFP INTERP SERPL-IMP: NORMAL
AFP SERPL-MCNC: 37.8 NG/ML
AGE: NORMAL
DONATED EGG PATIENT QL: NO
GA CLIN EST: 17.7 WEEKS
GA METHOD: NORMAL
HX OF NTD NARR: NO
HX OF TRISOMY 21 NARR: NO
IDDM PATIENT QL: NO
NEURAL TUBE DEFECT RISK FETUS: NORMAL %
SL AMB REPEAT SPECIMEN: NO

## 2022-05-25 ENCOUNTER — ROUTINE PRENATAL (OUTPATIENT)
Dept: OBGYN CLINIC | Facility: MEDICAL CENTER | Age: 27
End: 2022-05-25

## 2022-05-25 VITALS — SYSTOLIC BLOOD PRESSURE: 120 MMHG | BODY MASS INDEX: 23.87 KG/M2 | WEIGHT: 176 LBS | DIASTOLIC BLOOD PRESSURE: 70 MMHG

## 2022-05-25 DIAGNOSIS — Z3A.20 20 WEEKS GESTATION OF PREGNANCY: Primary | ICD-10-CM

## 2022-05-25 PROCEDURE — PNV: Performed by: OBSTETRICS & GYNECOLOGY

## 2022-05-25 NOTE — PROGRESS NOTES
Please refer to the [facility] ultrasound report in Ob Procedures for additional information regarding today's visit

## 2022-05-25 NOTE — PROGRESS NOTES
Philip Valle is a 23y o  year old  at 22w2d for routine prenatal visit    + FM, no vaginal bleeding, contractions, or LOF  Complaints: No   Most recent ultrasound and labs reviewed    Level 2 scheduled

## 2022-05-25 NOTE — PROGRESS NOTES
[address] [facility] [address] is a 23y o  year old  at 22w2d for routine prenatal visit    + FM, no vaginal bleeding, contractions, or LOF  Complaints: No   Most recent ultrasound and labs reviewed    Has level 2 scheduled tomorrow   Normal MSAFP

## 2022-05-26 ENCOUNTER — ROUTINE PRENATAL (OUTPATIENT)
Dept: PERINATAL CARE | Facility: OTHER | Age: 27
End: 2022-05-26
Payer: COMMERCIAL

## 2022-05-26 VITALS
SYSTOLIC BLOOD PRESSURE: 119 MMHG | BODY MASS INDEX: 24.16 KG/M2 | DIASTOLIC BLOOD PRESSURE: 71 MMHG | HEART RATE: 94 BPM | WEIGHT: 178.4 LBS | HEIGHT: 72 IN

## 2022-05-26 DIAGNOSIS — Z36.86 ENCOUNTER FOR ANTENATAL SCREENING FOR CERVICAL LENGTH: ICD-10-CM

## 2022-05-26 DIAGNOSIS — Z36.3 ENCOUNTER FOR ANTENATAL SCREENING FOR MALFORMATIONS: Primary | ICD-10-CM

## 2022-05-26 DIAGNOSIS — Z3A.20 20 WEEKS GESTATION OF PREGNANCY: ICD-10-CM

## 2022-05-26 PROCEDURE — 76805 OB US >/= 14 WKS SNGL FETUS: CPT | Performed by: OBSTETRICS & GYNECOLOGY

## 2022-05-26 PROCEDURE — 76817 TRANSVAGINAL US OBSTETRIC: CPT | Performed by: OBSTETRICS & GYNECOLOGY

## 2022-05-26 PROCEDURE — 99213 OFFICE O/P EST LOW 20 MIN: CPT | Performed by: OBSTETRICS & GYNECOLOGY

## 2022-05-26 PROCEDURE — 3008F BODY MASS INDEX DOCD: CPT | Performed by: OBSTETRICS & GYNECOLOGY

## 2022-05-26 NOTE — LETTER
May 26, 2022     GABRIEL Cardoza  [address]  [address] [place] [address]     Patient: Cher Jason   YOB: 1995   Date of Visit: 5/26/2022       Dear Dr Angela Preston: Thank you for referring Catalina Bernstein to me for evaluation  Below are my notes for this consultation  If you have questions, please do not hesitate to call me  I look forward to following your patient along with you  Sincerely,        Saran Rowland MD        CC: No Recipients  Saran Rowland MD  5/25/2022  5:44 PM  Sign when Signing Visit  Please refer to the [facility] ultrasound report in Ob Procedures for additional information regarding today's visit

## 2022-05-26 NOTE — PROGRESS NOTES
Ultrasound Probe Disinfection    A transvaginal ultrasound was performed  Prior to use, disinfection was performed with High Level Disinfection Process (Trophon)  Probe serial number F3: [id number] was used        Luther Kirkland  05/26/22  1:04 PM

## 2022-06-27 ENCOUNTER — ROUTINE PRENATAL (OUTPATIENT)
Dept: OBGYN CLINIC | Facility: MEDICAL CENTER | Age: 27
End: 2022-06-27

## 2022-06-27 VITALS — WEIGHT: 180 LBS | BODY MASS INDEX: 24.41 KG/M2 | DIASTOLIC BLOOD PRESSURE: 70 MMHG | SYSTOLIC BLOOD PRESSURE: 100 MMHG

## 2022-06-27 DIAGNOSIS — R39.9 UTI SYMPTOMS: ICD-10-CM

## 2022-06-27 DIAGNOSIS — Z3A.25 25 WEEKS GESTATION OF PREGNANCY: Primary | ICD-10-CM

## 2022-06-27 PROCEDURE — 87086 URINE CULTURE/COLONY COUNT: CPT | Performed by: STUDENT IN AN ORGANIZED HEALTH CARE EDUCATION/TRAINING PROGRAM

## 2022-06-27 PROCEDURE — PNV: Performed by: STUDENT IN AN ORGANIZED HEALTH CARE EDUCATION/TRAINING PROGRAM

## 2022-06-27 NOTE — PROGRESS NOTES
Routine Prenatal Visit  [facility] [address] [place] [address]    Assessment/Plan:  Rody Bravo is a 23y o  year old  at 25w2d who presents for routine prenatal visit  1  25 weeks gestation of pregnancy  Assessment & Plan:  - Reviewed Level 2 US  - Reviewed instructions for 28 week labs      2  UTI symptoms  -     Urine culture        Subjective:     CC: Prenatal care    Howard Silva is a 23 y o   female who presents for routine prenatal care at 25w2d  Pregnancy ROS: Denies leakage of fluid, pelvic pain, or vaginal bleeding  Denies fetal movement  Reporting urinary frequency    The following portions of the patient's history were reviewed and updated as appropriate: allergies, current medications, past family history, past medical history, obstetric history, gynecologic history, past social history, past surgical history and problem list       Objective:  /70   Wt 81 6 kg (180 lb)   LMP 2021   BMI 24 41 kg/m²   Pregravid Weight/BMI: 78 5 kg (173 lb) (BMI 23 46)  Current Weight: 81 6 kg (180 lb)   Total Weight Gain: 3 175 kg (7 lb)   Pre-Davon Vitals    Flowsheet Row Most Recent Value   Prenatal Assessment    Fetal Heart Rate 150   Movement Present   Prenatal Vitals    Blood Pressure 100/70   Weight - Scale 81 6 kg (180 lb)   Urine Albumin/Glucose    Dilation/Effacement/Station    Vaginal Drainage    Edema            General: Well appearing, no distress  Respiratory: Unlabored breathing  Cardiovascular: Regular rate  Abdomen: Soft, gravid, nontender  Fundal Height: Appropriate for gestational age  Extremities: Warm and well perfused  Non tender      Kobe Batres MD  [address] [place] [address]  2022 11:16 PM

## 2022-06-28 LAB — BACTERIA UR CULT: NORMAL

## 2022-07-18 ENCOUNTER — TELEPHONE (OUTPATIENT)
Dept: OBGYN CLINIC | Facility: MEDICAL CENTER | Age: 27
End: 2022-07-18

## 2022-07-18 ENCOUNTER — HOSPITAL ENCOUNTER (INPATIENT)
Facility: HOSPITAL | Age: 27
LOS: 1 days | DRG: 833 | End: 2022-07-18
Attending: OBSTETRICS & GYNECOLOGY | Admitting: OBSTETRICS & GYNECOLOGY
Payer: COMMERCIAL

## 2022-07-18 ENCOUNTER — HOSPITAL ENCOUNTER (INPATIENT)
Facility: HOSPITAL | Age: 27
LOS: 14 days | Discharge: HOME | End: 2022-08-01
Attending: OBSTETRICS & GYNECOLOGY | Admitting: OBSTETRICS & GYNECOLOGY
Payer: COMMERCIAL

## 2022-07-18 VITALS
DIASTOLIC BLOOD PRESSURE: 72 MMHG | RESPIRATION RATE: 17 BRPM | HEART RATE: 89 BPM | TEMPERATURE: 98.3 F | SYSTOLIC BLOOD PRESSURE: 119 MMHG | OXYGEN SATURATION: 98 %

## 2022-07-18 DIAGNOSIS — Z87.59 HISTORY OF PRETERM PREMATURE RUPTURE OF MEMBRANES (PPROM): ICD-10-CM

## 2022-07-18 DIAGNOSIS — Z3A.25 25 WEEKS GESTATION OF PREGNANCY: Primary | ICD-10-CM

## 2022-07-18 DIAGNOSIS — F41.9 ANXIETY: ICD-10-CM

## 2022-07-18 DIAGNOSIS — O42.90 PREMATURE RUPTURE OF MEMBRANES: ICD-10-CM

## 2022-07-18 DIAGNOSIS — Z3A.25 25 WEEKS GESTATION OF PREGNANCY: ICD-10-CM

## 2022-07-18 DIAGNOSIS — Z3A.28 28 WEEKS GESTATION OF PREGNANCY: Primary | ICD-10-CM

## 2022-07-18 LAB
ABO GROUP BLD: NORMAL
ABO GROUP BLD: NORMAL
ALBUMIN SERPL BCP-MCNC: 2.8 G/DL (ref 3.5–5)
ALBUMIN SERPL BCP-MCNC: 3.2 G/DL (ref 3.5–5)
ALP SERPL-CCNC: 110 U/L (ref 46–116)
ALP SERPL-CCNC: 96 U/L (ref 34–104)
ALT SERPL W P-5'-P-CCNC: 11 U/L (ref 7–52)
ALT SERPL W P-5'-P-CCNC: 20 U/L (ref 12–78)
ANION GAP SERPL CALCULATED.3IONS-SCNC: 10 MMOL/L (ref 4–13)
ANION GAP SERPL CALCULATED.3IONS-SCNC: 11 MMOL/L (ref 4–13)
AST SERPL W P-5'-P-CCNC: 14 U/L (ref 13–39)
AST SERPL W P-5'-P-CCNC: 14 U/L (ref 5–45)
BILIRUB SERPL-MCNC: 0.51 MG/DL (ref 0.2–1)
BILIRUB SERPL-MCNC: 0.77 MG/DL (ref 0.2–1)
BLD GP AB SCN SERPL QL: NEGATIVE
BLD GP AB SCN SERPL QL: NEGATIVE
BUN SERPL-MCNC: 5 MG/DL (ref 5–25)
BUN SERPL-MCNC: 5 MG/DL (ref 5–25)
CALCIUM ALBUM COR SERPL-MCNC: 7.8 MG/DL (ref 8.3–10.1)
CALCIUM ALBUM COR SERPL-MCNC: 9.3 MG/DL (ref 8.3–10.1)
CALCIUM SERPL-MCNC: 7.2 MG/DL (ref 8.4–10.2)
CALCIUM SERPL-MCNC: 8.3 MG/DL (ref 8.3–10.1)
CHLORIDE SERPL-SCNC: 103 MMOL/L (ref 96–108)
CHLORIDE SERPL-SCNC: 104 MMOL/L (ref 96–108)
CO2 SERPL-SCNC: 20 MMOL/L (ref 21–32)
CO2 SERPL-SCNC: 23 MMOL/L (ref 21–32)
CREAT SERPL-MCNC: 0.51 MG/DL (ref 0.6–1.3)
CREAT SERPL-MCNC: 0.57 MG/DL (ref 0.6–1.3)
ERYTHROCYTE [DISTWIDTH] IN BLOOD BY AUTOMATED COUNT: 12.8 % (ref 11.6–15.1)
ERYTHROCYTE [DISTWIDTH] IN BLOOD BY AUTOMATED COUNT: 12.9 % (ref 11.6–15.1)
GFR SERPL CREATININE-BSD FRML MDRD: 129 ML/MIN/1.73SQ M
GFR SERPL CREATININE-BSD FRML MDRD: 134 ML/MIN/1.73SQ M
GLUCOSE SERPL-MCNC: 189 MG/DL (ref 65–140)
GLUCOSE SERPL-MCNC: 82 MG/DL (ref 65–140)
HCT VFR BLD AUTO: 31.6 % (ref 34.8–46.1)
HCT VFR BLD AUTO: 33.2 % (ref 34.8–46.1)
HGB BLD-MCNC: 10.7 G/DL (ref 11.5–15.4)
HGB BLD-MCNC: 11.3 G/DL (ref 11.5–15.4)
MCH RBC QN AUTO: 31.7 PG (ref 26.8–34.3)
MCH RBC QN AUTO: 31.7 PG (ref 26.8–34.3)
MCHC RBC AUTO-ENTMCNC: 33.9 G/DL (ref 31.4–37.4)
MCHC RBC AUTO-ENTMCNC: 34 G/DL (ref 31.4–37.4)
MCV RBC AUTO: 93 FL (ref 82–98)
MCV RBC AUTO: 94 FL (ref 82–98)
PLATELET # BLD AUTO: 222 THOUSANDS/UL (ref 149–390)
PLATELET # BLD AUTO: 244 THOUSANDS/UL (ref 149–390)
PMV BLD AUTO: 10.1 FL (ref 8.9–12.7)
PMV BLD AUTO: 10.2 FL (ref 8.9–12.7)
POTASSIUM SERPL-SCNC: 3.5 MMOL/L (ref 3.5–5.3)
POTASSIUM SERPL-SCNC: 3.9 MMOL/L (ref 3.5–5.3)
PROT SERPL-MCNC: 6.1 G/DL (ref 6.4–8.4)
PROT SERPL-MCNC: 6.7 G/DL (ref 6.4–8.4)
RBC # BLD AUTO: 3.38 MILLION/UL (ref 3.81–5.12)
RBC # BLD AUTO: 3.56 MILLION/UL (ref 3.81–5.12)
RH BLD: POSITIVE
RH BLD: POSITIVE
SODIUM SERPL-SCNC: 133 MMOL/L (ref 135–147)
SODIUM SERPL-SCNC: 138 MMOL/L (ref 135–147)
SPECIMEN EXPIRATION DATE: NORMAL
SPECIMEN EXPIRATION DATE: NORMAL
WBC # BLD AUTO: 16.48 THOUSAND/UL (ref 4.31–10.16)
WBC # BLD AUTO: 17.72 THOUSAND/UL (ref 4.31–10.16)

## 2022-07-18 PROCEDURE — 86901 BLOOD TYPING SEROLOGIC RH(D): CPT

## 2022-07-18 PROCEDURE — 85027 COMPLETE CBC AUTOMATED: CPT

## 2022-07-18 PROCEDURE — 87150 DNA/RNA AMPLIFIED PROBE: CPT

## 2022-07-18 PROCEDURE — 99232 SBSQ HOSP IP/OBS MODERATE 35: CPT | Performed by: STUDENT IN AN ORGANIZED HEALTH CARE EDUCATION/TRAINING PROGRAM

## 2022-07-18 PROCEDURE — 99214 OFFICE O/P EST MOD 30 MIN: CPT

## 2022-07-18 PROCEDURE — 80053 COMPREHEN METABOLIC PANEL: CPT | Performed by: OBSTETRICS & GYNECOLOGY

## 2022-07-18 PROCEDURE — 86900 BLOOD TYPING SEROLOGIC ABO: CPT

## 2022-07-18 PROCEDURE — 87591 N.GONORRHOEAE DNA AMP PROB: CPT

## 2022-07-18 PROCEDURE — 86850 RBC ANTIBODY SCREEN: CPT

## 2022-07-18 PROCEDURE — 85027 COMPLETE CBC AUTOMATED: CPT | Performed by: OBSTETRICS & GYNECOLOGY

## 2022-07-18 PROCEDURE — 87491 CHLMYD TRACH DNA AMP PROBE: CPT

## 2022-07-18 PROCEDURE — 86901 BLOOD TYPING SEROLOGIC RH(D): CPT | Performed by: OBSTETRICS & GYNECOLOGY

## 2022-07-18 PROCEDURE — 86592 SYPHILIS TEST NON-TREP QUAL: CPT

## 2022-07-18 PROCEDURE — 99221 1ST HOSP IP/OBS SF/LOW 40: CPT | Performed by: OBSTETRICS & GYNECOLOGY

## 2022-07-18 PROCEDURE — 4A1HXCZ MONITORING OF PRODUCTS OF CONCEPTION, CARDIAC RATE, EXTERNAL APPROACH: ICD-10-PCS | Performed by: OBSTETRICS & GYNECOLOGY

## 2022-07-18 PROCEDURE — 76817 TRANSVAGINAL US OBSTETRIC: CPT

## 2022-07-18 PROCEDURE — 99204 OFFICE O/P NEW MOD 45 MIN: CPT

## 2022-07-18 PROCEDURE — NC001 PR NO CHARGE: Performed by: OBSTETRICS & GYNECOLOGY

## 2022-07-18 PROCEDURE — 80053 COMPREHEN METABOLIC PANEL: CPT

## 2022-07-18 PROCEDURE — 76817 TRANSVAGINAL US OBSTETRIC: CPT | Performed by: OBSTETRICS & GYNECOLOGY

## 2022-07-18 PROCEDURE — 86850 RBC ANTIBODY SCREEN: CPT | Performed by: OBSTETRICS & GYNECOLOGY

## 2022-07-18 PROCEDURE — 86900 BLOOD TYPING SEROLOGIC ABO: CPT | Performed by: OBSTETRICS & GYNECOLOGY

## 2022-07-18 RX ORDER — SODIUM CHLORIDE, SODIUM LACTATE, POTASSIUM CHLORIDE, CALCIUM CHLORIDE 600; 310; 30; 20 MG/100ML; MG/100ML; MG/100ML; MG/100ML
50 INJECTION, SOLUTION INTRAVENOUS CONTINUOUS
Status: CANCELLED | OUTPATIENT
Start: 2022-07-18

## 2022-07-18 RX ORDER — BETAMETHASONE SODIUM PHOSPHATE AND BETAMETHASONE ACETATE 3; 3 MG/ML; MG/ML
12 INJECTION, SUSPENSION INTRA-ARTICULAR; INTRALESIONAL; INTRAMUSCULAR; SOFT TISSUE ONCE
Status: COMPLETED | OUTPATIENT
Start: 2022-07-19 | End: 2022-07-19

## 2022-07-18 RX ORDER — SODIUM CHLORIDE, SODIUM LACTATE, POTASSIUM CHLORIDE, CALCIUM CHLORIDE 600; 310; 30; 20 MG/100ML; MG/100ML; MG/100ML; MG/100ML
50 INJECTION, SOLUTION INTRAVENOUS CONTINUOUS
Status: DISCONTINUED | OUTPATIENT
Start: 2022-07-18 | End: 2022-07-18 | Stop reason: HOSPADM

## 2022-07-18 RX ORDER — SODIUM CHLORIDE, SODIUM LACTATE, POTASSIUM CHLORIDE, CALCIUM CHLORIDE 600; 310; 30; 20 MG/100ML; MG/100ML; MG/100ML; MG/100ML
50 INJECTION, SOLUTION INTRAVENOUS CONTINUOUS
Status: DISCONTINUED | OUTPATIENT
Start: 2022-07-18 | End: 2022-07-19

## 2022-07-18 RX ORDER — AZITHROMYCIN 250 MG/1
1000 TABLET, FILM COATED ORAL ONCE
Status: COMPLETED | OUTPATIENT
Start: 2022-07-18 | End: 2022-07-18

## 2022-07-18 RX ORDER — MAGNESIUM SULFATE HEPTAHYDRATE 40 MG/ML
2 INJECTION, SOLUTION INTRAVENOUS ONCE
Status: COMPLETED | OUTPATIENT
Start: 2022-07-18 | End: 2022-07-18

## 2022-07-18 RX ORDER — ONDANSETRON 2 MG/ML
4 INJECTION INTRAMUSCULAR; INTRAVENOUS ONCE
Status: COMPLETED | OUTPATIENT
Start: 2022-07-18 | End: 2022-07-18

## 2022-07-18 RX ORDER — MAGNESIUM SULFATE HEPTAHYDRATE 40 MG/ML
2 INJECTION, SOLUTION INTRAVENOUS CONTINUOUS
Status: DISCONTINUED | OUTPATIENT
Start: 2022-07-18 | End: 2022-07-19

## 2022-07-18 RX ORDER — AMOXICILLIN 250 MG/1
250 CAPSULE ORAL EVERY 8 HOURS SCHEDULED
Status: DISCONTINUED | OUTPATIENT
Start: 2022-07-20 | End: 2022-07-20

## 2022-07-18 RX ORDER — MAGNESIUM SULFATE HEPTAHYDRATE 40 MG/ML
2 INJECTION, SOLUTION INTRAVENOUS CONTINUOUS
Status: DISCONTINUED | OUTPATIENT
Start: 2022-07-18 | End: 2022-07-18 | Stop reason: HOSPADM

## 2022-07-18 RX ORDER — BETAMETHASONE SODIUM PHOSPHATE AND BETAMETHASONE ACETATE 3; 3 MG/ML; MG/ML
12 INJECTION, SUSPENSION INTRA-ARTICULAR; INTRALESIONAL; INTRAMUSCULAR; SOFT TISSUE EVERY 24 HOURS
Status: DISCONTINUED | OUTPATIENT
Start: 2022-07-18 | End: 2022-07-18 | Stop reason: HOSPADM

## 2022-07-18 RX ORDER — MAGNESIUM SULFATE HEPTAHYDRATE 40 MG/ML
4 INJECTION, SOLUTION INTRAVENOUS ONCE
Status: COMPLETED | OUTPATIENT
Start: 2022-07-18 | End: 2022-07-18

## 2022-07-18 RX ADMIN — MAGNESIUM SULFATE HEPTAHYDRATE 4 G: 40 INJECTION, SOLUTION INTRAVENOUS at 14:01

## 2022-07-18 RX ADMIN — SODIUM CHLORIDE, POTASSIUM CHLORIDE, SODIUM LACTATE AND CALCIUM CHLORIDE 50 ML/HR: 600; 310; 30; 20 INJECTION, SOLUTION INTRAVENOUS at 17:00

## 2022-07-18 RX ADMIN — ONDANSETRON 4 MG: 2 INJECTION INTRAMUSCULAR; INTRAVENOUS at 14:49

## 2022-07-18 RX ADMIN — MAGNESIUM SULFATE HEPTAHYDRATE 2 G/HR: 40 INJECTION, SOLUTION INTRAVENOUS at 23:57

## 2022-07-18 RX ADMIN — MAGNESIUM SULFATE HEPTAHYDRATE 2 G/HR: 40 INJECTION, SOLUTION INTRAVENOUS at 18:45

## 2022-07-18 RX ADMIN — AMPICILLIN SODIUM 2000 MG: 2 INJECTION, POWDER, FOR SOLUTION INTRAMUSCULAR; INTRAVENOUS at 18:45

## 2022-07-18 RX ADMIN — BETAMETHASONE SODIUM PHOSPHATE AND BETAMETHASONE ACETATE 12 MG: 3; 3 INJECTION, SUSPENSION INTRA-ARTICULAR; INTRALESIONAL; INTRAMUSCULAR at 14:06

## 2022-07-18 RX ADMIN — MAGNESIUM SULFATE HEPTAHYDRATE 2 G/HR: 40 INJECTION, SOLUTION INTRAVENOUS at 14:42

## 2022-07-18 RX ADMIN — PRENATAL VIT W/ FE FUMARATE-FA TAB 27-0.8 MG 1 TABLET: 27-0.8 TAB at 14:10

## 2022-07-18 RX ADMIN — AZITHROMYCIN MONOHYDRATE 1000 MG: 250 TABLET ORAL at 14:10

## 2022-07-18 RX ADMIN — SODIUM CHLORIDE, POTASSIUM CHLORIDE, SODIUM LACTATE AND CALCIUM CHLORIDE 50 ML/HR: 600; 310; 30; 20 INJECTION, SOLUTION INTRAVENOUS at 14:05

## 2022-07-18 RX ADMIN — SODIUM CHLORIDE 5 MILLION UNITS: 0.9 INJECTION, SOLUTION INTRAVENOUS at 14:05

## 2022-07-18 RX ADMIN — MAGNESIUM SULFATE HEPTAHYDRATE 2 G: 40 INJECTION, SOLUTION INTRAVENOUS at 14:23

## 2022-07-18 NOTE — ASSESSMENT & PLAN NOTE
1  Tdap done 7/19/22  GBS negative  Daily prenatal vitamins  2  Status post betamethasone for fetal lung maturation  Diabetes screening ordered for 1 week after completing Betamethasone (7/26)  3  Status post Neonatology consult  4  Status post magnesium for fetal neuroprotection  5  Last growth scan on 7/19 with estimated fetal weight of 1378 g (72%)  6   NST 3 times daily  7  Reassess fetal presentation as needed

## 2022-07-18 NOTE — H&P
H & P- Obstetrics   Maryan Olivares 23 y o  female MRN: [id number]  Unit/Bed#: L&D 329-01 Encounter: [id number]    Assessment: 23 y o  Mike Aguirre at 28w2d admitted for prelabor  premature rupture of membranes  SVE: closed visually on sterile speculum exam  FHT: Baseline :135, Moderate variability, Accelerations present (10X10), Decelerations not present  Slides:  Nitrazine Positive               Ferning positive               Pooling positive             Presentation: Complete Breech confirmed by Abdominal US  GBS status: Unknown (collected on 2022)       Plan:   * History of  premature rupture of membranes (PPROM)  Assessment & Plan  Admit  Penicillin for GBS prophylaxis (GBS collected, f/u results)  Azithromycin for latency antibiotics  Betamethasone for fetal lung maturity   Magnesium for neuroprotection  F/u CBC, CMP and RPR  FHT & [place] monitoring  Neonatology consulted  MFM consulted  Plan for [facility] for higher NICU level of care      Anxiety  Assessment & Plan  Monitor mood  No medication at this time        Discussed case and plan w/ Dr Génesis Galarza      Chief Complaint: leaking fluid    HPI: Maryan Olivares is a 23 y o  Mike Aguirre with an GUIDO of 10/8/2022, by Ultrasound at 28w2d who is being admitted for PPROM  She presented to triage complaining of leakage of fluid since 7:30 AM today that soaked through her underwear and shorts  She reports that upon wiping she notices "pink" colored discharge  She reports that the fluid is odorless  She denies having uterine contractions  She states she has felt good fetal movement  She denies fever, nausea and vomiting  She denies changes  frequency, urgency and burning sensation during urination  She reports that she had sexual intercourse yesterday and is concerned that it might have caused the symptoms  She endorses headaches in the past week  She also reports experiencing anxiety for the past week         Patient Active Problem List   Diagnosis    Mass of right axilla    Atypical nevi    28 weeks gestation of pregnancy    Prenatal care in first trimester    COVID-19 vaccination declined    Family history of deep vein thrombosis    Nausea and vomiting in pregnancy    History of  premature rupture of membranes (PPROM)    Anxiety       Baby complications/comments: none    Review of Systems   Constitutional: Negative for fever  HENT: Negative for rhinorrhea and sore throat  Respiratory: Negative for cough, chest tightness and shortness of breath  Cardiovascular: Negative for chest pain and palpitations  Gastrointestinal: Negative for abdominal pain, nausea and vomiting  Genitourinary: Positive for vaginal discharge  Negative for dysuria and vaginal pain  Skin: Negative  Neurological: Negative for dizziness and headaches  Psychiatric/Behavioral: Negative for behavioral problems and confusion         OB Hx:  OB History    Para Term  AB Living   1 0 0 0 0 0   SAB IAB Ectopic Multiple Live Births   0 0 0 0 0      # Outcome Date GA Lbr Clinton/2nd Weight Sex Delivery Anes PTL Lv   1 Current                Past Medical Hx:  Past Medical History:   Diagnosis Date    Common wart     Resolved 2016     Ganglion cyst     Resolved 2016     Migraine with aura     Resolved 2016     Varicella     vaccine       Past Surgical hx:  Past Surgical History:   Procedure Laterality Date    DENTAL SURGERY         Social Hx:  Alcohol use: No  Tobacco use: No   Other substance use: No    Other: None    Allergies   Allergen Reactions    Clindamycin Throat Swelling    Sumatriptan Nausea Only and Throat Swelling    Azithromycin GI Intolerance and Vomiting    Penicillins Rash       Medications Prior to Admission   Medication    ondansetron (Zofran ODT) 4 mg disintegrating tablet    Prenatal Vit-Fe Fumarate-FA (PRENATAL VITAMINS PO)       Objective:  Temp:  [97 9 °F (36 6 °C)-98 2 °F (36 8 °C)] 98 °F (36 7 °C)  HR:  [] 96  Resp:  [16-17] 16  BP: (109-110)/(72-75) 110/75  There is no height or weight on file to calculate BMI  Physical Exam:  Physical Exam  Constitutional:       Appearance: Normal appearance  Genitourinary:      Vulva normal    HENT:      Head: Normocephalic and atraumatic  Eyes:      Extraocular Movements: Extraocular movements intact  Cardiovascular:      Rate and Rhythm: Normal rate and regular rhythm  Pulmonary:      Effort: Pulmonary effort is normal       Breath sounds: Normal breath sounds  Abdominal:      Comments: Gravid, non-tender upon palpation   Musculoskeletal:         General: No tenderness  Cervical back: Normal range of motion  Neurological:      General: No focal deficit present  Mental Status: She is alert and oriented to person, place, and time  Skin:     General: Skin is warm and dry     Psychiatric:         Mood and Affect: Mood normal          Behavior: Behavior normal             FHT:  Baseline Rate: 145 bpm  Variability: Moderate 6-25 bpm  Accelerations: 15 x 15 or greater  Decelerations: None    TOCO:   Contraction Frequency (minutes): none  Contraction Duration (seconds): n/a  Contraction Quality: Not applicable    Lab Results   Component Value Date    WBC 16 48 (H) 07/18/2022    HGB 11 3 (L) 07/18/2022    HCT 33 2 (L) 07/18/2022     07/18/2022     Lab Results   Component Value Date    K 3 5 07/18/2022     07/18/2022    CO2 23 07/18/2022    BUN 5 07/18/2022    CREATININE 0 57 (L) 07/18/2022    AST 14 07/18/2022    ALT 20 07/18/2022     Prenatal Labs: Reviewed      Blood type: B Positive   Antibody:Unknown  GBS: Unknown, Pending (07/18/2022)  HIV: Negative  Rubella: Immune  VDRL/RPR: Non reactive  HBsAg: Negative  Chlamydia: Unknown, Pending (07/18/2022)  Gonorrhea: Unknown, Pending (07/18/2022)  Diabetes 1 hour screen: Unknown    Platelets: 955  CPA:46 3  >2 Midnights  INPATIENT        Signature/Title: Natalia Stokes MD  Date: 7/18/2022  Time: 2:55 PM

## 2022-07-18 NOTE — CONSULTS
Consultation - Maternal Fetal Medicine   Troy Doing 23 y o  female MRN: [id number]  Unit/Bed#:  TRIAGE  Encounter: [id number]  Admit date: 2022  Today's date: 22    Assessment/Plan   Ms Aurelio Kendrick is a 23y o  year-old  at [facility] Day: 1, admitted for PPROM  By issue:    History of  premature rupture of membranes (PPROM)  Assessment & Plan  Latency antibiotics of 48-hour treatment with IV ampicillin and erythromycin (or azithromycin) followed by 5 days of oral amoxicillin and erythromycin (or azithromycin) recommended to prolong latency if no contraindications (ACOG Level A)  Betamethasone -  Continuous external fetal monitoring overnight  Neonatology consult done at [address] [place] [address], NICU notified at [facility]  Magnesium for neuroprotection      * 28 weeks gestation of pregnancy  Assessment & Plan  Admit to OB/GYN service with MFM consult  Follow up CBC, RPR, Blood Type  Janene Commons breech by transvaginal ultrasound           Chief Complaint   Patient presents with    Rupture of Membranes       Physician Requesting Consult: Joby Stauffer MD  Reason for Consult / Principal Problem: PPROM  Subspeciality: Perinatology    Dear Dr Katherine Melvin,    Thank you for referring patient Troy Estes for Maternal-Fetal Medicine consultation regarding  pre-labor rupture of membranes  HPI: As you know, Ms Aurelio Kendrikc is a 23y o  year-old  with an GUIDO of Estimated Date of Delivery: 10/8/22 at [address] [facility] Day: 1, admitted for PPROM at 28w2d  Her current pregnancy is significant for declined COVID vaccine, anxiety  She is feeling overwhelmed but supported in her current situation  Other obstetric review of symptoms:  Contractions: None  Leakage of fluid: color: clear  Bleeding: None  Fetal movement: present  Pertinent items are noted in HPI    Other history is as follows:    Historical Information   OB History    Para Term  AB Living   1 0 0 0 0 0   SAB IAB Ectopic Multiple Live Births   0 0 0 0 0      # Outcome Date GA Lbr Clinton/2nd Weight Sex Delivery Anes PTL Lv   1 Current              Gynecologic history: Patient's last menstrual period was 12/25/2021  Past Medical History:   Diagnosis Date    Common wart     Resolved 7/29/2016     Ganglion cyst     Resolved 7/29/2016     Migraine with aura     Resolved 12/27/2016     Varicella     vaccine     Past Surgical History:   Procedure Laterality Date    DENTAL SURGERY       Social History   Social History     Substance and Sexual Activity   Alcohol Use Not Currently     Social History     Substance and Sexual Activity   Drug Use No       Meds/Allergies   Prior to Admission Medications   Prescriptions Last Dose Informant Patient Reported? Taking?    Prenatal Vit-Fe Fumarate-FA (PRENATAL VITAMINS PO) 7/18/2022 at Unknown time Self Yes Yes   Sig: Take by mouth   ondansetron (Zofran ODT) 4 mg disintegrating tablet  Self No No   Sig: Take 1 tablet (4 mg total) by mouth every 6 (six) hours as needed for nausea or vomiting   Patient not taking: No sig reported      Facility-Administered Medications: None       Current Facility-Administered Medications   Medication Dose Route Frequency    [START ON 7/20/2022] amoxicillin (AMOXIL) capsule 250 mg  250 mg Oral Q8H [address]    ampicillin (OMNIPEN) 2,000 mg in sodium chloride 0 9 % 100 mL IVPB  2,000 mg Intravenous Q6H    lactated ringers infusion  50 mL/hr Intravenous Continuous    [START ON 7/19/2022] prenatal multivitamin tablet 1 tablet  1 tablet Oral Daily     Allergies   Allergen Reactions    Clindamycin Throat Swelling    Sumatriptan Nausea Only and Throat Swelling    Azithromycin GI Intolerance and Vomiting    Penicillins Rash       Objective    Patient Vitals for the past 24 hrs:   BP Temp Temp src Pulse Resp Height Weight   07/18/22 1651 114/68 98 4 °F (36 9 °C) Oral 101 18 6' (1 829 m) 81 6 kg (180 lb)     Vitals: Blood pressure 114/68, pulse 101, temperature 98 4 °F (36 9 °C), temperature source Oral, resp  rate 18, height 6' (1 829 m), weight 81 6 kg (180 lb), last menstrual period 12/25/2021, not currently breastfeeding  Body mass index is 24 41 kg/m²  General: Well appearing, no distress  Respiratory: Unlabored breathing  Cardiovascular: Regular rate  Abdomen: Soft, gravid, nontender  Fundal Height: Appropriate for gestational age  Extremities: Warm and well perfused  Non tender        Results from last 7 days   Lab Units 07/18/22  1350   WBC Thousand/uL 16 48*   HEMOGLOBIN g/dL 11 3*   MCV fL 93   PLATELETS Thousands/uL 222         Results from last 7 days   Lab Units 07/18/22  1350   POTASSIUM mmol/L 3 5   CHLORIDE mmol/L 104   CO2 mmol/L 23   BUN mg/dL 5   CREATININE mg/dL 0 57*   EGFR ml/min/1 73sq m 129     Results from last 7 days   Lab Units 07/18/22  1350   AST U/L 14   ALT U/L 20   ALK PHOS U/L 110     Results from last 7 days   Lab Units 07/18/22  1350   PLATELETS Thousands/uL 222     Fetal data:  Nonstress test: date 07/18/22   Baseline: 135 BPM  Variability: moderate  Accelerations: present, 10x10  Decelerations: absent  Contractions: absent  Assessment: reactive  Plan: continue TID and PRN      Steph Valle DO  7/18/2022  5:48 PM

## 2022-07-18 NOTE — CONSULTS
Inpatient consult to Neonatology  Consult performed by: Kyrie Borges ordered by: Dr Rajiv Lu     Reason for Consult:  PPROM at 29 + 2 weeks EGA  Inpatient prenatal consultation conducted today  HPI: Eliazar Gomez is a 23y o  year old  female at 28w2d   She was admitted to L&D today for PPROM  Prenatal labs include Type B+, Antibody negative, RPR (NR), HepB (neg), Rubella Immune, HIV (neg), GBS (unknown)  Farzaneh Triana confirmed by Abdominal US  Mother is not in active labor, and was started on a 2 dose course of Betamethasone, as well as on Magnesium sulfate for neuroprotection  She is being managed expectantly, with a planned transfer to [facility] for further care, due to extreme prematurity  Discussed that neonatologist is available in-house at both [facility] and [facility]  and would participate in the management of her infant  Since infant is expected to deliver prematurely, a neonatologist would be at the delivery, and if infant were to deliver before 27 weeks EGA, the infant would need to be admitted to NICU  Even at 35 weeks, the infant would be at risk for problems associated with late  delivery as indicated below  Discussed delivery room and  management of  infants, including assessment and typical management of cardiorespiratory, infectious, nutritional, and thermal needs  Reassured mother that severe complications become less likely with each passing week  Mentioned that overall length of stay varies, but that infants are often ready for discharge by about one month prior to the due date, and that family will have ample warning as discharge readiness approaches  Parents questions answered  Counseling / Coordination of Care  I spent 20 minutes in discussion with parents and 10 minutes charting  Greater than 50% of total time was spent with the patient and / or family counseling and / or coordination of care     Lona Heath, MD  Neonatology

## 2022-07-18 NOTE — PLAN OF CARE
Problem: ANTEPARTUM  Goal: Maintain pregnancy as long as maternal and/or fetal condition is stable  Description: INTERVENTIONS:  - Maternal surveillance  - Fetal surveillance  - Monitor uterine activity  - Medications as ordered  - Bedrest  Outcome: Progressing     Problem: PAIN - ADULT  Goal: Verbalizes/displays adequate comfort level or baseline comfort level  Description: Interventions:  - Encourage patient to monitor pain and request assistance  - Assess pain using appropriate pain scale  - Administer analgesics based on type and severity of pain and evaluate response  - Implement non-pharmacological measures as appropriate and evaluate response  - Consider cultural and social influences on pain and pain management  - Notify physician/advanced practitioner if interventions unsuccessful or patient reports new pain  Outcome: Progressing     Problem: INFECTION - ADULT  Goal: Absence or prevention of progression during hospitalization  Description: INTERVENTIONS:  - Assess and monitor for signs and symptoms of infection  - Monitor lab/diagnostic results  - Monitor all insertion sites, i e  indwelling lines, tubes, and drains  - Monitor endotracheal if appropriate and nasal secretions for changes in amount and color  - [place] appropriate cooling/warming therapies per order  - Administer medications as ordered  - Instruct and encourage patient and family to use good hand hygiene technique  - Identify and instruct in appropriate isolation precautions for identified infection/condition  Outcome: Progressing  Goal: Absence of fever/infection during neutropenic period  Description: INTERVENTIONS:  - Monitor WBC    Outcome: Progressing     Problem: SAFETY ADULT  Goal: Patient will remain free of falls  Description: INTERVENTIONS:  - Educate patient/family on patient safety including physical limitations  - Instruct patient to call for assistance with activity   - Consult OT/PT to assist with strengthening/mobility   - Keep Call bell within reach  - Keep bed low and locked with side rails adjusted as appropriate  - Keep care items and personal belongings within reach  - Initiate and maintain comfort rounds  - Make Fall Risk Sign visible to staff  - Offer Toileting every 2-3 Hours, in advance of need      - Apply yellow socks and bracelet for high fall risk patients  - Consider moving patient to room near nurses station  Outcome: Progressing  Goal: Maintain or return to baseline ADL function  Description: INTERVENTIONS:  -  Assess patient's ability to carry out ADLs; assess patient's baseline for ADL function and identify physical deficits which impact ability to perform ADLs (bathing, care of mouth/teeth, toileting, grooming, dressing, etc )  - Assess/evaluate cause of self-care deficits   - Assess range of motion  - Assess patient's mobility; develop plan if impaired  - Assess patient's need for assistive devices and provide as appropriate  - Encourage maximum independence but intervene and supervise when necessary  - Involve family in performance of ADLs  - Assess for home care needs following discharge   - Consider OT consult to assist with ADL evaluation and planning for discharge  - Provide patient education as appropriate  Outcome: Progressing  Goal: Maintains/Returns to pre admission functional level  Description: INTERVENTIONS:  - Perform BMAT or MOVE assessment daily    - Set and communicate daily mobility goal to care team and patient/family/caregiver  - Collaborate with rehabilitation services on mobility goals if consulted  - Record patient progress and toleration of activity level   Outcome: Progressing     Problem: Knowledge Deficit  Goal: Patient/family/caregiver demonstrates understanding of disease process, treatment plan, medications, and discharge instructions  Description: Complete learning assessment and assess knowledge base    Interventions:  - Provide teaching at level of understanding  - Provide teaching via preferred learning methods  Outcome: Progressing     Problem: DISCHARGE PLANNING  Goal: Discharge to home or other facility with appropriate resources  Description: INTERVENTIONS:  - Identify barriers to discharge w/patient and caregiver  - Arrange for needed discharge resources and transportation as appropriate  - Identify discharge learning needs (meds, wound care, etc )  - Arrange for interpretive services to assist at discharge as needed  - Refer to Case Management Department for coordinating discharge planning if the patient needs post-hospital services based on physician/advanced practitioner order or complex needs related to functional status, cognitive ability, or social support system  Outcome: Progressing

## 2022-07-18 NOTE — TELEPHONE ENCOUNTER
Surendra Flores called this am with c/o some leaking of fluid with no smell at 7:30 and again at 9:30 this morning  Has some lower back discomfort but states this is usual for her  Was feeling baby this am  Unsure if it is urine at this time  Spoke with provider and patient aware to proceed to [place] l&d for evaluation   Pt agrees

## 2022-07-18 NOTE — PROGRESS NOTES
Dr Christina Alves states that patient will be transferred to [facility]  Dr Christina Alves currently at bedside speaking with patient & her

## 2022-07-18 NOTE — H&P
H & P- Obstetrics   Malcolm Pour 23 y o  female MRN: [id number]  Unit/Bed#: LD TRIAGE  Encounter: [id number]      Assessment/Plan:    23 y o  Nils Vasquez at 28w2d admitted for  premature rupture of membranes     SVE:  Deferred    History of  premature rupture of membranes (PPROM)  Assessment & Plan  Latency antibiotics of 48-hour treatment with IV ampicillin and erythromycin (or azithromycin) followed by 5 days of oral amoxicillin and erythromycin (or azithromycin) recommended to prolong latency if no contraindications (ACOG Level A)  Betamethasone -  Continuous external fetal monitoring overnight  [facility] consult done at [facility], NICU notified at [facility]      * 28 weeks gestation of pregnancy  Assessment & Plan  Admit to OB/GYN service with MFM consult  Follow up CBC, RPR, Blood Type  Sergio breech by transvaginal ultrasound           Patient of: [facility]  This patient will be an INPATIENT  and I certify the anticipated length of stay is >2 Midnights  Discussed with Dr Reddy Srinivas:    Please see MFM consult note for full HPI    Patient Active Problem List   Diagnosis    Mass of right axilla    Atypical nevi    28 weeks gestation of pregnancy    Prenatal care in first trimester    COVID-19 vaccination declined    Family history of deep vein thrombosis    Nausea and vomiting in pregnancy    History of  premature rupture of membranes (PPROM)    Anxiety         Review of Systems    OB History    Para Term  AB Living   1 0 0 0 0 0   SAB IAB Ectopic Multiple Live Births   0 0 0 0 0      # Outcome Date GA Lbr [place]/2nd Weight Sex Delivery Anes PTL Lv   1 Current                Past Medical History:   Diagnosis Date    Common wart     Resolved 2016     Ganglion cyst     Resolved 2016     Migraine with aura     Resolved 2016     Varicella     vaccine       Past Surgical History:   Procedure Laterality Date   Yogi Gipson DENTAL SURGERY Social History     Tobacco Use    Smoking status: Never Smoker    Smokeless tobacco: Never Used   Substance Use Topics    Alcohol use: Not Currently       Allergies   Allergen Reactions    Clindamycin Throat Swelling    Sumatriptan Nausea Only and Throat Swelling    Azithromycin GI Intolerance and Vomiting    Penicillins Rash       Medications Prior to Admission   Medication    Prenatal Vit-Fe Fumarate-FA (PRENATAL VITAMINS PO)    ondansetron (Zofran ODT) 4 mg disintegrating tablet           OBJECTIVE:  Vitals:  Temp:  [97 9 °F (36 6 °C)-98 4 °F (36 9 °C)] 98 4 °F (36 9 °C)  HR:  [] 101  Resp:  [16-18] 18  BP: (109-119)/(68-75) 114/68  Body mass index is 24 41 kg/m²  Physical Exam:    General: Well appearing, no distress  Respiratory: Unlabored breathing  Cardiovascular: Regular rate  Abdomen: Soft, gravid, nontender  Fundal Height: Appropriate for gestational age  Extremities: Warm and well perfused  Non tender        FHT:   Baseline 130, moderate variability, accelerations seen, no dcelerations noted    TOCO: Flat         Prenatal Labs:   Blood Type:   Lab Results   Component Value Date/Time    ABO Grouping B 07/18/2022 01:50 PM     , D (Rh type):   Lab Results   Component Value Date/Time    Rh Factor Positive 07/18/2022 01:50 PM    Rh Type RH(D) POSITIVE 03/08/2022 02:51 PM     , HCT/HGB:   Lab Results   Component Value Date/Time    Hematocrit 33 2 (L) 07/18/2022 01:50 PM    Hematocrit 40 8 06/23/2017 12:00 AM    Hemoglobin 11 3 (L) 07/18/2022 01:50 PM    Hemoglobin 13 8 06/23/2017 12:00 AM      , Platelets:   Lab Results   Component Value Date/Time    Platelets 512 99/72/4292 01:50 PM    Platelets 905 49/61/4947 12:00 AM   VDRL/RPR:   Lab Results   Component Value Date/Time    RPR NON-REACTIVE 03/08/2022 02:51 PM      , Hep B:   Lab Results   Component Value Date/Time    Hepatitis B Surface Ag neg 03/08/2022 12:00 AM     , HIV:   Lab Results   Component Value Date/Time    HIV-1/HIV-2 AB Non-Reactive 03/08/2022 12:00 AM    HIV AG/AB, 4th Gen NON-REACTIVE 03/08/2022 02:51 PM           Shashi Iglesias DO  OB/GYN PGY-3  7/18/2022  6:05 PM        Portions of the record may have been created with voice recognition software  Occasional wrong word or "sound a like" substitutions may have occurred due to the inherent limitations of voice recognition software    Read the chart carefully and recognize, using context, where substitutions have occurred

## 2022-07-18 NOTE — EMTALA/ACUTE CARE TRANSFER
[facility] AND DELIVERY  [address] [facility] [address]  Dept: [phone]      EMTALA TRANSFER CONSENT    NAME Betty Villagomez 4/13/1879                              MRN [id number]    I have been informed of my rights regarding examination, treatment, and transfer   by Dr Rahat Thomas MD    Benefits: Specialized equipment and/or services available at the receiving facility (Include comment)________________________ (NICU level of care)    Risks: Potential for delay in receiving treatment, Increased discomfort during transfer, Possible worsening of condition or death during transfer, Potential deterioration of medical condition, Loss of IV      Consent for Transfer:  I acknowledge that my medical condition has been evaluated and explained to me by the emergency department physician or other qualified medical person and/or my attending physician, who has recommended that I be transferred to the service of  Accepting Physician: Dr Amy Keita at [address] [place] [address] Name, [address] : [facility]  The above potential benefits of such transfer, the potential risks associated with such transfer, and the probable risks of not being transferred have been explained to me, and I fully understand them  The doctor has explained that, in my case, the benefits of transfer outweigh the risks  I agree to be transferred  I authorize the performance of emergency medical procedures and treatments upon me in both transit and upon arrival at the receiving facility  Additionally, I authorize the release of any and all medical records to the receiving facility and request they be transported with me, if possible  I understand that the safest mode of transportation during a medical emergency is an ambulance and that the Hospital advocates the use of this mode of transport   Risks of traveling to the receiving facility by car, including absence of medical control, life sustaining equipment, such as oxygen, and medical personnel has been explained to me and I fully understand them  (MICHEL CORRECT BOX BELOW)  [  ]  I consent to the stated transfer and to be transported by ambulance/helicopter  [  ]  I consent to the stated transfer, but refuse transportation by ambulance and accept full responsibility for my transportation by car  I understand the risks of non-ambulance transfers and I exonerate the Hospital and its staff from any deterioration in my condition that results from this refusal     X___________________________________________    DATE  22  TIME________  Signature of patient or legally responsible individual signing on patient behalf           RELATIONSHIP TO PATIENT_________________________          Provider Certification    NAME Megha Diaz                                         1995                              MRN [id number]    A medical screening exam was performed on the above named patient    Based on the examination:    Condition Necessitating Transfer PPROM    Patient Condition: The patient has been stabilized such that within reasonable medical probability, no material deterioration of the patient condition or the condition of the unborn child(devan) is likely to result from the transfer    Reason for Transfer: Level of Care needed not available at this facility    Transfer Requirements: [address] [facility]   · Space available and qualified personnel available for treatment as acknowledged by    · Agreed to accept transfer and to provide appropriate medical treatment as acknowledged by       Dr Flaquita Mcclellan  · Appropriate medical records of the examination and treatment of the patient are provided at the time of transfer   [address], [address] _______  · Transfer will be performed by qualified personnel from    and appropriate transfer equipment as required, including the use of necessary and appropriate life support measures  Provider Certification: I have examined the patient and explained the following risks and benefits of being transferred/refusing transfer to the patient/family:  General risk, such as traffic hazards, adverse weather conditions, rough terrain or turbulence, possible failure of equipment (including vehicle or aircraft), or consequences of actions of persons outside the control of the transport personnel, Risk of worsening condition, Unanticipated needs of medical equipment and personnel during transport      Based on these reasonable risks and benefits to the patient and/or the unborn child(devan), and based upon the information available at the time of the patients examination, I certify that the medical benefits reasonably to be expected from the provision of appropriate medical treatments at another medical facility outweigh the increasing risks, if any, to the individuals medical condition, and in the case of labor to the unborn child, from effecting the transfer      X____________________________________________ DATE 07/18/22        TIME_______      ORIGINAL - SEND TO MEDICAL RECORDS   COPY - SEND WITH PATIENT DURING TRANSFER

## 2022-07-18 NOTE — PROGRESS NOTES
Patient left unit at this time on stretcher, accompanied by [facility]' and her    [facility] called & report given to Almita Palomo RN via telephone (receiving nurse at [facility])

## 2022-07-18 NOTE — PROCEDURES
Maryellen Matamoros, myriam  at 28w2d with an GUIDO of 10/8/2022, by Ultrasound, was seen at [facility] for the following procedure(s): $Procedure Type: US - Transvaginal]                   Ultrasound Other  Fetal Presentation: Breech (Complete Breech)  Cervical Length: 3 85  Funnel: No  Debris: No         Ultrasound Probe Disinfection    A transvaginal ultrasound was performed     Prior to use, disinfection was performed with High Level Disinfection Process (Trophon)  Probe serial number SLA-LD: [id number] was used    Carolyn Penaloza MD  22  2:47 PM

## 2022-07-18 NOTE — ASSESSMENT & PLAN NOTE
Admit  Penicillin for GBS prophylaxis (GBS collected, f/u results)  Azithromycin for latency antibiotics  Betamethasone for fetal lung maturity   Magnesium for neuroprotection  F/u CBC, CMP and RPR  FHT & [place] monitoring  Neonatology consulted  MFM consulted  Plan for transfer to [facility] [place] for higher NICU level of care

## 2022-07-19 PROBLEM — O21.9 NAUSEA AND VOMITING IN PREGNANCY: Status: RESOLVED | Noted: 2022-04-27 | Resolved: 2022-07-19

## 2022-07-19 PROBLEM — Z34.91 PRENATAL CARE IN FIRST TRIMESTER: Status: RESOLVED | Noted: 2022-03-28 | Resolved: 2022-07-19

## 2022-07-19 PROBLEM — Z28.21 COVID-19 VACCINATION DECLINED: Status: RESOLVED | Noted: 2022-03-28 | Resolved: 2022-07-19

## 2022-07-19 PROBLEM — O42.119 PRETERM PREMATURE RUPTURE OF MEMBRANES WITH ONSET OF LABOR MORE THAN 24 HOURS FOLLOWING RUPTURE: Status: ACTIVE | Noted: 2022-07-18

## 2022-07-19 LAB
C TRACH DNA SPEC QL NAA+PROBE: NEGATIVE
N GONORRHOEA DNA SPEC QL NAA+PROBE: NEGATIVE
RPR SER QL: NORMAL

## 2022-07-19 PROCEDURE — 99232 SBSQ HOSP IP/OBS MODERATE 35: CPT | Performed by: OBSTETRICS & GYNECOLOGY

## 2022-07-19 PROCEDURE — NC001 PR NO CHARGE: Performed by: OBSTETRICS & GYNECOLOGY

## 2022-07-19 PROCEDURE — 59025 FETAL NON-STRESS TEST: CPT | Performed by: OBSTETRICS & GYNECOLOGY

## 2022-07-19 PROCEDURE — 76816 OB US FOLLOW-UP PER FETUS: CPT | Performed by: OBSTETRICS & GYNECOLOGY

## 2022-07-19 PROCEDURE — 90715 TDAP VACCINE 7 YRS/> IM: CPT | Performed by: OBSTETRICS & GYNECOLOGY

## 2022-07-19 RX ORDER — DIPHENHYDRAMINE HCL 25 MG
25 TABLET ORAL EVERY 6 HOURS PRN
Status: DISCONTINUED | OUTPATIENT
Start: 2022-07-19 | End: 2022-07-28

## 2022-07-19 RX ADMIN — AMPICILLIN SODIUM 2000 MG: 2 INJECTION, POWDER, FOR SOLUTION INTRAMUSCULAR; INTRAVENOUS at 00:43

## 2022-07-19 RX ADMIN — AMPICILLIN SODIUM 2000 MG: 2 INJECTION, POWDER, FOR SOLUTION INTRAMUSCULAR; INTRAVENOUS at 06:44

## 2022-07-19 RX ADMIN — AMPICILLIN SODIUM 2000 MG: 2 INJECTION, POWDER, FOR SOLUTION INTRAMUSCULAR; INTRAVENOUS at 18:51

## 2022-07-19 RX ADMIN — DIPHENHYDRAMINE HCL 25 MG: 25 TABLET, COATED ORAL at 16:36

## 2022-07-19 RX ADMIN — Medication 1 TABLET: at 08:54

## 2022-07-19 RX ADMIN — AMPICILLIN SODIUM 2000 MG: 2 INJECTION, POWDER, FOR SOLUTION INTRAMUSCULAR; INTRAVENOUS at 13:03

## 2022-07-19 RX ADMIN — TETANUS TOXOID, REDUCED DIPHTHERIA TOXOID AND ACELLULAR PERTUSSIS VACCINE, ADSORBED 0.5 ML: 5; 2.5; 8; 8; 2.5 SUSPENSION INTRAMUSCULAR at 11:15

## 2022-07-19 RX ADMIN — DIPHENHYDRAMINE HCL 25 MG: 25 TABLET, COATED ORAL at 22:40

## 2022-07-19 RX ADMIN — BETAMETHASONE SODIUM PHOSPHATE AND BETAMETHASONE ACETATE 12 MG: 3; 3 INJECTION, SUSPENSION INTRA-ARTICULAR; INTRALESIONAL; INTRAMUSCULAR at 13:58

## 2022-07-19 NOTE — UTILIZATION REVIEW
Inpatient Admission Authorization Request   NOTIFICATION OF INPATIENT ADMISSION/INPATIENT AUTHORIZATION REQUEST   SERVICING FACILITY:   [address] [place] [address], [place] [address] [facility]  Tax ID: [id number]  NPI: [id number]  Place of Service: Inpatient [facility]  [address]  Place of Service Code: 24     ATTENDING PROVIDER:  Attending Name and NPI#: Fady Wills, Lynn Edas Driss [[id number]]  Address: [address] [place] [address], [place] [address] [address] [facility]  Phone: [phone]     UTILIZATION REVIEW CONTACT:  Hui Dougherty, Utilization   Network Utilization Review Department  Phone: [phone]  Fax [phone]  Email: [email or web address]     PHYSICIAN ADVISORY SERVICES:  FOR [email or web address] REVIEW - MEDICAL NECESSITY DENIAL  Phone: [phone]  Fax: [phone]  Email: [email or web address]  [email or web address]     TYPE OF REQUEST:  Inpatient Status     ADMISSION INFORMATION:  ADMISSION DATE/TIME: 7/18/22  1:31 PM  PATIENT DIAGNOSIS CODE/DESCRIPTION:  Vaginal discharge during pregnancy [O26 899, N89 8]  DISCHARGE DATE/TIME: 7/18/2022  4:00 PM   IMPORTANT INFORMATION:  Please contact Hui Dougherty directly with any questions or concerns regarding this request  Department voicemails are confidential     Send requests for admission clinical reviews, concurrent reviews, approvals, and administrative denials due to lack of clinical to fax [phone]

## 2022-07-19 NOTE — UTILIZATION REVIEW
Initial Clinical Review    Admission: Date/Time/Statement:   Admission Orders (From admission, onward)     Ordered        22 1652  Inpatient Admission  Once                      Orders Placed This Encounter   Procedures    Inpatient Admission     Standing Status:   Standing     Number of Occurrences:   1     Order Specific Question:   Level of Care     Answer:   Med Surg [16]     Order Specific Question:   Estimated length of stay     Answer:   More than 2 Midnights     Order Specific Question:   Certification     Answer:   I certify that inpatient services are medically necessary for this patient for a duration of greater than two midnights  See H&P and MD Progress Notes for additional information about the patient's course of treatment  Chief Complaint   Patient presents with    Rupture of Membranes       Initial Presentation: 23 y o  female , presented to L&D [facility], Admitted, Transferred to [facility], higher level of care, via EMS     Admitted as Inpatient due to  premature rupture of membranes  Date: 2022       at 28w2d  History of  premature rupture of membranes (PPROM):  Assessment & Plan:  Latency antibiotics of 48-hour treatment with IV ampicillin and erythromycin (or azithromycin) followed by 5 days of oral amoxicillin and erythromycin (or azithromycin) recommended to prolong latency if no contraindications (ACOG Level A)  Betamethasone -  Continuous external fetal monitoring overnight  Neonatology consult done at [facility], NICU notified at [facility]  Magnesium for neuroprotection  * 28 weeks gestation of pregnancy:  Assessment & Plan:   Admit to OB/GYN service with M consult  Follow up CBC, RPR, Blood Type  Sergio breech by transvaginal ultrasound  Day 2: 2022  Recommendations are as follows:  at 28w3d with PPROM  No evidence of labor, chorioamnioinitis, or abruption   Fetus is breech, so she is aware that  would be advised if delivery indicated  Growth US planned today  On day 2/7 of Latency abx  Of note, azithromycin is a one-time dose, not given x48h  S/p Magnesium sulfate for neuroprotection, would be resumed if delivery imminent prior to 32 weeks  Betamethasone dose #2 due today at 1400  Plan for intermittent fetal monitoring TID and PRN  GBS in process  Tdap ordered  Defer glucola x1 week after  steroids  Plan for inpatient management until delivery at 34 weeks, unless clinically indicated sooner  FHT:  Baseline Rate: 125 bpm  Variability: Moderate 6-25 bpm  Accelerations: 10 x 10 (<32 weeks)  Decelerations: None  FHR Category: Category I     TOCO:   Contraction Frequency (minutes): none  Contraction Duration (seconds): n/a  Contraction Quality: Not applicable     Triage Vitals [22 1651]   Temperature Pulse Respirations Blood Pressure SpO2   98 4 °F (36 9 °C) 101 18 114/68 98 %      Temp Source Heart Rate Source Patient Position - Orthostatic VS BP Location FiO2 (%)   Oral Monitor Sitting Right arm --      Pain Score       No Pain          Wt Readings from Last 1 Encounters:   22 81 6 kg (180 lb)     Additional Vital Signs:   Date/Time Temp Pulse Resp BP SpO2 O2 Device Cardiac (WDL) Patient Position - Orthostatic VS   22 1553 97 9 °F (36 6 °C) 93 16 101/58 98 % None (Room air) -- Lying   22 1210 98 5 °F (36 9 °C) 93 16 99/56 -- -- -- Sitting   22 0751 97 8 °F (36 6 °C) 97 16 98/63 -- -- -- Sitting   22 0745 -- -- -- -- -- -- -- --   Comment rows:   OBSERV: according to the patient, baby is active and denies abdominal pain   at 22 0745   22 0554 -- 90 -- 109/63 -- -- -- --   22 0503 97 8 °F (36 6 °C) 84 18 89/53 Abnormal   98 % None (Room air) -- Sitting   BP: JUAN Liz notified at 22 0503   22 2357 97 9 °F (36 6 °C) 96 18 111/65 98 % None (Room air) -- --   22 2153 -- 86 18 105/56 100 % None (Room air) -- Lying   22 98 °F (36 7 °C) 90 18 103/59 100 % None (Room air) WDL Lying   22 1651 98 4 °F (36 9 °C) 101 18 114/68 98 % None (Room air) -- Sitting         Pertinent Labs/Diagnostic Test Results:   Results from last 7 days   Lab Units 22  1350   WBC Thousand/uL 17 72* 16 48*   HEMOGLOBIN g/dL 10 7* 11 3*   HEMATOCRIT % 31 6* 33 2*   PLATELETS Thousands/uL 244 222     Results from last 7 days   Lab Units 22  1350   SODIUM mmol/L 133* 138   POTASSIUM mmol/L 3 9 3 5   CHLORIDE mmol/L 103 104   CO2 mmol/L 20* 23   ANION GAP mmol/L 10 11   BUN mg/dL 5 5   CREATININE mg/dL 0 51* 0 57*   EGFR ml/min/1 73sq m 134 129   CALCIUM mg/dL 7 2* 8 3     Results from last 7 days   Lab Units 22  1350   AST U/L 14 14   ALT U/L 11 20   ALK PHOS U/L 96 110   TOTAL PROTEIN g/dL 6 1* 6 7   ALBUMIN g/dL 3 2* 2 8*   TOTAL BILIRUBIN mg/dL 0 77 0 51     Results from last 7 days   Lab Units 22  1350   GLUCOSE RANDOM mg/dL 189* 82       Past Medical History:   Diagnosis Date    Common wart     Resolved 2016     Ganglion cyst     Resolved 2016     Migraine with aura     Resolved 2016     Varicella     vaccine     Present on Admission:    premature rupture of membranes with onset of labor more than 24 hours following rupture      Admitting Diagnosis: 28 weeks gestation of pregnancy [Z3A 28]  Premature rupture of membranes in pregnancy [O42 90]  Age/Sex: 23 y o  female  Admission Orders:  Regular diet  Obtain Clean catch Urine  Fetal nonstress test      Scheduled Medications:  [START ON 2022] amoxicillin, 250 mg, Oral, Q8H [place]  ampicillin, 2,000 mg, Intravenous, Q6H  prenatal multivitamin, 1 tablet, Oral, Daily      Continuous IV Infusions:     PRN Meds:  diphenhydrAMINE, 25 mg, Oral, Q6H PRN        IP CONSULT TO ANESTHESIOLOGY  IP CONSULT TO PERINATOLOGY  IP CONSULT TO NEONATOLOGY    Network Utilization Review Department  ATTENTION: Please call with any questions or concerns to [phone] and carefully listen to the prompts so that you are directed to the right person  All voicemails are confidential   Seda Muller all requests for admission clinical reviews, approved or denied determinations and any other requests to dedicated fax number below belonging to the campus where the patient is receiving treatment   List of dedicated fax numbers for the Facilities:  [address] DENIALS (Administrative/Medical Necessity) [phone]   [address] (Maternity/NICU/Pediatrics) [phone]   [address]  [address] [place] [address] [address] [place] [address] [address] Stephanie [address]ajal [address] [place] [address] [phone]

## 2022-07-19 NOTE — UTILIZATION REVIEW
Initial Clinical Review    Admission: Date/Time/Statement:   Admission Orders (From admission, onward)     Ordered        22 1330  Inpatient Admission  Once                      Orders Placed This Encounter   Procedures    Inpatient Admission     Standing Status:   Standing     Number of Occurrences:   1     Order Specific Question:   Level of Care     Answer:   Med Surg [16]     Order Specific Question:   Estimated length of stay     Answer:   More than 2 Midnights     Order Specific Question:   Certification     Answer:   I certify that inpatient services are medically necessary for this patient for a duration of greater than two midnights  See H&P and MD Progress Notes for additional information about the patient's course of treatment  Chief Complaint   Patient presents with    leaking of fluid     Patient reports that she had two separate episodes this morning where she was leaking fluid that was clear & watery   Vaginal Bleeding     Patient reports that she did notice pinkish/red blood on toilet tissue after using the bathroom  Initial Presentation: 23 y o  female , presented to [facility], from home via walk in  Admitted as Inpatient due to PPROM  Date: 2022  with an GUIDO of 10/8/2022, by Ultrasound at 28w2d who is being admitted for PPROM  She presented to triage complaining of leakage of fluid since 7:30 AM today that soaked through her underwear and shorts  She reports that upon wiping she notices "pink" colored discharge  She reports that the fluid is odorless  She denies having uterine contractions  She states she has felt good fetal movement     Slides:  Nitrazine Positive               Ferning positive               Pooling positive  Presentation: Complete Breech confirmed by Abdominal US  GBS status: Unknown (collected on 2022)     FHT:  Baseline Rate: 145 bpm  Variability: Moderate 6-25 bpm  Accelerations: 15 x 15 or greater  Decelerations: None     TOCO:   Contraction Frequency (minutes): none  Contraction Duration (seconds): n/a  Contraction Quality: Not applicable    History of  premature rupture of membranes (PPROM)  Assessment & Plan:  Admit  Penicillin for GBS prophylaxis (GBS collected, f/u results)  Azithromycin for latency antibiotics  Betamethasone for fetal lung maturity   Magnesium for neuroprotection  F/u CBC, CMP and RPR  FHT & [place] monitoring  Neonatology consulted  MFM consulted  Plan for transfer to [facility] for higher NICU level of care       Triage Vitals [22 1204]   Temperature Pulse Respirations Blood Pressure SpO2   97 9 °F (36 6 °C) 100 17 109/72 98 %      Temp Source Heart Rate Source Patient Position - Orthostatic VS BP Location FiO2 (%)   Oral Monitor Lying Right arm --      Pain Score       No Pain          Wt Readings from Last 1 Encounters:   22 81 6 kg (180 lb)     Additional Vital Signs:   Date/Time Temp Pulse Resp BP SpO2 O2 Device Patient Position - Orthostatic VS   22 1535 -- 89 17 119/72 98 % None (Room air) Lying   22 1516 98 3 °F (36 8 °C) -- -- -- -- -- --   22 1415 98 °F (36 7 °C) 96 16 110/75 99 % None (Room air) Lying   22 1300 98 2 °F (36 8 °C) -- -- -- -- -- --         Pertinent Labs/Diagnostic Test Results:   Results from last 7 days   Lab Units 22  1350   WBC Thousand/uL 17 72* 16 48*   HEMOGLOBIN g/dL 10 7* 11 3*   HEMATOCRIT % 31 6* 33 2*   PLATELETS Thousands/uL 244 222     Results from last 7 days   Lab Units 22  1350   SODIUM mmol/L 133* 138   POTASSIUM mmol/L 3 9 3 5   CHLORIDE mmol/L 103 104   CO2 mmol/L 20* 23   ANION GAP mmol/L 10 11   BUN mg/dL 5 5   CREATININE mg/dL 0 51* 0 57*   EGFR ml/min/1 73sq m 134 129   CALCIUM mg/dL 7 2* 8 3     Results from last 7 days   Lab Units 22  1350   AST U/L 14 14   ALT U/L 11 20   ALK PHOS U/L 96 110   TOTAL PROTEIN g/dL 6 1* 6 7   ALBUMIN g/dL 3 2* 2 8*   TOTAL BILIRUBIN mg/dL 0 77 0 51     Results from last 7 days   Lab Units 07/18/22  2134 07/18/22  1350   GLUCOSE RANDOM mg/dL 189* 82       Past Medical History:   Diagnosis Date    Common wart     Resolved 7/29/2016     Ganglion cyst     Resolved 7/29/2016     Migraine with aura     Resolved 12/27/2016     Varicella     vaccine     Admitting Diagnosis: Vaginal discharge during pregnancy [O26 899, N89 8]  Age/Sex: 23 y o  female  Admission Orders:  External Uterine Contraction Monitoring  Monitor Fetal Heart Tones external  Clear Liquid diet    Medications:  Current Facility-Administered Medications   Medication Dose Route Frequency    [START ON 7/20/2022] amoxicillin (AMOXIL) capsule 250 mg  250 mg Oral Q8H [facility]    ampicillin (OMNIPEN) 2,000 mg in sodium chloride 0 9 % 100 mL IVPB  2,000 mg Intravenous Q6H    lactated ringers infusion  50 mL/hr Intravenous Continuous    [START ON 7/19/2022] prenatal multivitamin tablet 1 tablet  1 tablet Oral Daily   Mg Sulfate  Betamethasone              Allergies   Allergen Reactions    Clindamycin Throat Swelling    Sumatriptan Nausea Only and Throat Swelling    Azithromycin GI Intolerance and Vomiting    Penicillins Rash         Network Utilization Review Department  ATTENTION: Please call with any questions or concerns to [phone] and carefully listen to the prompts so that you are directed to the right person  All voicemails are confidential   Avila Horner all requests for admission clinical reviews, approved or denied determinations and any other requests to dedicated fax number below belonging to the campus where the patient is receiving treatment   List of dedicated fax numbers for the Facilities:  [address] DENIALS (Administrative/Medical Necessity) [phone]   [address] (Maternity/NICU/Pediatrics) 261 [facility] [place] [address] [phone]   Amelia Manual [address] [facility] [address] [place] [address] [address] [place] [address] [address] [address] [place] [id number] [phone]

## 2022-07-19 NOTE — PROGRESS NOTES
MFM Progress note   Malcolm Kasper 23 y o  female MRN: [id number]  Unit/Bed#: -01 Encounter: [id number]    Assessment/Plan:    Ms Carmen Trevino is a 23 y o  Nils Linea at [facility] Day: 2, admitted with PPROM  History of  premature rupture of membranes (PPROM)  Assessment & Plan  Latency antibiotics of 48-hour treatment with IV ampicillin and erythromycin (or azithromycin) followed by 5 days of oral amoxicillin and erythromycin (or azithromycin) recommended to prolong latency if no contraindications (ACOG Level A)  Betamethasone -  Continuous external fetal monitoring overnight  [facility] consult done at [facility], NICU notified at [facility]      * 28 weeks gestation of pregnancy  Assessment & Plan  Admit to OB/GYN service with MFM consult  Follow up CBC, RPR, Blood Type  Complete breech by transvaginal ultrasound           Subjective:    Malcolm Kasper had no current complaints  No overnight events  She continues to have intermittent leakage of pink colored fluid and has used 3 pads since last night  She denied vaginal bleeding or contractions and endorses good fetal movement  She is not in pain  She is tolerating PO, and denies nausea or vomiting  Patient denies headache, changes in vision, chest pain, shortness of breath, or calf tenderness   She has some questions about delivery timing this AM    Objective:  /63   Pulse 90   Temp 97 8 °F (36 6 °C) (Oral)   Resp 18   Ht 6' (1 829 m)   Wt 81 6 kg (180 lb)   LMP 2021   SpO2 98%   BMI 24 41 kg/m²       Lab Results   Component Value Date    WBC 17 72 (H) 2022    HGB 10 7 (L) 2022    HCT 31 6 (L) 2022    MCV 94 2022     2022       Lab Results   Component Value Date    CALCIUM 7 2 (L) 2022    K 3 9 2022    CO2 20 (L) 2022     2022    BUN 5 2022    CREATININE 0 51 (L) 2022         FHT:  Baseline Rate: 125 bpm  Variability: Moderate 6-25 bpm  Accelerations: 10 x 10 (<32 weeks)  Decelerations: None  FHR Category: Category I    TOCO:   Contraction Frequency (minutes): none  Contraction Duration (seconds): n/a  Contraction Quality: Not applicable      General: Well appearing, no distress  Respiratory: Unlabored breathing, lungs CTA b/l  Cardiovascular: Regular rate, no m/r/g  Abdomen: Soft, gravid, nontender  Fundal Height: Appropriate for gestational age  Extremities: Warm and well perfused  Non tender        Reuben Hughes DO  7/19/2022  6:07 AM

## 2022-07-19 NOTE — PLAN OF CARE
Problem: ANTEPARTUM  Goal: Maintain pregnancy as long as maternal and/or fetal condition is stable  Description: INTERVENTIONS:  - Maternal surveillance  - Fetal surveillance  - Monitor uterine activity  - Medications as ordered  - Bedrest  Outcome: Progressing     Problem: PAIN - ADULT  Goal: Verbalizes/displays adequate comfort level or baseline comfort level  Description: Interventions:  - Encourage patient to monitor pain and request assistance  - Assess pain using appropriate pain scale  - Administer analgesics based on type and severity of pain and evaluate response  - Implement non-pharmacological measures as appropriate and evaluate response  - Consider cultural and social influences on pain and pain management  - Notify physician/advanced practitioner if interventions unsuccessful or patient reports new pain  Outcome: Progressing     Problem: INFECTION - ADULT  Goal: Absence or prevention of progression during hospitalization  Description: INTERVENTIONS:  - Assess and monitor for signs and symptoms of infection  - Monitor lab/diagnostic results  - Monitor all insertion sites, i e  indwelling lines, tubes, and drains  - Monitor endotracheal if appropriate and nasal secretions for changes in amount and color  - [place] appropriate cooling/warming therapies per order  - Administer medications as ordered  - Instruct and encourage patient and family to use good hand hygiene technique  - Identify and instruct in appropriate isolation precautions for identified infection/condition  Outcome: Progressing  Goal: Absence of fever/infection during neutropenic period  Description: INTERVENTIONS:  - Monitor WBC    Outcome: Progressing     Problem: SAFETY ADULT  Goal: Patient will remain free of falls  Description: INTERVENTIONS:  - Educate patient/family on patient safety including physical limitations  - Instruct patient to call for assistance with activity   - Consult OT/PT to assist with strengthening/mobility   - Keep Call bell within reach  - Keep bed low and locked with side rails adjusted as appropriate  - Keep care items and personal belongings within reach  - Initiate and maintain comfort rounds  - Make Fall Risk Sign visible to staff  - Offer Toileting every  Hours, in advance of need  - Initiate/Maintain alarm  - Obtain necessary fall risk management equipment:   - Apply yellow socks and bracelet for high fall risk patients  - Consider moving patient to room near nurses station  Outcome: Progressing  Goal: Maintain or return to baseline ADL function  Description: INTERVENTIONS:  -  Assess patient's ability to carry out ADLs; assess patient's baseline for ADL function and identify physical deficits which impact ability to perform ADLs (bathing, care of mouth/teeth, toileting, grooming, dressing, etc )  - Assess/evaluate cause of self-care deficits   - Assess range of motion  - Assess patient's mobility; develop plan if impaired  - Assess patient's need for assistive devices and provide as appropriate  - Encourage maximum independence but intervene and supervise when necessary  - Involve family in performance of ADLs  - Assess for home care needs following discharge   - Consider OT consult to assist with ADL evaluation and planning for discharge  - Provide patient education as appropriate  Outcome: Progressing  Goal: Maintains/Returns to pre admission functional level  Description: INTERVENTIONS:  - Perform BMAT or MOVE assessment daily    - Set and communicate daily mobility goal to care team and patient/family/caregiver  - Collaborate with rehabilitation services on mobility goals if consulted  - Perform Range of Motion  times a day  - Reposition patient every hours    - Dangle patient  times a day  - Stand patient  times a day  - Ambulate patient  times a day  - Out of bed to chair  times a day   - Out of bed for meals  times a day  - Out of bed for toileting  - Record patient progress and toleration of activity level   Outcome: Progressing     Problem: Knowledge Deficit  Goal: Patient/family/caregiver demonstrates understanding of disease process, treatment plan, medications, and discharge instructions  Description: Complete learning assessment and assess knowledge base    Interventions:  - Provide teaching at level of understanding  - Provide teaching via preferred learning methods  Outcome: Progressing     Problem: DISCHARGE PLANNING  Goal: Discharge to home or other facility with appropriate resources  Description: INTERVENTIONS:  - Identify barriers to discharge w/patient and caregiver  - Arrange for needed discharge resources and transportation as appropriate  - Identify discharge learning needs (meds, wound care, etc )  - Arrange for interpretive services to assist at discharge as needed  - Refer to Case Management Department for coordinating discharge planning if the patient needs post-hospital services based on physician/advanced practitioner order or complex needs related to functional status, cognitive ability, or social support system  Outcome: Progressing

## 2022-07-20 LAB — GP B STREP DNA SPEC QL NAA+PROBE: NEGATIVE

## 2022-07-20 PROCEDURE — 99232 SBSQ HOSP IP/OBS MODERATE 35: CPT | Performed by: OBSTETRICS & GYNECOLOGY

## 2022-07-20 PROCEDURE — 59025 FETAL NON-STRESS TEST: CPT | Performed by: OBSTETRICS & GYNECOLOGY

## 2022-07-20 PROCEDURE — NC001 PR NO CHARGE: Performed by: OBSTETRICS & GYNECOLOGY

## 2022-07-20 RX ORDER — AMOXICILLIN 250 MG/1
250 CAPSULE ORAL EVERY 8 HOURS SCHEDULED
Status: DISPENSED | OUTPATIENT
Start: 2022-07-20 | End: 2022-07-25

## 2022-07-20 RX ORDER — AMOXICILLIN 250 MG/1
250 CAPSULE ORAL EVERY 8 HOURS SCHEDULED
Status: DISCONTINUED | OUTPATIENT
Start: 2022-07-21 | End: 2022-07-20

## 2022-07-20 RX ORDER — POLYETHYLENE GLYCOL 3350 17 G/17G
17 POWDER, FOR SOLUTION ORAL DAILY PRN
Status: DISCONTINUED | OUTPATIENT
Start: 2022-07-20 | End: 2022-08-01 | Stop reason: HOSPADM

## 2022-07-20 RX ORDER — DOCUSATE SODIUM 100 MG/1
100 CAPSULE, LIQUID FILLED ORAL 2 TIMES DAILY
Status: DISCONTINUED | OUTPATIENT
Start: 2022-07-20 | End: 2022-08-01 | Stop reason: HOSPADM

## 2022-07-20 RX ADMIN — DOCUSATE SODIUM 100 MG: 100 CAPSULE, LIQUID FILLED ORAL at 18:21

## 2022-07-20 RX ADMIN — Medication 1 TABLET: at 08:17

## 2022-07-20 RX ADMIN — AMPICILLIN SODIUM 2000 MG: 2 INJECTION, POWDER, FOR SOLUTION INTRAMUSCULAR; INTRAVENOUS at 13:03

## 2022-07-20 RX ADMIN — DIPHENHYDRAMINE HCL 25 MG: 25 TABLET, COATED ORAL at 09:37

## 2022-07-20 RX ADMIN — AMPICILLIN SODIUM 2000 MG: 2 INJECTION, POWDER, FOR SOLUTION INTRAMUSCULAR; INTRAVENOUS at 07:19

## 2022-07-20 RX ADMIN — DOCUSATE SODIUM 100 MG: 100 CAPSULE, LIQUID FILLED ORAL at 08:17

## 2022-07-20 RX ADMIN — AMOXICILLIN 250 MG: 250 CAPSULE ORAL at 18:21

## 2022-07-20 RX ADMIN — AMPICILLIN SODIUM 2000 MG: 2 INJECTION, POWDER, FOR SOLUTION INTRAMUSCULAR; INTRAVENOUS at 00:56

## 2022-07-20 NOTE — PLAN OF CARE
Problem: ANTEPARTUM  Goal: Maintain pregnancy as long as maternal and/or fetal condition is stable  Description: INTERVENTIONS:  - Maternal surveillance  - Fetal surveillance  - Monitor uterine activity  - Medications as ordered  - Bedrest  Outcome: Progressing     Problem: PAIN - ADULT  Goal: Verbalizes/displays adequate comfort level or baseline comfort level  Description: Interventions:  - Encourage patient to monitor pain and request assistance  - Assess pain using appropriate pain scale  - Administer analgesics based on type and severity of pain and evaluate response  - Implement non-pharmacological measures as appropriate and evaluate response  - Consider cultural and social influences on pain and pain management  - Notify physician/advanced practitioner if interventions unsuccessful or patient reports new pain  Outcome: Progressing     Problem: INFECTION - ADULT  Goal: Absence or prevention of progression during hospitalization  Description: INTERVENTIONS:  - Assess and monitor for signs and symptoms of infection  - Monitor lab/diagnostic results  - Monitor all insertion sites, i e  indwelling lines, tubes, and drains  - Monitor endotracheal if appropriate and nasal secretions for changes in amount and color  - [place] appropriate cooling/warming therapies per order  - Administer medications as ordered  - Instruct and encourage patient and family to use good hand hygiene technique  - Identify and instruct in appropriate isolation precautions for identified infection/condition  Outcome: Progressing  Goal: Absence of fever/infection during neutropenic period  Description: INTERVENTIONS:  - Monitor WBC    Outcome: Progressing     Problem: SAFETY ADULT  Goal: Patient will remain free of falls  Description: INTERVENTIONS:  - Educate patient/family on patient safety including physical limitations  - Instruct patient to call for assistance with activity   - Consult OT/PT to assist with strengthening/mobility   - Keep Call bell within reach  - Keep bed low and locked with side rails adjusted as appropriate  - Keep care items and personal belongings within reach  - Initiate and maintain comfort rounds  - Make Fall Risk Sign visible to staff  - Offer Toileting every  Hours, in advance of need  - Initiate/Maintain alarm  - Obtain necessary fall risk management equipment:   - Apply yellow socks and bracelet for high fall risk patients  - Consider moving patient to room near nurses station  Outcome: Progressing  Goal: Maintain or return to baseline ADL function  Description: INTERVENTIONS:  -  Assess patient's ability to carry out ADLs; assess patient's baseline for ADL function and identify physical deficits which impact ability to perform ADLs (bathing, care of mouth/teeth, toileting, grooming, dressing, etc )  - Assess/evaluate cause of self-care deficits   - Assess range of motion  - Assess patient's mobility; develop plan if impaired  - Assess patient's need for assistive devices and provide as appropriate  - Encourage maximum independence but intervene and supervise when necessary  - Involve family in performance of ADLs  - Assess for home care needs following discharge   - Consider OT consult to assist with ADL evaluation and planning for discharge  - Provide patient education as appropriate  Outcome: Progressing  Goal: Maintains/Returns to pre admission functional level  Description: INTERVENTIONS:  - Perform BMAT or MOVE assessment daily    - Set and communicate daily mobility goal to care team and patient/family/caregiver  - Collaborate with rehabilitation services on mobility goals if consulted  - Perform Range of Motion times a day  - Reposition patient every  hours    - Dangle patient times a day  - Stand patient  times a day  - Ambulate patient  times a day  - Out of bed to chair  times a day   - Out of bed for meals  times a day  - Out of bed for toileting  - Record patient progress and toleration of activity level   Outcome: Progressing     Problem: Knowledge Deficit  Goal: Patient/family/caregiver demonstrates understanding of disease process, treatment plan, medications, and discharge instructions  Description: Complete learning assessment and assess knowledge base    Interventions:  - Provide teaching at level of understanding  - Provide teaching via preferred learning methods  Outcome: Progressing     Problem: DISCHARGE PLANNING  Goal: Discharge to home or other facility with appropriate resources  Description: INTERVENTIONS:  - Identify barriers to discharge w/patient and caregiver  - Arrange for needed discharge resources and transportation as appropriate  - Identify discharge learning needs (meds, wound care, etc )  - Arrange for interpretive services to assist at discharge as needed  - Refer to Case Management Department for coordinating discharge planning if the patient needs post-hospital services based on physician/advanced practitioner order or complex needs related to functional status, cognitive ability, or social support system  Outcome: Progressing

## 2022-07-20 NOTE — PROGRESS NOTES
MFM Progress note   Rafa Ivey 23 y o  female MRN: [id number]  Unit/Bed#: -01 Encounter: [id number]    Assessment/Plan:    Ms Tang Betancourt is a 23 y o  Stefania Leanna at 33w3d, Hospital Day: 3, admitted with PPROM   premature rupture of membranes with onset of labor more than 24 hours following rupture  Assessment & Plan  No contraindications to expectant management (labor, abruption, infection)  Latency abx day  (Azithromycin x1 + Ampicillin-->amoxicillin)  Betmethasone  and   Consider rescue course after 14 days and when delivery likely within the following week  NICU aware  Breech presentation, for  delivery  GBS pending  UCx <10k mixed rhonda  LEE ANN 18cm on growth scan     * 28 weeks gestation of pregnancy  Assessment & Plan  Routine prenatal care inpatient  Complete breech by transvaginal ultrasound  Tdap done 22  Glucola should be done 1 week out from BMZ         Subjective:    Rafa Ivey had no current complaints  Overnight events: she is continuing to have large gushes requiring her to change her pad overnight  She is not in pain  Feeling good fetal movement  Does have PCN allergy and was getting a rash on her cheeks from the IV ampicillin as well as mildly scratchy throat, this resolved w/ benadryl      Objective:  BP 90/52 (BP Location: Right arm)   Pulse 95   Temp 98 4 °F (36 9 °C) (Oral)   Resp 16   Ht 6' (1 829 m)   Wt 81 6 kg (180 lb)   LMP 2021   SpO2 98%   BMI 24 41 kg/m²       Lab Results   Component Value Date    WBC 17 72 (H) 2022    HGB 10 7 (L) 2022    HCT 31 6 (L) 2022    MCV 94 2022     2022       Lab Results   Component Value Date    CALCIUM 7 2 (L) 2022    K 3 9 2022    CO2 20 (L) 2022     2022    BUN 5 2022    CREATININE 0 51 (L) 2022         FHT:  Baseline Rate: 130 bpm  Variability: Moderate 6-25 bpm  Accelerations: 10 x 10 (<32 weeks), At variable times  Decelerations: None  FHR Category: Category I    TOCO:   Contraction Frequency (minutes): none (pt denies cramping/contractions)  Contraction Duration (seconds): n/a  Contraction Quality: Not applicable      General: Well appearing, no distress  Respiratory: Unlabored breathing  Cardiovascular: Regular rate  Abdomen: Soft, gravid, nontender  Fundal Height: Appropriate for gestational age  Non-tender  Pelvic: Deferred  SVE:  Deferred  Extremities: Warm and well perfused  Non tender        Chapis EdmondDO  7/20/2022  5:37 AM

## 2022-07-20 NOTE — ASSESSMENT & PLAN NOTE
1  Tdap done 7/19/22  GBS negative  Daily prenatal vitamins  2  Status post betamethasone for fetal lung maturation  3  Status post Neonatology consult  4  Status post magnesium for fetal neuroprotection  5  Last growth scan on 7/19 with estimated fetal weight of 1378 g (72%)  6  NST 3 times daily  7  Reassess fetal presentation as needed   Transverse on 7/27  8  1 hr glucola WNL

## 2022-07-21 LAB
ABO GROUP BLD: NORMAL
BLD GP AB SCN SERPL QL: NEGATIVE
RH BLD: POSITIVE
SPECIMEN EXPIRATION DATE: NORMAL

## 2022-07-21 PROCEDURE — 86850 RBC ANTIBODY SCREEN: CPT | Performed by: OBSTETRICS & GYNECOLOGY

## 2022-07-21 PROCEDURE — 86900 BLOOD TYPING SEROLOGIC ABO: CPT | Performed by: OBSTETRICS & GYNECOLOGY

## 2022-07-21 PROCEDURE — NC001 PR NO CHARGE: Performed by: OBSTETRICS & GYNECOLOGY

## 2022-07-21 PROCEDURE — 86901 BLOOD TYPING SEROLOGIC RH(D): CPT | Performed by: OBSTETRICS & GYNECOLOGY

## 2022-07-21 PROCEDURE — 59025 FETAL NON-STRESS TEST: CPT | Performed by: OBSTETRICS & GYNECOLOGY

## 2022-07-21 RX ORDER — LANOLIN ALCOHOL/MO/W.PET/CERES
3 CREAM (GRAM) TOPICAL
Status: DISCONTINUED | OUTPATIENT
Start: 2022-07-21 | End: 2022-08-01 | Stop reason: HOSPADM

## 2022-07-21 RX ORDER — SIMETHICONE 80 MG
80 TABLET,CHEWABLE ORAL EVERY 6 HOURS PRN
Status: DISCONTINUED | OUTPATIENT
Start: 2022-07-21 | End: 2022-08-01 | Stop reason: HOSPADM

## 2022-07-21 RX ORDER — CALCIUM CARBONATE 200(500)MG
500 TABLET,CHEWABLE ORAL DAILY PRN
Status: DISCONTINUED | OUTPATIENT
Start: 2022-07-21 | End: 2022-07-22

## 2022-07-21 RX ADMIN — DOCUSATE SODIUM 100 MG: 100 CAPSULE, LIQUID FILLED ORAL at 18:35

## 2022-07-21 RX ADMIN — AMOXICILLIN 250 MG: 250 CAPSULE ORAL at 06:17

## 2022-07-21 RX ADMIN — CALCIUM CARBONATE (ANTACID) CHEW TAB 500 MG 500 MG: 500 CHEW TAB at 18:36

## 2022-07-21 RX ADMIN — AMOXICILLIN 250 MG: 250 CAPSULE ORAL at 15:25

## 2022-07-21 RX ADMIN — CALCIUM CARBONATE (ANTACID) CHEW TAB 500 MG 500 MG: 500 CHEW TAB at 16:44

## 2022-07-21 RX ADMIN — AMOXICILLIN 250 MG: 250 CAPSULE ORAL at 21:49

## 2022-07-21 RX ADMIN — Medication 1 TABLET: at 10:19

## 2022-07-21 RX ADMIN — DOCUSATE SODIUM 100 MG: 100 CAPSULE, LIQUID FILLED ORAL at 10:19

## 2022-07-21 NOTE — UTILIZATION REVIEW
Continued Stay Review    Date: 7/21/2022                          Current Patient Class: inpatient    Current Level of Care: L&D med surg     HPI:26 y o  female initially admitted on 7/18/2022    Assessment/Plan:   Moises Godwin at [address] Day: 4, admitted with PPROM  Afebrile; Gushes have slowed down & she is not in pain  FHT category 1; complete Breech by TAUS  - for CS delivery  toco contraction freq none  SVE deferred  LEE ANN 18cm on growth scan 7/19  Latency abx day 3/7 (Azithromycin x1 + Ampicillin-->amoxicillin)  Betmethasone 7/18 and 7/19  Consider rescue course after 14 days and when delivery likely within the following week  Needs Blood type & screen Q3Day next due today  GBS neg, urine culture w mixed rhonda   Glucosa due 1 wk from BTM    Vital Signs:   Date/Time Temp Pulse Resp BP SpO2 O2 Device Cardiac (WDL) Patient Position - Orthostatic VS   07/21/22 0751 98 4 °F (36 9 °C) 64 18 97/56 98 % None (Room air) WDL Lying   07/21/22 0513 98 1 °F (36 7 °C) -- -- -- -- -- -- --   07/20/22 2347 98 1 °F (36 7 °C) 82 18 106/59 100 % None (Room air) -- Sitting         Pertinent Labs/Diagnostic Results:       Results from last 7 days   Lab Units 07/18/22 2134 07/18/22  1350   WBC Thousand/uL 17 72* 16 48*   HEMOGLOBIN g/dL 10 7* 11 3*   HEMATOCRIT % 31 6* 33 2*   PLATELETS Thousands/uL 244 222         Results from last 7 days   Lab Units 07/18/22 2134 07/18/22  1350   SODIUM mmol/L 133* 138   POTASSIUM mmol/L 3 9 3 5   CHLORIDE mmol/L 103 104   CO2 mmol/L 20* 23   ANION GAP mmol/L 10 11   BUN mg/dL 5 5   CREATININE mg/dL 0 51* 0 57*   EGFR ml/min/1 73sq m 134 129   CALCIUM mg/dL 7 2* 8 3     Results from last 7 days   Lab Units 07/18/22 2134 07/18/22  1350   AST U/L 14 14   ALT U/L 11 20   ALK PHOS U/L 96 110   TOTAL PROTEIN g/dL 6 1* 6 7   ALBUMIN g/dL 3 2* 2 8*   TOTAL BILIRUBIN mg/dL 0 77 0 51         Results from last 7 days   Lab Units 07/18/22  2134 07/18/22  1350   GLUCOSE RANDOM mg/dL 189* 82 Medications:   Scheduled Medications:  amoxicillin, 250 mg, Oral, Q8H [place]  docusate sodium, 100 mg, Oral, BID  prenatal multivitamin, 1 tablet, Oral, Daily  Continuous IV Infusions:     PRN Meds:  diphenhydrAMINE, 25 mg, Oral, Q6H PRN  polyethylene glycol, 17 g, Oral, Daily PRN    Discharge Plan: TBD    Network Utilization Review Department  ATTENTION: Please call with any questions or concerns to [phone] and carefully listen to the prompts so that you are directed to the right person  All voicemails are confidential   Fozia Wang all requests for admission clinical reviews, approved or denied determinations and any other requests to dedicated fax number below belonging to the campus where the patient is receiving treatment   List of dedicated fax numbers for the Facilities:  [address] DENIALS (Administrative/Medical Necessity) [phone]   [address] (Maternity/NICU/Pediatrics) [phone]   [address]  [address] [address] Medical [place] [address] [id number] [place] [address] [address] Stephanie [address]ajal [address] [facility] [place] [address] [phone]

## 2022-07-21 NOTE — PLAN OF CARE
Problem: ANTEPARTUM  Goal: Maintain pregnancy as long as maternal and/or fetal condition is stable  Description: INTERVENTIONS:  - Maternal surveillance  - Fetal surveillance  - Monitor uterine activity  - Medications as ordered  - Bedrest  Outcome: Progressing     Problem: PAIN - ADULT  Goal: Verbalizes/displays adequate comfort level or baseline comfort level  Description: Interventions:  - Encourage patient to monitor pain and request assistance  - Assess pain using appropriate pain scale  - Administer analgesics based on type and severity of pain and evaluate response  - Implement non-pharmacological measures as appropriate and evaluate response  - Consider cultural and social influences on pain and pain management  - Notify physician/advanced practitioner if interventions unsuccessful or patient reports new pain  Outcome: Progressing     Problem: INFECTION - ADULT  Goal: Absence or prevention of progression during hospitalization  Description: INTERVENTIONS:  - Assess and monitor for signs and symptoms of infection  - Monitor lab/diagnostic results  - Monitor all insertion sites, i e  indwelling lines, tubes, and drains  - Monitor endotracheal if appropriate and nasal secretions for changes in amount and color  - [place] appropriate cooling/warming therapies per order  - Administer medications as ordered  - Instruct and encourage patient and family to use good hand hygiene technique  - Identify and instruct in appropriate isolation precautions for identified infection/condition  Outcome: Progressing  Goal: Absence of fever/infection during neutropenic period  Description: INTERVENTIONS:  - Monitor WBC    Outcome: Progressing     Problem: SAFETY ADULT  Goal: Patient will remain free of falls  Description: INTERVENTIONS:  - Educate patient/family on patient safety including physical limitations  - Instruct patient to call for assistance with activity   - Consult OT/PT to assist with strengthening/mobility   - Keep Call bell within reach  - Keep bed low and locked with side rails adjusted as appropriate  - Keep care items and personal belongings within reach  - Initiate and maintain comfort rounds  - Make Fall Risk Sign visible to staff  - Offer Toileting every  Hours, in advance of need  - Initiate/Maintain alarm  - Obtain necessary fall risk management equipment:   - Apply yellow socks and bracelet for high fall risk patients  - Consider moving patient to room near nurses station  Outcome: Progressing  Goal: Maintain or return to baseline ADL function  Description: INTERVENTIONS:  -  Assess patient's ability to carry out ADLs; assess patient's baseline for ADL function and identify physical deficits which impact ability to perform ADLs (bathing, care of mouth/teeth, toileting, grooming, dressing, etc )  - Assess/evaluate cause of self-care deficits   - Assess range of motion  - Assess patient's mobility; develop plan if impaired  - Assess patient's need for assistive devices and provide as appropriate  - Encourage maximum independence but intervene and supervise when necessary  - Involve family in performance of ADLs  - Assess for home care needs following discharge   - Consider OT consult to assist with ADL evaluation and planning for discharge  - Provide patient education as appropriate  Outcome: Progressing  Goal: Maintains/Returns to pre admission functional level  Description: INTERVENTIONS:  - Perform BMAT or MOVE assessment daily    - Set and communicate daily mobility goal to care team and patient/family/caregiver  - Collaborate with rehabilitation services on mobility goals if consulted  - Perform Range of Motion  times a day  - Reposition patient every  hours    - Dangle patient  times a day  - Stand patient  times a day  - Ambulate patient  times a day  - Out of bed to chair  times a day   - Out of bed for meals times a day  - Out of bed for toileting  - Record patient progress and toleration of activity level   Outcome: Progressing     Problem: Knowledge Deficit  Goal: Patient/family/caregiver demonstrates understanding of disease process, treatment plan, medications, and discharge instructions  Description: Complete learning assessment and assess knowledge base    Interventions:  - Provide teaching at level of understanding  - Provide teaching via preferred learning methods  Outcome: Progressing     Problem: DISCHARGE PLANNING  Goal: Discharge to home or other facility with appropriate resources  Description: INTERVENTIONS:  - Identify barriers to discharge w/patient and caregiver  - Arrange for needed discharge resources and transportation as appropriate  - Identify discharge learning needs (meds, wound care, etc )  - Arrange for interpretive services to assist at discharge as needed  - Refer to Case Management Department for coordinating discharge planning if the patient needs post-hospital services based on physician/advanced practitioner order or complex needs related to functional status, cognitive ability, or social support system  Outcome: Progressing

## 2022-07-21 NOTE — PROGRESS NOTES
MFM Progress note   Lea Zamora 23 y o  female MRN: [id number]  Unit/Bed#: -01 Encounter: [id number]    Assessment/Plan:    Ms Yaritza Manning is a 23 y o  Marcelene Awe at [address] Day: 4, admitted with PPROM   premature rupture of membranes with onset of labor more than 24 hours following rupture  Assessment & Plan  No contraindications to expectant management (labor, abruption, infection)  Latency abx day 3/7 (Azithromycin x1 + Ampicillin-->amoxicillin)  Betmethasone  and   Consider rescue course after 14 days and when delivery likely within the following week  NICU consult done at [facility] NICU aware, update consult PRN  Breech presentation, for  delivery  GBS negative  UCx <10k mixed rhonda  LEE ANN 18cm on growth scan   Needs type & screen q3d (next due )    * 28 weeks gestation of pregnancy  Assessment & Plan  Routine prenatal care inpatient  Complete breech by transvaginal ultrasound  Tdap done 22  Glucola should be done 1 week out from [facility]             Subjective:    Lea Zamora had no current complaints  Overnight events: gushes have slowed down  She is not in pain  She has some questions about getting a breast pump which is typically done through the office, I let her know that I would send a message through epic to Dr Lombardo Pain office to order her a Spectra S2 breast pump    I will also place a nursing communication that she may walk outside as she has been inside for the past 4 days    Objective:  /59 (BP Location: Right arm)   Pulse 82   Temp 98 1 °F (36 7 °C) (Oral)   Resp 18   Ht 6' (1 829 m)   Wt 81 6 kg (180 lb)   LMP 2021   SpO2 100%   BMI 24 41 kg/m²       Lab Results   Component Value Date    WBC 17 72 (H) 2022    HGB 10 7 (L) 2022    HCT 31 6 (L) 2022    MCV 94 2022     2022       Lab Results   Component Value Date    CALCIUM 7 2 (L) 2022    K 3 9 2022    CO2 20 (L) 2022  07/18/2022    BUN 5 07/18/2022    CREATININE 0 51 (L) 07/18/2022         FHT:  Baseline Rate: 140 bpm  Variability: Moderate 6-25 bpm  Accelerations: 10 x 10 (<32 weeks), At variable times  Decelerations: None  FHR Category: Category I    TOCO:   Contraction Frequency (minutes): 0  Contraction Duration (seconds): n/a  Contraction Quality: Not applicable      General: Well appearing, no distress  Respiratory: Unlabored breathing  Cardiovascular: Regular rate  Abdomen: Soft, gravid, nontender  Fundal Height: Appropriate for gestational age  Pelvic: Defer  SVE:  Deferred  Extremities: Warm and well perfused  Non tender        Phylliss Prow DO  7/21/2022  6:42 AM

## 2022-07-22 ENCOUNTER — TELEPHONE (OUTPATIENT)
Dept: OBGYN CLINIC | Facility: MEDICAL CENTER | Age: 27
End: 2022-07-22

## 2022-07-22 PROCEDURE — 76815 OB US LIMITED FETUS(S): CPT | Performed by: OBSTETRICS & GYNECOLOGY

## 2022-07-22 PROCEDURE — NC001 PR NO CHARGE: Performed by: OBSTETRICS & GYNECOLOGY

## 2022-07-22 PROCEDURE — 99231 SBSQ HOSP IP/OBS SF/LOW 25: CPT | Performed by: OBSTETRICS & GYNECOLOGY

## 2022-07-22 PROCEDURE — 59025 FETAL NON-STRESS TEST: CPT | Performed by: OBSTETRICS & GYNECOLOGY

## 2022-07-22 RX ORDER — FAMOTIDINE 20 MG/1
20 TABLET, FILM COATED ORAL 2 TIMES DAILY
Status: DISCONTINUED | OUTPATIENT
Start: 2022-07-22 | End: 2022-08-01 | Stop reason: HOSPADM

## 2022-07-22 RX ORDER — CALCIUM CARBONATE 200(500)MG
1000 TABLET,CHEWABLE ORAL 2 TIMES DAILY PRN
Status: DISCONTINUED | OUTPATIENT
Start: 2022-07-22 | End: 2022-08-01 | Stop reason: HOSPADM

## 2022-07-22 RX ADMIN — FAMOTIDINE 20 MG: 20 TABLET ORAL at 22:31

## 2022-07-22 RX ADMIN — AMOXICILLIN 250 MG: 250 CAPSULE ORAL at 22:03

## 2022-07-22 RX ADMIN — DOCUSATE SODIUM 100 MG: 100 CAPSULE, LIQUID FILLED ORAL at 08:57

## 2022-07-22 RX ADMIN — Medication 1 TABLET: at 08:57

## 2022-07-22 RX ADMIN — CALCIUM CARBONATE (ANTACID) CHEW TAB 500 MG 500 MG: 500 CHEW TAB at 00:17

## 2022-07-22 RX ADMIN — AMOXICILLIN 250 MG: 250 CAPSULE ORAL at 06:02

## 2022-07-22 RX ADMIN — AMOXICILLIN 250 MG: 250 CAPSULE ORAL at 14:09

## 2022-07-22 RX ADMIN — CALCIUM CARBONATE (ANTACID) CHEW TAB 500 MG 500 MG: 500 CHEW TAB at 20:26

## 2022-07-22 RX ADMIN — CALCIUM CARBONATE (ANTACID) CHEW TAB 500 MG 500 MG: 500 CHEW TAB at 22:05

## 2022-07-22 RX ADMIN — CALCIUM CARBONATE (ANTACID) CHEW TAB 500 MG 500 MG: 500 CHEW TAB at 00:18

## 2022-07-22 RX ADMIN — DOCUSATE SODIUM 100 MG: 100 CAPSULE, LIQUID FILLED ORAL at 18:22

## 2022-07-22 NOTE — PLAN OF CARE
Problem: ANTEPARTUM  Goal: Maintain pregnancy as long as maternal and/or fetal condition is stable  Description: INTERVENTIONS:  - Maternal surveillance  - Fetal surveillance  - Monitor uterine activity  - Medications as ordered  - Bedrest  Outcome: Progressing     Problem: PAIN - ADULT  Goal: Verbalizes/displays adequate comfort level or baseline comfort level  Description: Interventions:  - Encourage patient to monitor pain and request assistance  - Assess pain using appropriate pain scale  - Administer analgesics based on type and severity of pain and evaluate response  - Implement non-pharmacological measures as appropriate and evaluate response  - Consider cultural and social influences on pain and pain management  - Notify physician/advanced practitioner if interventions unsuccessful or patient reports new pain  Outcome: Progressing     Problem: INFECTION - ADULT  Goal: Absence or prevention of progression during hospitalization  Description: INTERVENTIONS:  - Assess and monitor for signs and symptoms of infection  - Monitor lab/diagnostic results  - Monitor all insertion sites, i e  indwelling lines, tubes, and drains  - Monitor endotracheal if appropriate and nasal secretions for changes in amount and color  - [place] appropriate cooling/warming therapies per order  - Administer medications as ordered  - Instruct and encourage patient and family to use good hand hygiene technique  - Identify and instruct in appropriate isolation precautions for identified infection/condition  Outcome: Progressing  Goal: Absence of fever/infection during neutropenic period  Description: INTERVENTIONS:  - Monitor WBC    Outcome: Progressing

## 2022-07-22 NOTE — PROGRESS NOTES
MFM Progress note   Lance Downing 23 y o  female MRN: [id number]  Unit/Bed#: -01 Encounter: [id number]    Assessment/Plan:    Ms Joceline Hernandez is a 23 y o  Michelle Sutherlandp at [address] Day: 5, admitted with PPROM   premature rupture of membranes with onset of labor more than 24 hours following rupture  Assessment & Plan  No contraindications to expectant management (labor, abruption, infection)  Latency abx day 3/7 (Azithromycin x1 + Ampicillin-->amoxicillin)  Betmethasone  and   Consider rescue course after 14 days and when delivery likely within the following week  NICU consult done at [facility] NICU aware, update consult PRN  Breech presentation, for  delivery  GBS negative  UCx <10k mixed rhonda  LEE ANN 18cm on growth scan   Type & Screen ordered q3 days standing  1 hr glucola can be done anytime after  (1 week out from [facility])    * 28 weeks gestation of pregnancy  Assessment & Plan  Routine prenatal care inpatient  Complete breech by transvaginal ultrasound  Tdap done 22  Glucola should be done 1 week out from [facility]             Subjective:    Lance Downing had no current complaints  Overnight events: still having leakage   Will repeat speculum exam and LEE ANN this AM  Had updated T&S yesterday    Objective:  /51   Pulse 92   Temp 97 6 °F (36 4 °C) (Oral)   Resp 18   Ht 6' (1 829 m)   Wt 81 6 kg (180 lb)   LMP 2021   SpO2 96%   BMI 24 41 kg/m²       Lab Results   Component Value Date    WBC 17 72 (H) 2022    HGB 10 7 (L) 2022    HCT 31 6 (L) 2022    MCV 94 2022     2022       Lab Results   Component Value Date    CALCIUM 7 2 (L) 2022    K 3 9 2022    CO2 20 (L) 2022     2022    BUN 5 2022    CREATININE 0 51 (L) 2022         FHT:  Baseline Rate: 145 bpm  Variability: Moderate 6-25 bpm  Accelerations: 10 x 10 (<32 weeks)  Decelerations: None  FHR Category: Category I    TOCO: Contraction Frequency (minutes): 0  Contraction Duration (seconds): n/a  Contraction Quality: Not applicable      General: Well appearing, no distress  Respiratory: Unlabored breathing  Cardiovascular: Regular rate  Abdomen: Soft, gravid, nontender  Fundal Height: Appropriate for gestational age  Pelvic: Defer  SVE:  Deferred  Extremities: Warm and well perfused  Non tender        Sarita Rojas DO  7/22/2022  6:37 AM

## 2022-07-22 NOTE — QUICK NOTE
Speculum exam repeated this AM given normal LEE ANN earlier this week  Again, mild pooling is noted  Nitrazine is blue  No ferning is seen on slide, no clue cells, no hyphae  No lesions were seen on vulva or vagina indicative of HSV (which can mimic rupture of membranes)  She will be for LEE ANN later today by sonographer    Exam chaperoned by JUAN [place] 89, DO  Obstetrics & Gynecology PGY-3  7/22/2022  8:32 AM

## 2022-07-22 NOTE — PLAN OF CARE
Problem: ANTEPARTUM  Goal: Maintain pregnancy as long as maternal and/or fetal condition is stable  Description: INTERVENTIONS:  - Maternal surveillance  - Fetal surveillance  - Monitor uterine activity  - Medications as ordered  - Bedrest  Outcome: Progressing     Problem: PAIN - ADULT  Goal: Verbalizes/displays adequate comfort level or baseline comfort level  Description: Interventions:  - Encourage patient to monitor pain and request assistance  - Assess pain using appropriate pain scale  - Administer analgesics based on type and severity of pain and evaluate response  - Implement non-pharmacological measures as appropriate and evaluate response  - Consider cultural and social influences on pain and pain management  - Notify physician/advanced practitioner if interventions unsuccessful or patient reports new pain  Outcome: Progressing     Problem: INFECTION - ADULT  Goal: Absence or prevention of progression during hospitalization  Description: INTERVENTIONS:  - Assess and monitor for signs and symptoms of infection  - Monitor lab/diagnostic results  - Monitor all insertion sites, i e  indwelling lines, tubes, and drains  - Monitor endotracheal if appropriate and nasal secretions for changes in amount and color  - [place] appropriate cooling/warming therapies per order  - Administer medications as ordered  - Instruct and encourage patient and family to use good hand hygiene technique  - Identify and instruct in appropriate isolation precautions for identified infection/condition  Outcome: Progressing  Goal: Absence of fever/infection during neutropenic period  Description: INTERVENTIONS:  - Monitor WBC    Outcome: Progressing     Problem: SAFETY ADULT  Goal: Patient will remain free of falls  Description: INTERVENTIONS:  - Educate patient/family on patient safety including physical limitations  - Instruct patient to call for assistance with activity   - Consult OT/PT to assist with strengthening/mobility   - Keep Call bell within reach  - Keep bed low and locked with side rails adjusted as appropriate  - Keep care items and personal belongings within reach  - Initiate and maintain comfort rounds  - Make Fall Risk Sign visible to staff  - Offer Toileting every  Hours, in advance of need  - Initiate/Maintain alarm  - Obtain necessary fall risk management equipment:   - Apply yellow socks and bracelet for high fall risk patients  - Consider moving patient to room near nurses station  Outcome: Progressing  Goal: Maintain or return to baseline ADL function  Description: INTERVENTIONS:  -  Assess patient's ability to carry out ADLs; assess patient's baseline for ADL function and identify physical deficits which impact ability to perform ADLs (bathing, care of mouth/teeth, toileting, grooming, dressing, etc )  - Assess/evaluate cause of self-care deficits   - Assess range of motion  - Assess patient's mobility; develop plan if impaired  - Assess patient's need for assistive devices and provide as appropriate  - Encourage maximum independence but intervene and supervise when necessary  - Involve family in performance of ADLs  - Assess for home care needs following discharge   - Consider OT consult to assist with ADL evaluation and planning for discharge  - Provide patient education as appropriate  Outcome: Progressing  Goal: Maintains/Returns to pre admission functional level  Description: INTERVENTIONS:  - Perform BMAT or MOVE assessment daily    - Set and communicate daily mobility goal to care team and patient/family/caregiver  - Collaborate with rehabilitation services on mobility goals if consulted  - Perform Range of Motion  times a day  - Reposition patient every  hours    - Dangle patient  times a day  - Stand patient  times a day  - Ambulate patient  times a day  - Out of bed to chair  times a day   - Out of bed for meals  times a day  - Out of bed for toileting  - Record patient progress and toleration of activity level   Outcome: Progressing     Problem: Knowledge Deficit  Goal: Patient/family/caregiver demonstrates understanding of disease process, treatment plan, medications, and discharge instructions  Description: Complete learning assessment and assess knowledge base    Interventions:  - Provide teaching at level of understanding  - Provide teaching via preferred learning methods  Outcome: Progressing     Problem: DISCHARGE PLANNING  Goal: Discharge to home or other facility with appropriate resources  Description: INTERVENTIONS:  - Identify barriers to discharge w/patient and caregiver  - Arrange for needed discharge resources and transportation as appropriate  - Identify discharge learning needs (meds, wound care, etc )  - Arrange for interpretive services to assist at discharge as needed  - Refer to Case Management Department for coordinating discharge planning if the patient needs post-hospital services based on physician/advanced practitioner order or complex needs related to functional status, cognitive ability, or social support system  Outcome: Progressing

## 2022-07-23 PROCEDURE — 99232 SBSQ HOSP IP/OBS MODERATE 35: CPT | Performed by: OBSTETRICS & GYNECOLOGY

## 2022-07-23 PROCEDURE — 59025 FETAL NON-STRESS TEST: CPT | Performed by: OBSTETRICS & GYNECOLOGY

## 2022-07-23 RX ORDER — ACETAMINOPHEN 325 MG/1
650 TABLET ORAL EVERY 6 HOURS PRN
Status: DISCONTINUED | OUTPATIENT
Start: 2022-07-23 | End: 2022-07-28

## 2022-07-23 RX ORDER — FERROUS SULFATE 325(65) MG
325 TABLET ORAL
Status: DISCONTINUED | OUTPATIENT
Start: 2022-07-23 | End: 2022-08-01 | Stop reason: HOSPADM

## 2022-07-23 RX ADMIN — AMOXICILLIN 250 MG: 250 CAPSULE ORAL at 13:57

## 2022-07-23 RX ADMIN — FAMOTIDINE 20 MG: 20 TABLET ORAL at 10:52

## 2022-07-23 RX ADMIN — DOCUSATE SODIUM 100 MG: 100 CAPSULE, LIQUID FILLED ORAL at 10:52

## 2022-07-23 RX ADMIN — ACETAMINOPHEN 650 MG: 325 TABLET ORAL at 06:10

## 2022-07-23 RX ADMIN — FERROUS SULFATE TAB 325 MG (65 MG ELEMENTAL FE) 325 MG: 325 (65 FE) TAB at 10:52

## 2022-07-23 RX ADMIN — AMOXICILLIN 250 MG: 250 CAPSULE ORAL at 05:48

## 2022-07-23 RX ADMIN — AMOXICILLIN 250 MG: 250 CAPSULE ORAL at 22:00

## 2022-07-23 RX ADMIN — FAMOTIDINE 20 MG: 20 TABLET ORAL at 18:22

## 2022-07-23 RX ADMIN — DOCUSATE SODIUM 100 MG: 100 CAPSULE, LIQUID FILLED ORAL at 18:22

## 2022-07-23 RX ADMIN — Medication 1 TABLET: at 10:52

## 2022-07-23 NOTE — PLAN OF CARE
Problem: ANTEPARTUM  Goal: Maintain pregnancy as long as maternal and/or fetal condition is stable  Description: INTERVENTIONS:  - Maternal surveillance  - Fetal surveillance  - Monitor uterine activity  - Medications as ordered  - Bedrest  Outcome: Progressing     Problem: PAIN - ADULT  Goal: Verbalizes/displays adequate comfort level or baseline comfort level  Description: Interventions:  - Encourage patient to monitor pain and request assistance  - Assess pain using appropriate pain scale  - Administer analgesics based on type and severity of pain and evaluate response  - Implement non-pharmacological measures as appropriate and evaluate response  - Consider cultural and social influences on pain and pain management  - Notify physician/advanced practitioner if interventions unsuccessful or patient reports new pain  Outcome: Progressing     Problem: INFECTION - ADULT  Goal: Absence or prevention of progression during hospitalization  Description: INTERVENTIONS:  - Assess and monitor for signs and symptoms of infection  - Monitor lab/diagnostic results  - Monitor all insertion sites, i e  indwelling lines, tubes, and drains  - Monitor endotracheal if appropriate and nasal secretions for changes in amount and color  - [place] appropriate cooling/warming therapies per order  - Administer medications as ordered  - Instruct and encourage patient and family to use good hand hygiene technique  - Identify and instruct in appropriate isolation precautions for identified infection/condition  Outcome: Progressing  Goal: Absence of fever/infection during neutropenic period  Description: INTERVENTIONS:  - Monitor WBC    Outcome: Progressing

## 2022-07-23 NOTE — PROGRESS NOTES
MFM Progress note   Maryan Olivares 23 y o  female MRN: [id number]  Unit/Bed#: -01 Encounter: [id number]    Assessment/Plan:    Ms Silvia Loving is a 23 y o  Mike Martyr at 29w0d, Hospital Day: 6, admitted with PPROM  Breech presentation  Assessment & Plan  Resolved      premature rupture of membranes with onset of labor more than 24 hours following rupture  Assessment & Plan  No contraindications to expectant management (labor, abruption, infection)  Latency abx (Azithromycin x1 + Ampicillin-->amoxicillin)  Betmethasone  and   Consider rescue course after 14 days and when delivery likely within the following week  NICU consult done at [facility] NICU aware, update consult PRN  Breech presentation ---> VTX  GBS negative  UCx <10k mixed rhonda  LEE ANN 18cm on growth scan   Type & Screen ordered q3 days standing  1 hr glucola can be done anytime after  (1 week out from BMZ)    * 28 weeks gestation of pregnancy  Assessment & Plan  Routine prenatal care inpatient  Complete breech by transvaginal ultrasound  Tdap done 22  Glucola should be done 1 week out from BMZ         Subjective:    Maryan Olivares had no current complaints  Overnight events: large gushes  Repeated speculum exam yesterday which confirmed rupture of membranes, however LEE ANN has still been normal  She is now vertex!  Will start oral iron for mild anemia    Objective:  BP 99/55 Comment: nurse Astrid ZHONG  notified  Pulse 80   Temp 98 2 °F (36 8 °C) (Oral)   Resp 16   Ht 6' (1 829 m)   Wt 81 6 kg (180 lb)   LMP 2021   SpO2 97%   BMI 24 41 kg/m²       Lab Results   Component Value Date    WBC 17 72 (H) 2022    HGB 10 7 (L) 2022    HCT 31 6 (L) 2022    MCV 94 2022     2022       Lab Results   Component Value Date    CALCIUM 7 2 (L) 2022    K 3 9 2022    CO2 20 (L) 2022     2022    BUN 5 2022    CREATININE 0 51 (L) 2022 FHT:  Baseline Rate: 135 bpm  Variability: Moderate 6-25 bpm  Accelerations: 15 x 15 or greater, At variable times  Decelerations: None  FHR Category: Category I    TOCO:   Contraction Frequency (minutes): 0  Contraction Duration (seconds): n/a  Contraction Quality: Not applicable      General: Well appearing, no distress  Respiratory: Unlabored breathing  Cardiovascular: Regular rate  Abdomen: Soft, gravid, nontender  Fundal Height: Appropriate for gestational age  Pelvic: Defer  SVE:  Deferred  Extremities: Warm and well perfused  Non tender        Ze Mcintyre DO  7/23/2022  3:16 AM

## 2022-07-24 PROBLEM — Z3A.29 29 WEEKS GESTATION OF PREGNANCY: Status: ACTIVE | Noted: 2022-03-28

## 2022-07-24 PROCEDURE — 86850 RBC ANTIBODY SCREEN: CPT | Performed by: OBSTETRICS & GYNECOLOGY

## 2022-07-24 PROCEDURE — 59025 FETAL NON-STRESS TEST: CPT | Performed by: OBSTETRICS & GYNECOLOGY

## 2022-07-24 PROCEDURE — 99232 SBSQ HOSP IP/OBS MODERATE 35: CPT | Performed by: OBSTETRICS & GYNECOLOGY

## 2022-07-24 PROCEDURE — 86900 BLOOD TYPING SEROLOGIC ABO: CPT | Performed by: OBSTETRICS & GYNECOLOGY

## 2022-07-24 PROCEDURE — 86901 BLOOD TYPING SEROLOGIC RH(D): CPT | Performed by: OBSTETRICS & GYNECOLOGY

## 2022-07-24 RX ADMIN — Medication 1 TABLET: at 08:55

## 2022-07-24 RX ADMIN — AMOXICILLIN 250 MG: 250 CAPSULE ORAL at 06:02

## 2022-07-24 RX ADMIN — DOCUSATE SODIUM 100 MG: 100 CAPSULE, LIQUID FILLED ORAL at 08:56

## 2022-07-24 RX ADMIN — FAMOTIDINE 20 MG: 20 TABLET ORAL at 18:15

## 2022-07-24 RX ADMIN — DOCUSATE SODIUM 100 MG: 100 CAPSULE, LIQUID FILLED ORAL at 18:14

## 2022-07-24 RX ADMIN — FERROUS SULFATE TAB 325 MG (65 MG ELEMENTAL FE) 325 MG: 325 (65 FE) TAB at 08:56

## 2022-07-24 RX ADMIN — AMOXICILLIN 250 MG: 250 CAPSULE ORAL at 22:03

## 2022-07-24 RX ADMIN — FAMOTIDINE 20 MG: 20 TABLET ORAL at 08:56

## 2022-07-24 RX ADMIN — ACETAMINOPHEN 650 MG: 325 TABLET ORAL at 22:09

## 2022-07-24 RX ADMIN — AMOXICILLIN 250 MG: 250 CAPSULE ORAL at 14:26

## 2022-07-24 NOTE — PLAN OF CARE
Problem: ANTEPARTUM  Goal: Maintain pregnancy as long as maternal and/or fetal condition is stable  Description: INTERVENTIONS:  - Maternal surveillance  - Fetal surveillance  - Monitor uterine activity  - Medications as ordered  - Bedrest  Outcome: Progressing     Problem: PAIN - ADULT  Goal: Verbalizes/displays adequate comfort level or baseline comfort level  Description: Interventions:  - Encourage patient to monitor pain and request assistance  - Assess pain using appropriate pain scale  - Administer analgesics based on type and severity of pain and evaluate response  - Implement non-pharmacological measures as appropriate and evaluate response  - Consider cultural and social influences on pain and pain management  - Notify physician/advanced practitioner if interventions unsuccessful or patient reports new pain  Outcome: Progressing

## 2022-07-24 NOTE — PLAN OF CARE
Problem: ANTEPARTUM  Goal: Maintain pregnancy as long as maternal and/or fetal condition is stable  Description: INTERVENTIONS:  - Maternal surveillance  - Fetal surveillance  - Monitor uterine activity  - Medications as ordered  - Bedrest  Outcome: Progressing     Problem: PAIN - ADULT  Goal: Verbalizes/displays adequate comfort level or baseline comfort level  Description: Interventions:  - Encourage patient to monitor pain and request assistance  - Assess pain using appropriate pain scale  - Administer analgesics based on type and severity of pain and evaluate response  - Implement non-pharmacological measures as appropriate and evaluate response  - Consider cultural and social influences on pain and pain management  - Notify physician/advanced practitioner if interventions unsuccessful or patient reports new pain  Outcome: Progressing     Problem: INFECTION - ADULT  Goal: Absence or prevention of progression during hospitalization  Description: INTERVENTIONS:  - Assess and monitor for signs and symptoms of infection  - Monitor lab/diagnostic results  - Monitor all insertion sites, i e  indwelling lines, tubes, and drains  - Monitor endotracheal if appropriate and nasal secretions for changes in amount and color  - [place] appropriate cooling/warming therapies per order  - Administer medications as ordered  - Instruct and encourage patient and family to use good hand hygiene technique  - Identify and instruct in appropriate isolation precautions for identified infection/condition  Outcome: Progressing     Problem: SAFETY ADULT  Goal: Patient will remain free of falls  Description: INTERVENTIONS:  - Educate patient/family on patient safety including physical limitations  - Instruct patient to call for assistance with activity   - Consult OT/PT to assist with strengthening/mobility   - Keep Call bell within reach  - Keep bed low and locked with side rails adjusted as appropriate  - Keep care items and personal belongings within reach  - Initiate and maintain comfort rounds  - Make Fall Risk Sign visible to staff  - Apply yellow socks and bracelet for high fall risk patients  - Consider moving patient to room near nurses station  Outcome: Progressing  Goal: Maintain or return to baseline ADL function  Description: INTERVENTIONS:  -  Assess patient's ability to carry out ADLs; assess patient's baseline for ADL function and identify physical deficits which impact ability to perform ADLs (bathing, care of mouth/teeth, toileting, grooming, dressing, etc )  - Assess/evaluate cause of self-care deficits   - Assess range of motion  - Assess patient's mobility; develop plan if impaired  - Assess patient's need for assistive devices and provide as appropriate  - Encourage maximum independence but intervene and supervise when necessary  - Involve family in performance of ADLs  - Assess for home care needs following discharge   - Consider OT consult to assist with ADL evaluation and planning for discharge  - Provide patient education as appropriate  Outcome: Progressing  Goal: Maintains/Returns to pre admission functional level  Description: INTERVENTIONS:  - Perform BMAT or MOVE assessment daily    - Set and communicate daily mobility goal to care team and patient/family/caregiver  - Collaborate with rehabilitation services on mobility goals if consulted  - Out of bed for toileting  - Record patient progress and toleration of activity level   Outcome: Progressing     Problem: Knowledge Deficit  Goal: Patient/family/caregiver demonstrates understanding of disease process, treatment plan, medications, and discharge instructions  Description: Complete learning assessment and assess knowledge base    Interventions:  - Provide teaching at level of understanding  - Provide teaching via preferred learning methods  Outcome: Progressing     Problem: DISCHARGE PLANNING  Goal: Discharge to home or other facility with appropriate resources  Description: INTERVENTIONS:  - Identify barriers to discharge w/patient and caregiver  - Arrange for needed discharge resources and transportation as appropriate  - Identify discharge learning needs (meds, wound care, etc )  - Arrange for interpretive services to assist at discharge as needed  - Refer to Case Management Department for coordinating discharge planning if the patient needs post-hospital services based on physician/advanced practitioner order or complex needs related to functional status, cognitive ability, or social support system  Outcome: Progressing     Problem: INFECTION - ADULT  Goal: Absence of fever/infection during neutropenic period  Description: INTERVENTIONS:  - Monitor WBC    Outcome: Completed

## 2022-07-24 NOTE — PROGRESS NOTES
OBGYN Progress Note - Antepartum  Danella Doing 23 y o  female MRN: [id number]  Unit/Bed#: -01 Encounter: [id number]    Assessment/Plan:  23 y o  Patrciia Banda at 29w1d, admitted for inpatient management of pre term, premature rupture of membranes  By problem:    29 weeks gestation of pregnancy  Assessment & Plan  1  Tdap done 22  GBS negative  Daily prenatal vitamins  2  Status post betamethasone for fetal lung maturation  Diabetes screening ordered for 1 week after completing Betamethasone ()  3  Status post Neonatology consult  4  Status post magnesium for fetal neuroprotection  5  Last growth scan on  with estimated fetal weight of 1378 g (72%)  6  NST 3 times daily  7  Reassess fetal presentation as needed    *  premature rupture of membranes with onset of labor more than 24 hours following rupture  Assessment & Plan  1  Today is day 7 of latency antibiotics  2  Continue inpatient observation  Type and screen reordered    No DVT chemoprophylaxis  Encourage ambulation  Subjective/Objective   Chief Complaint: I feel well    Subjective:   Contractions: no  Loss of fluid: yes  Vaginal bleeding: no  Fetal movement: yes    Pain: no  Tolerating PO: yes  Voiding: yes  Ambulating: no  Headaches: no  Visual changes: no  Chest pain: no  Shortness of breath: no  Nausea: no  Vomiting/Diarrhea: no  Dysuria: no  Leg pain: no  Fevers/chills: no    Objective:   Vitals:   Temp:  [97 7 °F (36 5 °C)-98 8 °F (37 1 °C)] 98 4 °F (36 9 °C)  HR:  [78-95] 93  Resp:  [18] 18  BP: ()/(61-69) 95/65     Physical Exam  Vitals reviewed  Constitutional:       General: She is not in acute distress  Appearance: Normal appearance  She is not ill-appearing or toxic-appearing  Cardiovascular:      Rate and Rhythm: Normal rate and regular rhythm  Pulses: Normal pulses  Pulmonary:      Effort: Pulmonary effort is normal  No respiratory distress  Breath sounds: Normal breath sounds   No wheezing, rhonchi or rales  Abdominal:      General: There is no distension  Palpations: Abdomen is soft  Tenderness: There is no abdominal tenderness  There is no guarding or rebound  Comments: Gravid   Musculoskeletal:         General: No tenderness  Skin:     General: Skin is warm and dry  Neurological:      General: No focal deficit present  Mental Status: She is alert and oriented to person, place, and time  Psychiatric:         Mood and Affect: Mood normal          Behavior: Behavior normal          Fetal Assessment:  FHT: 145 / Marked >25 bpm / 15x15 accelerations / variable decelerations, reactive  [place]: no contractions    Lab, Imaging and other studies: I have personally reviewed pertinent reports  Lab Results   Component Value Date    WBC 17 72 (H) 07/18/2022    HGB 10 7 (L) 07/18/2022    HCT 31 6 (L) 07/18/2022    MCV 94 07/18/2022     07/18/2022       Lab Results   Component Value Date    CALCIUM 7 2 (L) 07/18/2022    K 3 9 07/18/2022    CO2 20 (L) 07/18/2022     07/18/2022    BUN 5 07/18/2022    CREATININE 0 51 (L) 07/18/2022       Lab Results   Component Value Date    ALT 11 07/18/2022    AST 14 07/18/2022    ALKPHOS 96 07/18/2022       Sindy Cruz MD  PGY II, OB/GYN  7/24/2022, 5:39 AM

## 2022-07-25 LAB
DEPRECATED AT III PPP: 90 % OF NORMAL (ref 92–136)
PROT C AG ACT/NOR PPP IA: 85 % OF NORMAL (ref 60–150)
PROT S ACT/NOR PPP: 33 % (ref 68–108)

## 2022-07-25 PROCEDURE — NC001 PR NO CHARGE: Performed by: OBSTETRICS & GYNECOLOGY

## 2022-07-25 PROCEDURE — 81240 F2 GENE: CPT | Performed by: OBSTETRICS & GYNECOLOGY

## 2022-07-25 PROCEDURE — 85300 ANTITHROMBIN III ACTIVITY: CPT | Performed by: OBSTETRICS & GYNECOLOGY

## 2022-07-25 PROCEDURE — 81241 F5 GENE: CPT | Performed by: OBSTETRICS & GYNECOLOGY

## 2022-07-25 PROCEDURE — 85303 CLOT INHIBIT PROT C ACTIVITY: CPT | Performed by: OBSTETRICS & GYNECOLOGY

## 2022-07-25 PROCEDURE — 85306 CLOT INHIBIT PROT S FREE: CPT | Performed by: OBSTETRICS & GYNECOLOGY

## 2022-07-25 RX ADMIN — AMOXICILLIN 250 MG: 250 CAPSULE ORAL at 06:15

## 2022-07-25 RX ADMIN — DOCUSATE SODIUM 100 MG: 100 CAPSULE, LIQUID FILLED ORAL at 18:16

## 2022-07-25 RX ADMIN — FAMOTIDINE 20 MG: 20 TABLET ORAL at 18:16

## 2022-07-25 RX ADMIN — DOCUSATE SODIUM 100 MG: 100 CAPSULE, LIQUID FILLED ORAL at 09:55

## 2022-07-25 RX ADMIN — FAMOTIDINE 20 MG: 20 TABLET ORAL at 09:55

## 2022-07-25 RX ADMIN — FERROUS SULFATE TAB 325 MG (65 MG ELEMENTAL FE) 325 MG: 325 (65 FE) TAB at 07:40

## 2022-07-25 RX ADMIN — Medication 1 TABLET: at 09:55

## 2022-07-25 NOTE — ASSESSMENT & PLAN NOTE
1  Labs drawn 7/25 - may indicate Protein S Deficiency  2  Dad w/ unprovoked DVT on lifelong anticoagulation  3  Per ACOG practice bulletin 197, Protein S deficiency is subject to substantial variability because of fluctuating levels of binding protein in pregnancy  Screening in non pregnant woman is more reliable, and testing should be deferred until remote from birth  Therefore, would recommend postpartum testing

## 2022-07-25 NOTE — UTILIZATION REVIEW
Continued Stay Review    Date: 7/25/2022                          Current Patient Class: inpatient    Current Level of Care: L&D med surg     HPI:26 y o  female initially admitted on  7/18/2022     Assessment/Plan: 29w2d, Hospital Day: 8, admit for inpatient management of pre term, premature rupture of membranes  Cont Inpatient observation; Type & screen Q 3 days; no contra indiaction to expectant management w/ plan for delivery @ 34 WKs   S/p latency Antibx,  BTM, IV MAG, last growth scan on 7/19 for estim fetal wt= 1378 GM, NST TID  Reassess fetal presentation as needed; Labs drawn due to info for Father w unprovoked DVT on lifelong AC     Overnight events: leaking has slowed   White count trending up  FHT:  Baseline Rate: 145 bpm  Variability: Moderate 6-25 bpm  Accelerations: 15 x 15 or greater  Decelerations: None  FHR Category: Category I  Vital Signs: Objective:  BP 90/54 (BP Location: Right arm)   Pulse 93   Temp 98 1 °F (36 7 °C) (Oral)   Resp 18   Ht 6' (1 829 m)   Wt 81 6 kg (180 lb)   LMP 12/25/2021   SpO2 96%    Pertinent Labs/Diagnostic Results:       Results from last 7 days   Lab Units 07/18/22 2134 07/18/22  1350   WBC Thousand/uL 17 72* 16 48*   HEMOGLOBIN g/dL 10 7* 11 3*   HEMATOCRIT % 31 6* 33 2*   PLATELETS Thousands/uL 244 222         Results from last 7 days   Lab Units 07/18/22 2134 07/18/22  1350   SODIUM mmol/L 133* 138   POTASSIUM mmol/L 3 9 3 5   CHLORIDE mmol/L 103 104   CO2 mmol/L 20* 23   ANION GAP mmol/L 10 11   BUN mg/dL 5 5   CREATININE mg/dL 0 51* 0 57*   EGFR ml/min/1 73sq m 134 129   CALCIUM mg/dL 7 2* 8 3     Results from last 7 days   Lab Units 07/18/22 2134 07/18/22  1350   AST U/L 14 14   ALT U/L 11 20   ALK PHOS U/L 96 110   TOTAL PROTEIN g/dL 6 1* 6 7   ALBUMIN g/dL 3 2* 2 8*   TOTAL BILIRUBIN mg/dL 0 77 0 51         Results from last 7 days   Lab Units 07/18/22 2134 07/18/22  1350   GLUCOSE RANDOM mg/dL 189* 82             No results found for: BETA-HYDROXYBUTYRATE           Medications:   Scheduled Medications:  amoxicillin, 250 mg, Oral, Q8H [place]  docusate sodium, 100 mg, Oral, BID  famotidine, 20 mg, Oral, BID  ferrous sulfate, 325 mg, Oral, Daily With Breakfast  prenatal multivitamin, 1 tablet, Oral, Daily  Continuous IV Infusions:     PRN Meds:  acetaminophen, 650 mg, Oral, Q6H PRN  calcium carbonate, 1,000 mg, Oral, BID PRN  diphenhydrAMINE, 25 mg, Oral, Q6H PRN  melatonin, 3 mg, Oral, HS PRN  polyethylene glycol, 17 g, Oral, Daily PRN  simethicone, 80 mg, Oral, Q6H PRN      Discharge Plan: D    Network Utilization Review Department  ATTENTION: Please call with any questions or concerns to [phone] and carefully listen to the prompts so that you are directed to the right person  All voicemails are confidential   Candy Skipper all requests for admission clinical reviews, approved or denied determinations and any other requests to dedicated fax number below belonging to the campus where the patient is receiving treatment   List of dedicated fax numbers for the Facilities:  [address] DENIALS (Administrative/Medical Necessity) [phone]   [address] (Maternity/NICU/Pediatrics) [phone]   [address]  [address] Medical [place] [address] [address] [place] [address] [address]aya [address]ajal [address] [facility] [facility] [phone]

## 2022-07-25 NOTE — PROGRESS NOTES
MFM Progress note   Malcolm Kasper 23 y o  female MRN: [id number]  Unit/Bed#: -01 Encounter: [id number]    Assessment/Plan:    Ms Carmen Trevino is a 23 y o  Nils Linea at [facility] Day: 8, admitted for inpatient management of pre term, premature rupture of membranes  By problem:  Breech presentation  Assessment & Plan  Resolved     Family history of deep vein thrombosis  Assessment & Plan  1  Labs drawn   2  Dad w/ unprovoked DVT on lifelong anticoagulation    29 weeks gestation of pregnancy  Assessment & Plan  1  Tdap done 22  GBS negative  Daily prenatal vitamins  2  Status post betamethasone for fetal lung maturation  Diabetes screening ordered for 1 week after completing Betamethasone ()  3  Status post Neonatology consult  4  Status post magnesium for fetal neuroprotection  5  Last growth scan on  with estimated fetal weight of 1378 g (72%)  6  NST 3 times daily  7  Reassess fetal presentation as needed        *  premature rupture of membranes with onset of labor more than 24 hours following rupture  Assessment & Plan  1  Completed latency antibiotics  2  Continue inpatient observation  3  Type and screen q3 days  4  No contraindications to expectant management (labor, abruption, infection)               Subjective:    Malcolm Kasper had no current complaints  Overnight events: leaking has slowed  White count trending up   Asked father about unprovoked clot yesterday, labs drawn this AM  His testing was all wnl      Objective:  BP 90/54 (BP Location: Right arm)   Pulse 93   Temp 98 1 °F (36 7 °C) (Oral)   Resp 18   Ht 6' (1 829 m)   Wt 81 6 kg (180 lb)   LMP 2021   SpO2 96%   BMI 24 41 kg/m²       Lab Results   Component Value Date    WBC 17 72 (H) 2022    HGB 10 7 (L) 2022    HCT 31 6 (L) 2022    MCV 94 2022     2022       Lab Results   Component Value Date    CALCIUM 7 2 (L) 2022    K 3 9 2022    CO2 20 (L) 07/18/2022     07/18/2022    BUN 5 07/18/2022    CREATININE 0 51 (L) 07/18/2022         FHT:  Baseline Rate: 145 bpm  Variability: Moderate 6-25 bpm  Accelerations: 15 x 15 or greater  Decelerations: None  FHR Category: Category I    TOCO:   Contraction Frequency (minutes): 0  Contraction Duration (seconds): 0  Contraction Quality: Not applicable      General: Well appearing, no distress  Respiratory: Unlabored breathing  Cardiovascular: Regular rate  Abdomen: Soft, gravid, nontender  Fundal Height: Appropriate for gestational age  Pelvic: Deferred  SVE:  Deferred  Extremities: Warm and well perfused  Non tender        Ghanshyam Diamond DO  7/25/2022  6:43 AM

## 2022-07-26 LAB — GLUCOSE 1H P 50 G GLC PO SERPL-MCNC: 125 MG/DL (ref 40–134)

## 2022-07-26 PROCEDURE — 59025 FETAL NON-STRESS TEST: CPT | Performed by: OBSTETRICS & GYNECOLOGY

## 2022-07-26 PROCEDURE — NC001 PR NO CHARGE: Performed by: OBSTETRICS & GYNECOLOGY

## 2022-07-26 PROCEDURE — 82950 GLUCOSE TEST: CPT | Performed by: OBSTETRICS & GYNECOLOGY

## 2022-07-26 PROCEDURE — 99231 SBSQ HOSP IP/OBS SF/LOW 25: CPT | Performed by: OBSTETRICS & GYNECOLOGY

## 2022-07-26 RX ADMIN — FAMOTIDINE 20 MG: 20 TABLET ORAL at 18:09

## 2022-07-26 RX ADMIN — FERROUS SULFATE TAB 325 MG (65 MG ELEMENTAL FE) 325 MG: 325 (65 FE) TAB at 07:37

## 2022-07-26 RX ADMIN — FAMOTIDINE 20 MG: 20 TABLET ORAL at 09:40

## 2022-07-26 RX ADMIN — Medication 1 TABLET: at 09:40

## 2022-07-26 RX ADMIN — DOCUSATE SODIUM 100 MG: 100 CAPSULE, LIQUID FILLED ORAL at 09:40

## 2022-07-26 RX ADMIN — DOCUSATE SODIUM 100 MG: 100 CAPSULE, LIQUID FILLED ORAL at 18:09

## 2022-07-26 RX ADMIN — ACETAMINOPHEN 650 MG: 325 TABLET ORAL at 02:32

## 2022-07-26 NOTE — PROGRESS NOTES
MFM Progress note   Fernando Archuleta 23 y o  female MRN: [id number]  Unit/Bed#: -01 Encounter: [id number]    Assessment/Plan:    Ms Hattie Draper is a 23 y o  Lenn Sprawls at [address] Day: 5, admitted for inpatient management of pre term, premature rupture of membranes  By problem:  Breech presentation  Assessment & Plan  Resolved     Family history of deep vein thrombosis  Assessment & Plan  1  Labs drawn  - may indicate Protein S Deficiency  2  Dad w/ unprovoked DVT on lifelong anticoagulation  3  Per ACOG practice bulletin 197, Protein S deficiency is subject to substantial variability because of fluctuating levels of binding protein in pregnancy  Screening in non pregnant woman is more reliable, and testing should be deferred until remote from birth  Therefore, would recommend postpartum testing  29 weeks gestation of pregnancy  Assessment & Plan  1  Tdap done 22  GBS negative  Daily prenatal vitamins  2  Status post betamethasone for fetal lung maturation  Diabetes screening ordered for 1 week after completing Betamethasone ()  3  Status post Neonatology consult  4  Status post magnesium for fetal neuroprotection  5  Last growth scan on  with estimated fetal weight of 1378 g (72%)  6  NST 3 times daily  7  Reassess fetal presentation as needed        *  premature rupture of membranes with onset of labor more than 24 hours following rupture  Assessment & Plan  1  Completed latency antibiotics  2  Continue inpatient observation  3  Type and screen q3 days  4  No contraindications to expectant management (labor, abruption, infection)               Subjective:    Fernando Archuleta had no current complaints  Overnight events: none  She is for 1 hr glucola today  Was able to walk some laps around the unit yesterday        Objective:  BP 93/55   Pulse 87   Temp 98 6 °F (37 °C) (Oral)   Resp 20   Ht 6' (1 829 m)   Wt 81 6 kg (180 lb)   LMP 2021   SpO2 97%   BMI 24 41 kg/m²       Lab Results   Component Value Date    WBC 17 72 (H) 07/18/2022    HGB 10 7 (L) 07/18/2022    HCT 31 6 (L) 07/18/2022    MCV 94 07/18/2022     07/18/2022       Lab Results   Component Value Date    CALCIUM 7 2 (L) 07/18/2022    K 3 9 07/18/2022    CO2 20 (L) 07/18/2022     07/18/2022    BUN 5 07/18/2022    CREATININE 0 51 (L) 07/18/2022         FHT:  Baseline Rate: 145 bpm  Variability: Moderate 6-25 bpm  Accelerations: 15 x 15 or greater  Decelerations: None  FHR Category: Category II    TOCO:   Contraction Frequency (minutes): 0  Contraction Duration (seconds): 0  Contraction Quality: Not applicable      General: Well appearing, no distress  Respiratory: Unlabored breathing  Cardiovascular: Regular rate  Abdomen: Soft, gravid, nontender  Fundal Height: Appropriate for gestational age  Pelvic: Deferred  SVE:  Deferred  Extremities: Warm and well perfused  Non tender        Lea Nicholass, DO  7/26/2022  6:09 AM

## 2022-07-26 NOTE — UTILIZATION REVIEW
Continued Stay Review    Date: 07-26-22                          Current Patient Class: *inpatient  Current Level of Care: medical    HPI:26 y o  female initially admitted on 07-18-22     Assessment/Plan: HD # 9  PPROM @ 29 3/7 weeks  S/p MGSO4 for neuro protection  Last dose BTM 07-26-22  NST TID, Completed latency antibiotics continues to leak small amount fluid  denies contraction,cramping or bleeding  (+) fetal movement Plan for inpatient management until delivery at 34 weeks, unless clinically indicated sooner  FHT:  Baseline Rate: 145 bpm  Variability: Moderate 6-25 bpm  Accelerations: 15 x 15 or greater  Decelerations: None  FHR Category: Category II     TOCO:   Contraction Frequency (minutes): 0  Contraction Duration (seconds): 0  Contraction Quality: Not applicable       Vital Signs:   Date/Time Temp Pulse Resp BP SpO2 O2 Device Cardiac (WDL) Patient Position - Orthostatic VS   07/26/22 0733 97 8 °F (36 6 °C) 89 20 97/63 98 % -- -- --   Comment rows:   OBSERV: according to the patient, baby is active and denies abdominal pain  at 07/26/22 0733   07/26/22 0500 98 6 °F (37 °C) -- -- -- -- -- -- --   07/26/22 0000 98 °F (36 7 °C) 87 20 93/55 -- -- -- --   07/25/22 2000 98 1 °F (36 7 °C) -- -- -- -- -- -- --   07/25/22 1950 -- -- -- -- -- None (Room air) WDL --   Comment rows:   OBSERV: pt returned from her walk; discussed AM glucose test with pt   Per pt feels frequent fetal movement; no s&s of labor, denies vaginal bleeding at 07/25/22 1950   07/25/22 1850 -- -- -- -- -- -- -- --   Comment rows:   07/25/22 1606 98 2 °F (36 8 °C) 101 20 107/54 97 % -- -- --   07/25/22 1240 -- -- -- -- -- -- -- --   Comment rows:   07/25/22 1220 98 1 °F (36 7 °C) -- -- -- -- -- -- --   07/25/22 0956 97 8 °F (36 6 °C) 98 20 104/59 -- --           Pertinent Labs/Diagnostic Results:     Medications:   Scheduled Medications:  docusate sodium, 100 mg, Oral, BID  famotidine, 20 mg, Oral, BID  ferrous sulfate, 325 mg, Oral, Daily With Breakfast  prenatal multivitamin, 1 tablet, Oral, Daily      Continuous IV Infusions:     PRN Meds:  acetaminophen, 650 mg, Oral, Q6H PRN  calcium carbonate, 1,000 mg, Oral, BID PRN  diphenhydrAMINE, 25 mg, Oral, Q6H PRN  melatonin, 3 mg, Oral, HS PRN  polyethylene glycol, 17 g, Oral, Daily PRN  simethicone, 80 mg, Oral, Q6H PRN        Discharge Plan: home after delivery or when medically cleared      Network Utilization Review Department  ATTENTION: Please call with any questions or concerns to [phone] and carefully listen to the prompts so that you are directed to the right person  All voicemails are confidential   [facility] all requests for admission clinical reviews, approved or denied determinations and any other requests to dedicated fax number below belonging to the campus where the patient is receiving treatment   List of dedicated fax numbers for the Facilities:  [address] DENIALS (Administrative/Medical Necessity) [phone]   [address] (Maternity/NICU/Pediatrics) [phone]   [address] [place] [address] [address] [place] [address]  [address] Medical [place] [address] [address] [place] [address] [address] Stephanie [address] [facility] [phone]

## 2022-07-26 NOTE — PLAN OF CARE
Problem: ANTEPARTUM  Goal: Maintain pregnancy as long as maternal and/or fetal condition is stable  Description: INTERVENTIONS:  - Maternal surveillance  - Fetal surveillance  - Monitor uterine activity  - Medications as ordered  - Bedrest  Outcome: Progressing     Problem: PAIN - ADULT  Goal: Verbalizes/displays adequate comfort level or baseline comfort level  Description: Interventions:  - Encourage patient to monitor pain and request assistance  - Assess pain using appropriate pain scale  - Administer analgesics based on type and severity of pain and evaluate response  - Implement non-pharmacological measures as appropriate and evaluate response  - Consider cultural and social influences on pain and pain management  - Notify physician/advanced practitioner if interventions unsuccessful or patient reports new pain  Outcome: Progressing     Problem: INFECTION - ADULT  Goal: Absence or prevention of progression during hospitalization  Description: INTERVENTIONS:  - Assess and monitor for signs and symptoms of infection  - Monitor lab/diagnostic results  - Monitor all insertion sites, i e  indwelling lines, tubes, and drains  - Monitor endotracheal if appropriate and nasal secretions for changes in amount and color  - [place] appropriate cooling/warming therapies per order  - Administer medications as ordered  - Instruct and encourage patient and family to use good hand hygiene technique  - Identify and instruct in appropriate isolation precautions for identified infection/condition  Outcome: Progressing     Problem: SAFETY ADULT  Goal: Patient will remain free of falls  Description: INTERVENTIONS:  - Educate patient/family on patient safety including physical limitations  - Instruct patient to call for assistance with activity   - Consult OT/PT to assist with strengthening/mobility   - Keep Call bell within reach  - Keep bed low and locked with side rails adjusted as appropriate  - Keep care items and personal belongings within reach  - Initiate and maintain comfort rounds  - Make Fall Risk Sign visible to staff  - Apply yellow socks and bracelet for high fall risk patients  - Consider moving patient to room near nurses station  Outcome: Progressing  Goal: Maintain or return to baseline ADL function  Description: INTERVENTIONS:  -  Assess patient's ability to carry out ADLs; assess patient's baseline for ADL function and identify physical deficits which impact ability to perform ADLs (bathing, care of mouth/teeth, toileting, grooming, dressing, etc )  - Assess/evaluate cause of self-care deficits   - Assess range of motion  - Assess patient's mobility; develop plan if impaired  - Assess patient's need for assistive devices and provide as appropriate  - Encourage maximum independence but intervene and supervise when necessary  - Involve family in performance of ADLs  - Assess for home care needs following discharge   - Consider OT consult to assist with ADL evaluation and planning for discharge  - Provide patient education as appropriate  Outcome: Progressing  Goal: Maintains/Returns to pre admission functional level  Description: INTERVENTIONS:  - Perform BMAT or MOVE assessment daily    - Set and communicate daily mobility goal to care team and patient/family/caregiver  - Collaborate with rehabilitation services on mobility goals if consulted  - Out of bed for toileting  - Record patient progress and toleration of activity level   Outcome: Progressing     Problem: Knowledge Deficit  Goal: Patient/family/caregiver demonstrates understanding of disease process, treatment plan, medications, and discharge instructions  Description: Complete learning assessment and assess knowledge base    Interventions:  - Provide teaching at level of understanding  - Provide teaching via preferred learning methods  Outcome: Progressing     Problem: DISCHARGE PLANNING  Goal: Discharge to home or other facility with appropriate resources  Description: INTERVENTIONS:  - Identify barriers to discharge w/patient and caregiver  - Arrange for needed discharge resources and transportation as appropriate  - Identify discharge learning needs (meds, wound care, etc )  - Arrange for interpretive services to assist at discharge as needed  - Refer to Case Management Department for coordinating discharge planning if the patient needs post-hospital services based on physician/advanced practitioner order or complex needs related to functional status, cognitive ability, or social support system  Outcome: Progressing

## 2022-07-27 ENCOUNTER — ANESTHESIA EVENT (INPATIENT)
Dept: LABOR AND DELIVERY | Facility: HOSPITAL | Age: 27
End: 2022-07-27
Payer: COMMERCIAL

## 2022-07-27 ENCOUNTER — ANESTHESIA (INPATIENT)
Dept: LABOR AND DELIVERY | Facility: HOSPITAL | Age: 27
End: 2022-07-27
Payer: COMMERCIAL

## 2022-07-27 PROCEDURE — 76818 FETAL BIOPHYS PROFILE W/NST: CPT | Performed by: OBSTETRICS & GYNECOLOGY

## 2022-07-27 PROCEDURE — 86850 RBC ANTIBODY SCREEN: CPT | Performed by: OBSTETRICS & GYNECOLOGY

## 2022-07-27 PROCEDURE — 99232 SBSQ HOSP IP/OBS MODERATE 35: CPT | Performed by: OBSTETRICS & GYNECOLOGY

## 2022-07-27 PROCEDURE — NC001 PR NO CHARGE: Performed by: OBSTETRICS & GYNECOLOGY

## 2022-07-27 PROCEDURE — 86901 BLOOD TYPING SEROLOGIC RH(D): CPT | Performed by: OBSTETRICS & GYNECOLOGY

## 2022-07-27 PROCEDURE — 99231 SBSQ HOSP IP/OBS SF/LOW 25: CPT | Performed by: OBSTETRICS & GYNECOLOGY

## 2022-07-27 PROCEDURE — 76815 OB US LIMITED FETUS(S): CPT | Performed by: OBSTETRICS & GYNECOLOGY

## 2022-07-27 PROCEDURE — 86900 BLOOD TYPING SEROLOGIC ABO: CPT | Performed by: OBSTETRICS & GYNECOLOGY

## 2022-07-27 RX ORDER — CEFAZOLIN SODIUM 2 G/50ML
2000 SOLUTION INTRAVENOUS ONCE
Status: COMPLETED | OUTPATIENT
Start: 2022-07-27 | End: 2022-07-28

## 2022-07-27 RX ORDER — METOCLOPRAMIDE HYDROCHLORIDE 5 MG/ML
5 INJECTION INTRAMUSCULAR; INTRAVENOUS ONCE
Status: COMPLETED | OUTPATIENT
Start: 2022-07-27 | End: 2022-07-27

## 2022-07-27 RX ORDER — MAGNESIUM SULFATE HEPTAHYDRATE 40 MG/ML
2 INJECTION, SOLUTION INTRAVENOUS ONCE
Status: COMPLETED | OUTPATIENT
Start: 2022-07-27 | End: 2022-07-27

## 2022-07-27 RX ORDER — FAMOTIDINE 10 MG/ML
20 INJECTION, SOLUTION INTRAVENOUS ONCE
Status: COMPLETED | OUTPATIENT
Start: 2022-07-27 | End: 2022-07-27

## 2022-07-27 RX ORDER — TRISODIUM CITRATE DIHYDRATE AND CITRIC ACID MONOHYDRATE 500; 334 MG/5ML; MG/5ML
30 SOLUTION ORAL ONCE
Status: COMPLETED | OUTPATIENT
Start: 2022-07-27 | End: 2022-07-27

## 2022-07-27 RX ORDER — MAGNESIUM SULFATE HEPTAHYDRATE 40 MG/ML
4 INJECTION, SOLUTION INTRAVENOUS ONCE
Status: COMPLETED | OUTPATIENT
Start: 2022-07-27 | End: 2022-07-27

## 2022-07-27 RX ORDER — MAGNESIUM SULFATE HEPTAHYDRATE 40 MG/ML
2 INJECTION, SOLUTION INTRAVENOUS CONTINUOUS
Status: DISCONTINUED | OUTPATIENT
Start: 2022-07-27 | End: 2022-07-28

## 2022-07-27 RX ADMIN — FAMOTIDINE 20 MG: 20 TABLET ORAL at 08:26

## 2022-07-27 RX ADMIN — FAMOTIDINE 20 MG: 10 INJECTION, SOLUTION INTRAVENOUS at 23:29

## 2022-07-27 RX ADMIN — METOCLOPRAMIDE HYDROCHLORIDE 5 MG: 5 INJECTION INTRAMUSCULAR; INTRAVENOUS at 23:28

## 2022-07-27 RX ADMIN — DOCUSATE SODIUM 100 MG: 100 CAPSULE, LIQUID FILLED ORAL at 08:26

## 2022-07-27 RX ADMIN — MAGNESIUM SULFATE HEPTAHYDRATE 4 G: 40 INJECTION, SOLUTION INTRAVENOUS at 21:46

## 2022-07-27 RX ADMIN — FAMOTIDINE 20 MG: 20 TABLET ORAL at 17:55

## 2022-07-27 RX ADMIN — FERROUS SULFATE TAB 325 MG (65 MG ELEMENTAL FE) 325 MG: 325 (65 FE) TAB at 08:26

## 2022-07-27 RX ADMIN — Medication 1 TABLET: at 08:26

## 2022-07-27 RX ADMIN — MAGNESIUM SULFATE HEPTAHYDRATE 2 G: 40 INJECTION, SOLUTION INTRAVENOUS at 22:10

## 2022-07-27 RX ADMIN — DOCUSATE SODIUM 100 MG: 100 CAPSULE, LIQUID FILLED ORAL at 17:55

## 2022-07-27 RX ADMIN — ACETAMINOPHEN 650 MG: 325 TABLET ORAL at 17:55

## 2022-07-27 RX ADMIN — SODIUM CITRATE AND CITRIC ACID MONOHYDRATE 30 ML: 500; 334 SOLUTION ORAL at 23:28

## 2022-07-27 RX ADMIN — MAGNESIUM SULFATE HEPTAHYDRATE 2 G/HR: 40 INJECTION, SOLUTION INTRAVENOUS at 22:39

## 2022-07-27 NOTE — PROGRESS NOTES
Introduce myself to Justo Mcclelland as part of the Obstetrics team  She is 25 YO P0 @ 29w4d here with Pre-term Pre-labor ROM  There are no signs of infection  Baby is transverse, 1378 grams, LEE ANN 14, will continue to follow with MFM

## 2022-07-27 NOTE — PROCEDURES
Lea Zamora, a  at 38V9P with an GUIDO of 10/8/2022, by Ultrasound, was seen at [address] for the following procedure(s): $Procedure Type: LEE ANN, BPP with NST]         4 Quadrant LEE ANN  LEE ANN Q1 (cm): 3 1 cm  LEE ANN Q2 (cm): 4 6 cm  LEE ANN Q3 (cm): 2 3 cm  LEE ANN Q4 (cm): 3 8 cm  LEE ANN TOTAL (cm): 13 8 cm    Biophysical Profile  Fetal Breathin  Fetal Movement: 2  Fetal Tone: 2  Fluid Volume: 2  Nonstress Test: 2  Biophysical Profile Score (of 8): 8 /8  Biophysical Profile Score (of 10): 10 /10    Ultrasound Other  Fetal Presentation: Transverse    Photos to be scanned in through [address] Gynecology PGY-3  2022  1:21 PM

## 2022-07-27 NOTE — PLAN OF CARE
Problem: ANTEPARTUM  Goal: Maintain pregnancy as long as maternal and/or fetal condition is stable  Description: INTERVENTIONS:  - Maternal surveillance  - Fetal surveillance  - Monitor uterine activity  - Medications as ordered  - Bedrest  Outcome: Progressing     Problem: PAIN - ADULT  Goal: Verbalizes/displays adequate comfort level or baseline comfort level  Description: Interventions:  - Encourage patient to monitor pain and request assistance  - Assess pain using appropriate pain scale  - Administer analgesics based on type and severity of pain and evaluate response  - Implement non-pharmacological measures as appropriate and evaluate response  - Consider cultural and social influences on pain and pain management  - Notify physician/advanced practitioner if interventions unsuccessful or patient reports new pain  Outcome: Progressing     Problem: INFECTION - ADULT  Goal: Absence or prevention of progression during hospitalization  Description: INTERVENTIONS:  - Assess and monitor for signs and symptoms of infection  - Monitor lab/diagnostic results  - Monitor all insertion sites, i e  indwelling lines, tubes, and drains  - Monitor endotracheal if appropriate and nasal secretions for changes in amount and color  - [place] appropriate cooling/warming therapies per order  - Administer medications as ordered  - Instruct and encourage patient and family to use good hand hygiene technique  - Identify and instruct in appropriate isolation precautions for identified infection/condition  Outcome: Progressing     Problem: SAFETY ADULT  Goal: Patient will remain free of falls  Description: INTERVENTIONS:  - Educate patient/family on patient safety including physical limitations  - Instruct patient to call for assistance with activity   - Consult OT/PT to assist with strengthening/mobility   - Keep Call bell within reach  - Keep bed low and locked with side rails adjusted as appropriate  - Keep care items and personal belongings within reach  - Initiate and maintain comfort rounds  - Make Fall Risk Sign visible to staff  - Apply yellow socks and bracelet for high fall risk patients  - Consider moving patient to room near nurses station  Outcome: Progressing  Goal: Maintain or return to baseline ADL function  Description: INTERVENTIONS:  -  Assess patient's ability to carry out ADLs; assess patient's baseline for ADL function and identify physical deficits which impact ability to perform ADLs (bathing, care of mouth/teeth, toileting, grooming, dressing, etc )  - Assess/evaluate cause of self-care deficits   - Assess range of motion  - Assess patient's mobility; develop plan if impaired  - Assess patient's need for assistive devices and provide as appropriate  - Encourage maximum independence but intervene and supervise when necessary  - Involve family in performance of ADLs  - Assess for home care needs following discharge   - Consider OT consult to assist with ADL evaluation and planning for discharge  - Provide patient education as appropriate  Outcome: Progressing  Goal: Maintains/Returns to pre admission functional level  Description: INTERVENTIONS:  - Perform BMAT or MOVE assessment daily    - Set and communicate daily mobility goal to care team and patient/family/caregiver  - Collaborate with rehabilitation services on mobility goals if consulted  - Out of bed for toileting  - Record patient progress and toleration of activity level   Outcome: Progressing     Problem: Knowledge Deficit  Goal: Patient/family/caregiver demonstrates understanding of disease process, treatment plan, medications, and discharge instructions  Description: Complete learning assessment and assess knowledge base    Interventions:  - Provide teaching at level of understanding  - Provide teaching via preferred learning methods  Outcome: Progressing     Problem: DISCHARGE PLANNING  Goal: Discharge to home or other facility with appropriate resources  Description: INTERVENTIONS:  - Identify barriers to discharge w/patient and caregiver  - Arrange for needed discharge resources and transportation as appropriate  - Identify discharge learning needs (meds, wound care, etc )  - Arrange for interpretive services to assist at discharge as needed  - Refer to Case Management Department for coordinating discharge planning if the patient needs post-hospital services based on physician/advanced practitioner order or complex needs related to functional status, cognitive ability, or social support system  Outcome: Progressing

## 2022-07-27 NOTE — PLAN OF CARE
Problem: ANTEPARTUM  Goal: Maintain pregnancy as long as maternal and/or fetal condition is stable  Description: INTERVENTIONS:  - Maternal surveillance  - Fetal surveillance  - Monitor uterine activity  - Medications as ordered  - Bedrest  Outcome: Progressing     Problem: PAIN - ADULT  Goal: Verbalizes/displays adequate comfort level or baseline comfort level  Description: Interventions:  - Encourage patient to monitor pain and request assistance  - Assess pain using appropriate pain scale  - Administer analgesics based on type and severity of pain and evaluate response  - Implement non-pharmacological measures as appropriate and evaluate response  - Consider cultural and social influences on pain and pain management  - Notify physician/advanced practitioner if interventions unsuccessful or patient reports new pain  Outcome: Progressing     Problem: INFECTION - ADULT  Goal: Absence or prevention of progression during hospitalization  Description: INTERVENTIONS:  - Assess and monitor for signs and symptoms of infection  - Monitor lab/diagnostic results  - Monitor all insertion sites, i e  indwelling lines, tubes, and drains  - Monitor endotracheal if appropriate and nasal secretions for changes in amount and color  - [place] appropriate cooling/warming therapies per order  - Administer medications as ordered  - Instruct and encourage patient and family to use good hand hygiene technique  - Identify and instruct in appropriate isolation precautions for identified infection/condition  Outcome: Progressing     Problem: SAFETY ADULT  Goal: Patient will remain free of falls  Description: INTERVENTIONS:  - Educate patient/family on patient safety including physical limitations  - Instruct patient to call for assistance with activity   - Consult OT/PT to assist with strengthening/mobility   - Keep Call bell within reach  - Keep bed low and locked with side rails adjusted as appropriate  - Keep care items and personal belongings within reach  - Initiate and maintain comfort rounds  - Make Fall Risk Sign visible to staff  - Apply yellow socks and bracelet for high fall risk patients  - Consider moving patient to room near nurses station  Outcome: Progressing  Goal: Maintain or return to baseline ADL function  Description: INTERVENTIONS:  -  Assess patient's ability to carry out ADLs; assess patient's baseline for ADL function and identify physical deficits which impact ability to perform ADLs (bathing, care of mouth/teeth, toileting, grooming, dressing, etc )  - Assess/evaluate cause of self-care deficits   - Assess range of motion  - Assess patient's mobility; develop plan if impaired  - Assess patient's need for assistive devices and provide as appropriate  - Encourage maximum independence but intervene and supervise when necessary  - Involve family in performance of ADLs  - Assess for home care needs following discharge   - Consider OT consult to assist with ADL evaluation and planning for discharge  - Provide patient education as appropriate  Outcome: Progressing  Goal: Maintains/Returns to pre admission functional level  Description: INTERVENTIONS:  - Perform BMAT or MOVE assessment daily    - Set and communicate daily mobility goal to care team and patient/family/caregiver  - Collaborate with rehabilitation services on mobility goals if consulted  - Out of bed for toileting  - Record patient progress and toleration of activity level   Outcome: Progressing     Problem: Knowledge Deficit  Goal: Patient/family/caregiver demonstrates understanding of disease process, treatment plan, medications, and discharge instructions  Description: Complete learning assessment and assess knowledge base    Interventions:  - Provide teaching at level of understanding  - Provide teaching via preferred learning methods  Outcome: Progressing     Problem: DISCHARGE PLANNING  Goal: Discharge to home or other facility with appropriate resources  Description: INTERVENTIONS:  - Identify barriers to discharge w/patient and caregiver  - Arrange for needed discharge resources and transportation as appropriate  - Identify discharge learning needs (meds, wound care, etc )  - Arrange for interpretive services to assist at discharge as needed  - Refer to Case Management Department for coordinating discharge planning if the patient needs post-hospital services based on physician/advanced practitioner order or complex needs related to functional status, cognitive ability, or social support system  Outcome: Progressing

## 2022-07-27 NOTE — NURSING NOTE
Patient called RN into room  After ambulating to the bathroom, patient felt dizzy, nauseous and faint  Patient was able to ambulate back to bed safely  /72, patient states this is higher than her typical baseline, but still WNL  External U/S and TOCO applied  Dr Morena Roa made aware  [place] noted on monitor  Per Dr Morena Roa, keep patient on monitor for now and will reassess  Patient education provided, patient agreeable to plan  Will continue to monitor

## 2022-07-27 NOTE — PROGRESS NOTES
MFM Progress note   Eduardo Singleton 23 y o  female MRN: [id number]  Unit/Bed#: -01 Encounter: [id number]    Assessment/Plan:    Ms Leigh Mina is a 23 y o  Linda Mcguire at 32w2d, Hospital Day: 8, admitted with PPROM on   29 weeks gestation of pregnancy  Assessment & Plan  1  Tdap done 22  GBS negative  Daily prenatal vitamins  2  Status post betamethasone for fetal lung maturation  3  Status post Neonatology consult  4  Status post magnesium for fetal neuroprotection  5  Last growth scan on  with estimated fetal weight of 1378 g (72%)  6  NST 3 times daily  7  Reassess fetal presentation as needed  8  1 hr glucola WNL        Breech presentation  Assessment & Plan  Resolved     Family history of deep vein thrombosis  Assessment & Plan  1  Labs drawn  - may indicate Protein S Deficiency  2  Dad w/ unprovoked DVT on lifelong anticoagulation  3  Per [employer]G practice bulletin 197, Protein S deficiency is subject to substantial variability because of fluctuating levels of binding protein in pregnancy  Screening in non pregnant woman is more reliable, and testing should be deferred until remote from birth  Therefore, would recommend postpartum testing  *  premature rupture of membranes with onset of labor more than 24 hours following rupture  Assessment & Plan  1  Completed latency antibiotics  2  Continue inpatient observation  3  Type and screen q3 days  4  No contraindications to expectant management (labor, abruption, infection)             Subjective:    Eduardo Singleton had no current complaints  Overnight events: still leaking  She passed her glucose screening test this week  She has been taking walks around the unit  Denies fever/chills, abdominal tenderness  Will plan for LEE ANN later today        Objective:  BP 95/59 (BP Location: Left arm)   Pulse 88   Temp 97 9 °F (36 6 °C) (Oral)   Resp 18   Ht 6' (1 829 m)   Wt 81 6 kg (180 lb)   LMP 2021   SpO2 96%   BMI 24 41 kg/m²       Lab Results   Component Value Date    WBC 17 72 (H) 07/18/2022    HGB 10 7 (L) 07/18/2022    HCT 31 6 (L) 07/18/2022    MCV 94 07/18/2022     07/18/2022       Lab Results   Component Value Date    CALCIUM 7 2 (L) 07/18/2022    K 3 9 07/18/2022    CO2 20 (L) 07/18/2022     07/18/2022    BUN 5 07/18/2022    CREATININE 0 51 (L) 07/18/2022         FHT:  Baseline Rate: 135 bpm  Variability: Moderate 6-25 bpm  Accelerations: 15 x 15 or greater  Decelerations: None  FHR Category: Category I    TOCO:   Contraction Frequency (minutes): 0  Contraction Duration (seconds): 0  Contraction Quality: Not applicable      General: Well appearing, no distress  Respiratory: Unlabored breathing  Cardiovascular: Regular rate  Abdomen: Soft, gravid, nontender  Fundal Height: Appropriate for gestational age  Pelvic: Deferred  SVE:  Deferred  Extremities: Warm and well perfused  Non tender        Senthil Garrett DO  7/27/2022  5:45 AM

## 2022-07-28 LAB
BASE EXCESS BLDCOA CALC-SCNC: -0.2 MMOL/L (ref 3–11)
BASE EXCESS BLDCOV CALC-SCNC: -1.2 MMOL/L (ref 1–9)
BASOPHILS # BLD AUTO: 0.05 THOUSANDS/ΜL (ref 0–0.1)
BASOPHILS NFR BLD AUTO: 0 % (ref 0–1)
EOSINOPHIL # BLD AUTO: 0 THOUSAND/ΜL (ref 0–0.61)
EOSINOPHIL NFR BLD AUTO: 0 % (ref 0–6)
ERYTHROCYTE [DISTWIDTH] IN BLOOD BY AUTOMATED COUNT: 12.9 % (ref 11.6–15.1)
HCO3 BLDCOA-SCNC: 25.5 MMOL/L (ref 17.3–27.3)
HCO3 BLDCOV-SCNC: 21.1 MMOL/L (ref 12.2–28.6)
HCT VFR BLD AUTO: 26.1 % (ref 34.8–46.1)
HGB BLD-MCNC: 9.1 G/DL (ref 11.5–15.4)
IMM GRANULOCYTES # BLD AUTO: 0.38 THOUSAND/UL (ref 0–0.2)
IMM GRANULOCYTES NFR BLD AUTO: 2 % (ref 0–2)
LYMPHOCYTES # BLD AUTO: 2.08 THOUSANDS/ΜL (ref 0.6–4.47)
LYMPHOCYTES NFR BLD AUTO: 8 % (ref 14–44)
MCH RBC QN AUTO: 32.3 PG (ref 26.8–34.3)
MCHC RBC AUTO-ENTMCNC: 34.9 G/DL (ref 31.4–37.4)
MCV RBC AUTO: 93 FL (ref 82–98)
MONOCYTES # BLD AUTO: 1.4 THOUSAND/ΜL (ref 0.17–1.22)
MONOCYTES NFR BLD AUTO: 5 % (ref 4–12)
NEUTROPHILS # BLD AUTO: 22.22 THOUSANDS/ΜL (ref 1.85–7.62)
NEUTS SEG NFR BLD AUTO: 85 % (ref 43–75)
NRBC BLD AUTO-RTO: 0 /100 WBCS
O2 CT VFR BLDCOA CALC: 11.9 ML/DL
OXYHGB MFR BLDCOA: 53.5 %
OXYHGB MFR BLDCOV: 74.8 %
PCO2 BLDCOA: 45.4 MM[HG] (ref 30–60)
PCO2 BLDCOV: 29.3 MM HG (ref 27–43)
PH BLDCOA: 7.37 [PH] (ref 7.23–7.43)
PH BLDCOV: 7.48 [PH] (ref 7.19–7.49)
PLATELET # BLD AUTO: 235 THOUSANDS/UL (ref 149–390)
PMV BLD AUTO: 10.2 FL (ref 8.9–12.7)
PO2 BLDCOA: 22.3 MM HG (ref 5–25)
PO2 BLDCOV: 28.5 MM HG (ref 15–45)
RBC # BLD AUTO: 2.82 MILLION/UL (ref 3.81–5.12)
SAO2 % BLDCOV: 15.4 ML/DL
WBC # BLD AUTO: 26.13 THOUSAND/UL (ref 4.31–10.16)

## 2022-07-28 PROCEDURE — 59514 CESAREAN DELIVERY ONLY: CPT | Performed by: OBSTETRICS & GYNECOLOGY

## 2022-07-28 PROCEDURE — 85025 COMPLETE CBC W/AUTO DIFF WBC: CPT | Performed by: OBSTETRICS & GYNECOLOGY

## 2022-07-28 PROCEDURE — 88307 TISSUE EXAM BY PATHOLOGIST: CPT | Performed by: PATHOLOGY

## 2022-07-28 PROCEDURE — 82805 BLOOD GASES W/O2 SATURATION: CPT | Performed by: OBSTETRICS & GYNECOLOGY

## 2022-07-28 RX ORDER — DIAPER,BRIEF,INFANT-TODD,DISP
1 EACH MISCELLANEOUS DAILY PRN
Status: DISCONTINUED | OUTPATIENT
Start: 2022-07-28 | End: 2022-08-01 | Stop reason: HOSPADM

## 2022-07-28 RX ORDER — ONDANSETRON 2 MG/ML
4 INJECTION INTRAMUSCULAR; INTRAVENOUS EVERY 8 HOURS PRN
Status: DISCONTINUED | OUTPATIENT
Start: 2022-07-29 | End: 2022-08-01 | Stop reason: HOSPADM

## 2022-07-28 RX ORDER — DOCUSATE SODIUM 100 MG/1
100 CAPSULE, LIQUID FILLED ORAL 2 TIMES DAILY
Status: DISCONTINUED | OUTPATIENT
Start: 2022-07-28 | End: 2022-07-29 | Stop reason: SDUPTHER

## 2022-07-28 RX ORDER — ACETAMINOPHEN 325 MG/1
650 TABLET ORAL EVERY 6 HOURS SCHEDULED
Status: DISCONTINUED | OUTPATIENT
Start: 2022-07-28 | End: 2022-07-28

## 2022-07-28 RX ORDER — DEXAMETHASONE SODIUM PHOSPHATE 10 MG/ML
INJECTION, SOLUTION INTRAMUSCULAR; INTRAVENOUS AS NEEDED
Status: DISCONTINUED | OUTPATIENT
Start: 2022-07-28 | End: 2022-07-28

## 2022-07-28 RX ORDER — DIPHENHYDRAMINE HCL 25 MG
25 TABLET ORAL EVERY 6 HOURS PRN
Status: DISCONTINUED | OUTPATIENT
Start: 2022-07-29 | End: 2022-08-01 | Stop reason: HOSPADM

## 2022-07-28 RX ORDER — SIMETHICONE 80 MG
80 TABLET,CHEWABLE ORAL 4 TIMES DAILY PRN
Status: DISCONTINUED | OUTPATIENT
Start: 2022-07-28 | End: 2022-08-01 | Stop reason: HOSPADM

## 2022-07-28 RX ORDER — PROPOFOL 10 MG/ML
INJECTION, EMULSION INTRAVENOUS AS NEEDED
Status: DISCONTINUED | OUTPATIENT
Start: 2022-07-28 | End: 2022-07-28

## 2022-07-28 RX ORDER — ONDANSETRON 2 MG/ML
4 INJECTION INTRAMUSCULAR; INTRAVENOUS ONCE AS NEEDED
Status: DISCONTINUED | OUTPATIENT
Start: 2022-07-28 | End: 2022-08-01 | Stop reason: HOSPADM

## 2022-07-28 RX ORDER — METOCLOPRAMIDE HYDROCHLORIDE 5 MG/ML
5 INJECTION INTRAMUSCULAR; INTRAVENOUS EVERY 6 HOURS PRN
Status: ACTIVE | OUTPATIENT
Start: 2022-07-28 | End: 2022-07-29

## 2022-07-28 RX ORDER — MIDAZOLAM HYDROCHLORIDE 2 MG/2ML
INJECTION, SOLUTION INTRAMUSCULAR; INTRAVENOUS AS NEEDED
Status: DISCONTINUED | OUTPATIENT
Start: 2022-07-28 | End: 2022-07-28

## 2022-07-28 RX ORDER — MORPHINE SULFATE 1 MG/ML
INJECTION, SOLUTION EPIDURAL; INTRATHECAL; INTRAVENOUS AS NEEDED
Status: DISCONTINUED | OUTPATIENT
Start: 2022-07-28 | End: 2022-07-28

## 2022-07-28 RX ORDER — SODIUM CHLORIDE, SODIUM LACTATE, POTASSIUM CHLORIDE, CALCIUM CHLORIDE 600; 310; 30; 20 MG/100ML; MG/100ML; MG/100ML; MG/100ML
125 INJECTION, SOLUTION INTRAVENOUS CONTINUOUS
Status: DISCONTINUED | OUTPATIENT
Start: 2022-07-28 | End: 2022-07-31

## 2022-07-28 RX ORDER — ONDANSETRON 2 MG/ML
4 INJECTION INTRAMUSCULAR; INTRAVENOUS EVERY 4 HOURS PRN
Status: ACTIVE | OUTPATIENT
Start: 2022-07-28 | End: 2022-07-29

## 2022-07-28 RX ORDER — FENTANYL CITRATE 50 UG/ML
INJECTION, SOLUTION INTRAMUSCULAR; INTRAVENOUS AS NEEDED
Status: DISCONTINUED | OUTPATIENT
Start: 2022-07-28 | End: 2022-07-28

## 2022-07-28 RX ORDER — KETOROLAC TROMETHAMINE 30 MG/ML
INJECTION, SOLUTION INTRAMUSCULAR; INTRAVENOUS AS NEEDED
Status: DISCONTINUED | OUTPATIENT
Start: 2022-07-28 | End: 2022-07-28

## 2022-07-28 RX ORDER — KETOROLAC TROMETHAMINE 30 MG/ML
30 INJECTION, SOLUTION INTRAMUSCULAR; INTRAVENOUS EVERY 6 HOURS
Status: DISPENSED | OUTPATIENT
Start: 2022-07-28 | End: 2022-07-29

## 2022-07-28 RX ORDER — HYDROMORPHONE HCL/PF 1 MG/ML
0.5 SYRINGE (ML) INJECTION
Status: DISCONTINUED | OUTPATIENT
Start: 2022-07-28 | End: 2022-08-01 | Stop reason: HOSPADM

## 2022-07-28 RX ORDER — HYDROXYZINE HYDROCHLORIDE 25 MG/1
25 TABLET, FILM COATED ORAL EVERY 6 HOURS PRN
Status: DISCONTINUED | OUTPATIENT
Start: 2022-07-28 | End: 2022-08-01 | Stop reason: HOSPADM

## 2022-07-28 RX ORDER — NALBUPHINE HCL 10 MG/ML
10 AMPUL (ML) INJECTION
Status: DISCONTINUED | OUTPATIENT
Start: 2022-07-28 | End: 2022-08-01 | Stop reason: HOSPADM

## 2022-07-28 RX ORDER — CALCIUM CARBONATE 200(500)MG
1000 TABLET,CHEWABLE ORAL DAILY PRN
Status: DISCONTINUED | OUTPATIENT
Start: 2022-07-28 | End: 2022-08-01 | Stop reason: HOSPADM

## 2022-07-28 RX ORDER — ACETAMINOPHEN 325 MG/1
650 TABLET ORAL EVERY 6 HOURS
Status: DISCONTINUED | OUTPATIENT
Start: 2022-07-28 | End: 2022-08-01 | Stop reason: HOSPADM

## 2022-07-28 RX ORDER — DIPHENHYDRAMINE HYDROCHLORIDE 50 MG/ML
25 INJECTION INTRAMUSCULAR; INTRAVENOUS EVERY 6 HOURS PRN
Status: ACTIVE | OUTPATIENT
Start: 2022-07-28 | End: 2022-07-29

## 2022-07-28 RX ORDER — SODIUM CHLORIDE, SODIUM LACTATE, POTASSIUM CHLORIDE, CALCIUM CHLORIDE 600; 310; 30; 20 MG/100ML; MG/100ML; MG/100ML; MG/100ML
INJECTION, SOLUTION INTRAVENOUS CONTINUOUS PRN
Status: DISCONTINUED | OUTPATIENT
Start: 2022-07-28 | End: 2022-07-28

## 2022-07-28 RX ORDER — FENTANYL CITRATE/PF 50 MCG/ML
50 SYRINGE (ML) INJECTION
Status: DISCONTINUED | OUTPATIENT
Start: 2022-07-28 | End: 2022-08-01 | Stop reason: HOSPADM

## 2022-07-28 RX ORDER — DEXAMETHASONE SODIUM PHOSPHATE 4 MG/ML
8 INJECTION, SOLUTION INTRA-ARTICULAR; INTRALESIONAL; INTRAMUSCULAR; INTRAVENOUS; SOFT TISSUE ONCE AS NEEDED
Status: ACTIVE | OUTPATIENT
Start: 2022-07-28 | End: 2022-07-29

## 2022-07-28 RX ORDER — HYDROMORPHONE HCL/PF 1 MG/ML
1 SYRINGE (ML) INJECTION EVERY 2 HOUR PRN
Status: DISCONTINUED | OUTPATIENT
Start: 2022-07-29 | End: 2022-08-01 | Stop reason: HOSPADM

## 2022-07-28 RX ORDER — ALBUTEROL SULFATE 2.5 MG/3ML
2.5 SOLUTION RESPIRATORY (INHALATION) ONCE AS NEEDED
Status: DISCONTINUED | OUTPATIENT
Start: 2022-07-28 | End: 2022-08-01 | Stop reason: HOSPADM

## 2022-07-28 RX ORDER — ONDANSETRON 2 MG/ML
INJECTION INTRAMUSCULAR; INTRAVENOUS AS NEEDED
Status: DISCONTINUED | OUTPATIENT
Start: 2022-07-28 | End: 2022-07-28

## 2022-07-28 RX ORDER — OXYTOCIN/RINGER'S LACTATE 30/500 ML
PLASTIC BAG, INJECTION (ML) INTRAVENOUS CONTINUOUS PRN
Status: DISCONTINUED | OUTPATIENT
Start: 2022-07-28 | End: 2022-07-28

## 2022-07-28 RX ORDER — NALOXONE HYDROCHLORIDE 0.4 MG/ML
0.1 INJECTION, SOLUTION INTRAMUSCULAR; INTRAVENOUS; SUBCUTANEOUS
Status: ACTIVE | OUTPATIENT
Start: 2022-07-28 | End: 2022-07-29

## 2022-07-28 RX ORDER — OXYTOCIN/RINGER'S LACTATE 30/500 ML
62.5 PLASTIC BAG, INJECTION (ML) INTRAVENOUS ONCE
Status: COMPLETED | OUTPATIENT
Start: 2022-07-28 | End: 2022-07-28

## 2022-07-28 RX ORDER — BUPIVACAINE HYDROCHLORIDE 7.5 MG/ML
INJECTION, SOLUTION INTRASPINAL AS NEEDED
Status: DISCONTINUED | OUTPATIENT
Start: 2022-07-28 | End: 2022-07-28

## 2022-07-28 RX ADMIN — FAMOTIDINE 20 MG: 20 TABLET ORAL at 17:49

## 2022-07-28 RX ADMIN — DOCUSATE SODIUM 100 MG: 100 CAPSULE, LIQUID FILLED ORAL at 17:49

## 2022-07-28 RX ADMIN — DOCUSATE SODIUM 100 MG: 100 CAPSULE, LIQUID FILLED ORAL at 08:29

## 2022-07-28 RX ADMIN — NALBUPHINE HYDROCHLORIDE 10 MG: 10 INJECTION, SOLUTION INTRAMUSCULAR; INTRAVENOUS; SUBCUTANEOUS at 03:29

## 2022-07-28 RX ADMIN — FENTANYL CITRATE 50 MCG: 50 INJECTION INTRAMUSCULAR; INTRAVENOUS at 03:44

## 2022-07-28 RX ADMIN — IRON SUCROSE 200 MG: 20 INJECTION, SOLUTION INTRAVENOUS at 15:25

## 2022-07-28 RX ADMIN — KETOROLAC TROMETHAMINE 30 MG: 30 INJECTION, SOLUTION INTRAMUSCULAR at 02:05

## 2022-07-28 RX ADMIN — ACETAMINOPHEN 650 MG: 325 TABLET, FILM COATED ORAL at 04:09

## 2022-07-28 RX ADMIN — KETOROLAC TROMETHAMINE 30 MG: 30 INJECTION, SOLUTION INTRAMUSCULAR at 09:07

## 2022-07-28 RX ADMIN — SODIUM CHLORIDE, SODIUM LACTATE, POTASSIUM CHLORIDE, AND CALCIUM CHLORIDE: .6; .31; .03; .02 INJECTION, SOLUTION INTRAVENOUS at 00:58

## 2022-07-28 RX ADMIN — HYDROXYZINE HYDROCHLORIDE 25 MG: 25 TABLET ORAL at 10:54

## 2022-07-28 RX ADMIN — KETOROLAC TROMETHAMINE 30 MG: 30 INJECTION, SOLUTION INTRAMUSCULAR at 15:15

## 2022-07-28 RX ADMIN — PROPOFOL 20 MG: 10 INJECTION, EMULSION INTRAVENOUS at 02:00

## 2022-07-28 RX ADMIN — DEXAMETHASONE SODIUM PHOSPHATE 10 MG: 10 INJECTION, SOLUTION INTRAMUSCULAR; INTRAVENOUS at 01:30

## 2022-07-28 RX ADMIN — FAMOTIDINE 20 MG: 20 TABLET ORAL at 08:29

## 2022-07-28 RX ADMIN — PROPOFOL 40 MG: 10 INJECTION, EMULSION INTRAVENOUS at 01:58

## 2022-07-28 RX ADMIN — PROPOFOL 20 MG: 10 INJECTION, EMULSION INTRAVENOUS at 02:02

## 2022-07-28 RX ADMIN — ACETAMINOPHEN 650 MG: 325 TABLET, FILM COATED ORAL at 22:33

## 2022-07-28 RX ADMIN — PROPOFOL 20 MG: 10 INJECTION, EMULSION INTRAVENOUS at 02:05

## 2022-07-28 RX ADMIN — ACETAMINOPHEN 650 MG: 325 TABLET, FILM COATED ORAL at 16:36

## 2022-07-28 RX ADMIN — Medication 62.5 MILLI-UNITS/MIN: at 04:05

## 2022-07-28 RX ADMIN — MORPHINE SULFATE 0.15 MG: 1 INJECTION, SOLUTION EPIDURAL; INTRATHECAL; INTRAVENOUS at 01:04

## 2022-07-28 RX ADMIN — ONDANSETRON 4 MG: 2 INJECTION INTRAMUSCULAR; INTRAVENOUS at 01:29

## 2022-07-28 RX ADMIN — MIDAZOLAM 1 MG: 1 INJECTION INTRAMUSCULAR; INTRAVENOUS at 01:07

## 2022-07-28 RX ADMIN — PROPOFOL 20 MG: 10 INJECTION, EMULSION INTRAVENOUS at 02:15

## 2022-07-28 RX ADMIN — PROPOFOL 20 MG: 10 INJECTION, EMULSION INTRAVENOUS at 02:08

## 2022-07-28 RX ADMIN — BUPIVACAINE HYDROCHLORIDE IN DEXTROSE 1.7 ML: 7.5 INJECTION, SOLUTION SUBARACHNOID at 01:04

## 2022-07-28 RX ADMIN — SODIUM CHLORIDE, SODIUM LACTATE, POTASSIUM CHLORIDE, AND CALCIUM CHLORIDE 125 ML/HR: .6; .31; .03; .02 INJECTION, SOLUTION INTRAVENOUS at 01:25

## 2022-07-28 RX ADMIN — PROPOFOL 20 MG: 10 INJECTION, EMULSION INTRAVENOUS at 02:12

## 2022-07-28 RX ADMIN — FERROUS SULFATE TAB 325 MG (65 MG ELEMENTAL FE) 325 MG: 325 (65 FE) TAB at 08:29

## 2022-07-28 RX ADMIN — SODIUM CHLORIDE, SODIUM LACTATE, POTASSIUM CHLORIDE, AND CALCIUM CHLORIDE 125 ML/HR: .6; .31; .03; .02 INJECTION, SOLUTION INTRAVENOUS at 11:55

## 2022-07-28 RX ADMIN — ACETAMINOPHEN 650 MG: 325 TABLET, FILM COATED ORAL at 10:04

## 2022-07-28 RX ADMIN — FENTANYL CITRATE 50 MCG: 50 INJECTION INTRAMUSCULAR; INTRAVENOUS at 03:16

## 2022-07-28 RX ADMIN — CEFAZOLIN SODIUM 2000 MG: 2 SOLUTION INTRAVENOUS at 01:05

## 2022-07-28 RX ADMIN — Medication 500 MILLI-UNITS/MIN: at 01:24

## 2022-07-28 RX ADMIN — AZITHROMYCIN MONOHYDRATE 500 MG: 500 INJECTION, POWDER, LYOPHILIZED, FOR SOLUTION INTRAVENOUS at 01:08

## 2022-07-28 RX ADMIN — FENTANYL CITRATE 15 MCG: 50 INJECTION, SOLUTION INTRAMUSCULAR; INTRAVENOUS at 01:04

## 2022-07-28 RX ADMIN — SODIUM CHLORIDE, SODIUM LACTATE, POTASSIUM CHLORIDE, AND CALCIUM CHLORIDE 125 ML/HR: .6; .31; .03; .02 INJECTION, SOLUTION INTRAVENOUS at 04:02

## 2022-07-28 RX ADMIN — HYDROXYZINE HYDROCHLORIDE 25 MG: 25 TABLET ORAL at 17:49

## 2022-07-28 RX ADMIN — PHENYLEPHRINE HYDROCHLORIDE 40 MCG/MIN: 10 INJECTION INTRAVENOUS at 01:06

## 2022-07-28 RX ADMIN — SODIUM CHLORIDE, SODIUM LACTATE, POTASSIUM CHLORIDE, AND CALCIUM CHLORIDE: .6; .31; .03; .02 INJECTION, SOLUTION INTRAVENOUS at 01:40

## 2022-07-28 RX ADMIN — HYDROMORPHONE HYDROCHLORIDE 0.5 MG: 1 INJECTION, SOLUTION INTRAMUSCULAR; INTRAVENOUS; SUBCUTANEOUS at 10:52

## 2022-07-28 RX ADMIN — KETOROLAC TROMETHAMINE 30 MG: 30 INJECTION, SOLUTION INTRAMUSCULAR at 20:13

## 2022-07-28 RX ADMIN — HYDROMORPHONE HYDROCHLORIDE 0.5 MG: 1 INJECTION, SOLUTION INTRAMUSCULAR; INTRAVENOUS; SUBCUTANEOUS at 05:01

## 2022-07-28 NOTE — QUICK NOTE
Post Op Check - OB/GYN  Lovely Goins 23 y o  female MRN: [id number]  Unit/Bed#: -01 Encounter: [id number]      Subjective:  Lovely Goins is doing well post-operatively from a classical  delivery  Baby is in the NICU, doing well   Baby was seen on NICU rounds w/ Dr Yael Farr this AM     Vitals:   /72 (BP Location: Left arm)   Pulse 99   Temp 98 3 °F (36 8 °C) (Oral)   Resp 18   Ht 6' (1 829 m)   Wt 81 6 kg (180 lb)   LMP 2021   SpO2 97%   Breastfeeding Yes   BMI 24 41 kg/m²     Physical Exam:   GEN: Lovely Goins appears well, alert and oriented x 3, pleasant and cooperative   ABDOMEN: soft, no tenderness, no distention, fundus @ U-3, Incision C/D/I  EXTREMITIES: SCDs on    Plan:  - Case out today once pain is better controlled  - Regular diet  - Simethicone for gas pain  - Breast pump  - Visit Baby in NICU  - Pain management per anesthesia for first 24 hrs        Lyly Aguirre DO  2022  8:20 AM

## 2022-07-28 NOTE — DISCHARGE SUMMARY
Discharge Summary - Lance Downing 23 y o  female MRN: [id number]    Unit/Bed#: -01 Encounter: [id number]    Admission Date: 2022     Discharge Date: 2022    Admitting Attending: Dr Iram García  Delivering Attending: Dr Rubin Romero  Discharge Attending: Dr Zev George    Diagnosis:  1  Premature prelabor rupture of membranes  2  Possible protein S deficiency  3  Anxiety    Procedures: primary classical  section    Complications: none apparent     Janet Jacome is a 25yo  who was admitted for PPROM at 28w2d  She received latency antibiotics, magnesium for fetal neuroprotection, and betamethasone for fetal lung maturity  On hospital day #10, she began to feel back pain and pressure, and was found to be 3 cm dilated, with the fetus in transverse presentation  The decision was made to proceed with a primary  delivery  She underwent an uncomplicated classical  delivery  She delivered a viable male  at 1 on 22  Weight was 4lbs 1oz (1830g) with APGARs of 7 and 9 at 1 and 5 minutes  Her preoperative Hb was 10 7  Her postoperative Hb was 9 1  Her postoperative course was uncomplicated  She does have a family history of unprovoked clots and will need postpartum blood work done at 16 weeks       Condition at discharge: stable     On day of discharge pain was well controlled, patient was tolerating PO, passing flatus, had a bowel movement  She was discharged with standard post partum/ post operative instructions to follow up with her physician in 1 week for an incision check and in 3-6 weeks for a postpartum appointment  Discharge instructions/Information to patient and family:   -Do not place anything (no partner, tampons or douche) in your vagina for 6 weeks    -You may walk for exercise for the first 6 weeks then gradually return to your usual activities    -Please do not drive for 1 week if you have no stitches and for 2 weeks if you have stitches or underwent a  delivery     -You may take baths or shower per your preference    -Please look at your bust (breasts) in the mirror daily and call for redness or tenderness or increased warmth    -Please call us for temperature > 100 4*F or 38* C, worsening pain or a foul discharge       Discharge Medications:   Prenatal vitamin daily for 6 months or the duration of nursing whichever is longer  Motrin 600 mg orally every 6 hours as needed for pain  Tylenol (over the counter) per bottle directions as needed for pain: do NOT use with percocet  Hydrocortisone cream 1% (over the counter) applied 1-2x daily to hemorrhoids as needed  Percocet as needed    Provisions for Follow-Up Care: Follow up with your doctor in 1 week for incision site check       Planned Readmission: none    Mike Hussein MD  OBGYN PGY-1  2022 3:26 PM

## 2022-07-28 NOTE — LACTATION NOTE
Mom provided with and discussed RBS, Hand expression/2nd night handout and increase supply for NICU baby  Reviewed pumping log and expectations for pumping output in the first week  Reviewed cycle pumping and appropriate pump settings, as well as pumping for 10-15 min 8-12 times per day  Initiated pumping at this time per Mom's request        Enc Mom to discussed putting baby to the breast with the NICU team when baby is medically stable to do so  Enc her to call for lactation support as needed throughout her stay  Mom has spectra pump at home  Advised on flange fit

## 2022-07-28 NOTE — PROGRESS NOTES
Patient seen and assessed at 22 40  Late entry due to acute patient care  Introduced myself to Allen as the attending on the labor floor tonight in light of the change in her presentation  She began having back cramping occur about 7:30pm tonight  She has been inpatient for approximately 9 days for PPROM diagnosis  She is currently 29 weeks and on ultrasound evaluation was noted to have fetus in transverse presentation  Cervical examination was performed tonight due to back cramping occurring and persistent and she was found to be 3/80/-2  On presentation her cervix was visually closed  Discussed with patient that with her earlier gestation and concern for labor by the dilation and change in her cervix would recommend proceeding with delivery at this time  Repeat ultrasound was performed at bedside and confirmed continued transverse presentation with head to maternal L  Explained to patient that with fetus in transverse presentation would not recommend laboring and vaginal delivery and with her increased dilation to 3cm she is at increased risk for cord prolapse during a labor course  Recommend proceeding with  delivery tonight  Discussed with patient risks regarding  section including injury to surrounding organs including bowel and bladder  Discussed possible need for classical  section due to her gestational age in order to reduce risk of blood loss but also due to fetal position as well  Explained that if a classical  section was performed, repeat  sections would be needed in future pregnancies as laboring is not recommended due to much higher risk of uterine rupture  Reviewed presence of NICU at  delivery and anticipation that  will need extended NICU stay due to prematurity  All questions were answered to Ailyn's satisfaction and delivery consent was signed  NICU was made aware of plan for delivery

## 2022-07-28 NOTE — DISCHARGE INSTRUCTIONS
WHAT YOU NEED TO KNOW:   A , or  section, is abdominal surgery to deliver your baby  DISCHARGE INSTRUCTIONS:   Call 911 for any of the following: You feel lightheaded, short of breath, and have chest pain  You cough up blood  Seek care immediately if:   Blood soaks through your bandage  Your stitches come apart  Your arm or leg feels warm, tender, and painful  It may look swollen and red  Contact your OB if:   You have heavy vaginal bleeding that fills 1 or more sanitary pads in 1 hour  You have a fever  Your incision is swollen, red, or draining pus  You have questions or concerns about yourself or your baby  Medicines: You may  need any of the following:  Prescription pain medicine  may be given  Ask how to take this medicine safely  Acetaminophen  decreases pain and fever  It is available without a doctor's order  Ask how much to take and how often to take it  Follow directions  Acetaminophen can cause liver damage if not taken correctly  NSAIDs , such as ibuprofen, help decrease swelling, pain, and fever  NSAIDs can cause stomach bleeding or kidney problems in certain people  If you take blood thinner medicine, always ask your healthcare provider if NSAIDs are safe for you  Always read the medicine label and follow directions  Take your medicine as directed  Contact your healthcare provider if you think your medicine is not helping or if you have side effects  Tell him or her if you are allergic to any medicine  Keep a list of the medicines, vitamins, and herbs you take  Include the amounts, and when and why you take them  Bring the list or the pill bottles to follow-up visits  Carry your medicine list with you in case of an emergency  Wound care:  Carefully wash your wound with soap and water every day  Keep your wound clean and dry  Wear loose, comfortable clothes that do not rub against your wound   Ask your OB about bathing and showering  Limit activity as directed: Ask when it is safe for you to drive, walk up stairs, lift heavy objects, and have sex  Ask when it is okay to exercise, and what types of exercise to do  Start slowly and do more as you get stronger  Drink liquids as directed:  Liquids help keep you hydrated after your procedure and decrease your risk for a blood clot  Ask how much liquid to drink each day and which liquids are best for you  Follow up with your OB as directed: You may need to return to have your stitches or staples removed  Write down your questions so you remember to ask them during your visits  © 2017 [employer]  Information is for End User's use only and may not be sold, redistributed or otherwise used for commercial purposes  All illustrations and images included in CareNotes® are the copyrighted property of A [employer]  or [employer]  The above information is an  only  It is not intended as medical advice for individual conditions or treatments  Talk to your doctor, nurse or pharmacist before following any medical regimen to see if it is safe and effective for you  Postpartum Perineal Care   WHAT YOU NEED TO KNOW:   Postpartum perineal care is care for your perineum after you have a baby  The perineum is your vagina and anus  DISCHARGE INSTRUCTIONS:   Care for your perineum:  Healthcare providers will give you a small squirt bottle and show you how to use it  Do the following after you use the toilet and before you put on a new pad:  Remove the soiled pad    Use the squirt bottle to rinse your perineum from front to back while you sit on the toilet     Pat the area dry from front to back with toilet paper or a cotton cloth     Put on a fresh pad    Wash your hands  Decrease pain:  Ask your healthcare provider about these and other ways to decrease perineal pain:  Sitz baths:  Healthcare providers may give you a portable sitz bath   This is a small tub that fits in the toilet  Fill the sitz bath or bathtub with 4 to 6 inches of warm water  Sit in the warm water for 20 minutes 2 to 3 times a day  Ice:  Ice helps decrease swelling and pain  Ice may also help prevent tissue damage  Use an ice pack, or put crushed ice in a plastic bag  Cover it with a towel and place it on your perineum for 15 to 20 minutes every hour, or as directed  Medicine spray, wipes, or pads:  Healthcare providers may give you a medicine spray or wipes soaked with numbing medicine to decrease the pain  Pads that contain an herb called witch hazel may also help reduce pain  Use these after perineal care or a sitz bath  Follow up with your healthcare provider as directed:  Write down your questions so you remember to ask them during your visits  Contact your healthcare provider if:   You have heavy vaginal bleeding that fills 1 or more sanitary pads in 1 hour  You have foul-smelling vaginal discharge  You feel weak or lightheaded  You have questions or concerns about your condition or care  Seek care immediately or call 911 if:   You have large blood clots or bright red blood coming from your vagina  You have abdominal pain, vomiting, and a fever  © 2017 [employer]  Information is for End User's use only and may not be sold, redistributed or otherwise used for commercial purposes  All illustrations and images included in CareNotes® are the copyrighted property of [employer]  or [employer]  The above information is an  only  It is not intended as medical advice for individual conditions or treatments  Talk to your doctor, nurse or pharmacist before following any medical regimen to see if it is safe and effective for you  Postpartum Depression   WHAT YOU NEED TO KNOW:   What is postpartum depression? Postpartum depression is a mood disorder that occurs after giving birth  A mood is an emotion or a feeling   Moods affect your behavior and how you feel about yourself and life in general  Depression is a sad mood that you cannot control  Women often feel sad, afraid, or nervous after their baby is born  These feelings are called postpartum blues or baby blues, and they usually go away in 1 to 2 weeks  With postpartum depression, these symptoms get worse and continue for more than 2 weeks  Postpartum depression is a serious condition that affects your daily activities and relationships  What causes postpartum depression? Healthcare providers do not know exactly what causes postpartum depression  It may be caused by a sudden drop in hormone levels after childbirth  A previous episode of postpartum depression or a family history of depression may increase your risk  Several things may trigger postpartum depression:  Lack of support from the baby's father or other family members    Feeling more tired than usual    Stress, a poor diet, or lack of sleep    Pain after childbirth or pain during breastfeeding    Sudden change in lifestyle  How is postpartum depression diagnosed? Postpartum depression affects your daily activities and your relationships with other people  Healthcare providers will ask you questions about your signs and symptoms and how they are affecting your life  The symptoms of postpartum depression usually begin within 1 month after childbirth  You feel depressed or lose interest in activities you enjoy nearly every day for at least 2 weeks  You also have 4 or more of the following symptoms: You feel tired or have less energy than usual      You feel unimportant or guilty most of the time  You think about hurting or killing yourself  Your appetite changes  You may lose your appetite and lose weight without trying  Your appetite may also increase and you may gain weight  You are restless, irritable, or withdrawn  You have trouble concentrating and remembering things   You have trouble doing daily tasks or making decisions  You have trouble sleeping, even after the baby is asleep  How is postpartum depression treated? Psychotherapy:  During therapy, you will talk with healthcare providers about how to cope with your feelings and moods  This can be done alone or in a group  It may also be done with family members or your partner  Antidepressants: This medicine is given to decrease or stop the symptoms of depression  You usually need to take antidepressants for several weeks before you begin to feel better  Do not stop taking antidepressants unless your healthcare provider tells you to  Healthcare providers may try a different antidepressant if one type does not work  What can I do to feel better? Rest:  Do not try to do everything all at the same time  Do only what is needed and let other things wait until later  Ask your family or friends for help, especially if you have other children  Ask your partner to help with night feedings or other baby care  Try to sleep when the baby naps  Get emotional support:  Share your feelings with your partner, a friend, or another mother  Take care of yourself:  Shower and dress each day  Do not skip meals  Try to get out of the house a little each day  Get regular exercise  Eat a healthy diet  Avoid alcohol because it can make your depression worse  Do not isolate yourself  Go for a walk or meet with a friend  It is also important that you have some time by yourself each day  How do I find support and more information? [address] [place] [address], Public Information & Communication Branch  [address], [address], [address]  [place] [address]   Phone: [phone]  Phone: [phone]  Web Address: [facility]  When should I contact my healthcare provider? You cannot make it to your next visit  Your depression does not get better with treatment or it gets worse       You have questions or concerns about your condition or care  When should I seek immediate care or call 911? You think about hurting or killing yourself, your baby, or someone else  You feel like other people want to hurt you  You hear voices telling you to hurt yourself or your baby  CARE AGREEMENT:   You have the right to help plan your care  Learn about your health condition and how it may be treated  Discuss treatment options with your caregivers to decide what care you want to receive  You always have the right to refuse treatment  The above information is an  only  It is not intended as medical advice for individual conditions or treatments  Talk to your doctor, nurse or pharmacist before following any medical regimen to see if it is safe and effective for you  ©  2600 [facility] Information is for End User's use only and may not be sold, redistributed or otherwise used for commercial purposes  All illustrations and images included in CareNotes® are the copyrighted property of A [employer] M [employer] Inc  or [employer]  Postpartum Bleeding   WHAT YOU NEED TO KNOW:   Postpartum bleeding is vaginal bleeding after childbirth  This bleeding is normal, whether your baby was born vaginally or by   It contains blood and the tissue that lined the inside of your uterus when you were pregnant  DISCHARGE INSTRUCTIONS:   What to expect with postpartum bleeding:  Postpartum bleeding usually lasts at least 10 days, and may last longer than 6 weeks  Your bleeding may range from light (barely staining a pad) to heavy (soaking a pad in 1 hour)  Usually, you have heavier bleeding right after childbirth, which slows over the next few weeks until it stops  The bleeding is red or dark brown with clots for the first 1 to 3 days  It then turns pink for several days, and then becomes a white or yellow discharge until it ends    Follow up with your obstetrician as directed:  Do not have sex until your obstetrician says it is okay  Write down your questions so you remember to ask them during your visits  Contact your healthcare provider or obstetrician if:   Your bleeding increases, or you have heavy bleeding that soaks a pad in 1 hour for 2 hours in a row  You pass large blood clots  You are breathing faster than normal, or your heart is beating faster than normal     You are urinating less than usual, or not at all  You feel dizzy  You have questions or concerns about your condition or care  Seek immediate care or call 911 if:   You are suddenly short of breath and feel lightheaded  You have sudden chest pain  © 2017 [employer]  Information is for End User's use only and may not be sold, redistributed or otherwise used for commercial purposes  All illustrations and images included in CareNotes® are the copyrighted property of [employer]  or [employer]  The above information is an  only  It is not intended as medical advice for individual conditions or treatments  Talk to your doctor, nurse or pharmacist before following any medical regimen to see if it is safe and effective for you  Breast Care for the Breast Feeding Mother   WHAT YOU SHOULD KNOW:   Your breasts will go through normal changes while you are breastfeeding  Sometimes breast and nipple problems can develop while you are breastfeeding  Learn about changes that are normal and those that may be a problem  Breast care can help you prevent and manage problems so you and your baby can enjoy the benefits of breastfeeding  AFTER YOU LEAVE:   Breast changes while you are breastfeeding: For the first few days after your baby is born, your body makes a small amount of breast milk (colostrum)  Within about 2 to 5 days, your body will begin making mature milk  It may take up to 10 days or longer for mature milk to come in   When your mature milk comes in, your breasts will become full and firm  They may feel tender  Breastfeeding your baby will decrease the full feeling in your breasts  You may feel a tingly sensation during feedings as milk is released from your breasts  This is called the milk let-down reflex  After 7 or more days, the fullness may feel like it has decreased  Your nipples should look the same as they did before you started breastfeeding  Breasts that feel full before and empty after breastfeeding are signs that breastfeeding is going well  Breast problems that can occur while you are breastfeeding:   Nipple soreness  may occur when you begin to breastfeed your baby  You may also have nipple soreness if your baby is not latched on to your breast correctly  Correct positioning and latch-on may decrease or stop the pain in your nipples  Work with your caregivers to help your baby latch on correctly  It may also be helpful to place warm, wet compresses on your nipples to help decrease pain  Plugged milk ducts  may cause painful breast lumps  Plugged ducts may be caused by not emptying your breasts completely during feedings  When your baby pauses during breastfeeding, massage and gently squeeze your breast  Gentle massage may unplug a blocked milk duct  Pump out any milk left in your breasts after your baby is done breastfeeding  Avoid wearing tight tops, tight bras, or under-wire bras, because they may put pressure on your breasts  Engorgement  may occur as your milk comes in soon after you begin breastfeeding  Engorgement may cause your breasts to become swollen and painful  Your breasts may also become engorged if you miss a feeding or you do not breastfeed on demand  The best way to decrease engorgement symptoms is to empty your breasts by feeding your baby often  Engorgement can make it hard for your baby to latch on to your breast  If this happens, express a small amount of milk and then have your baby latch on   Cold compresses, gel packs, or ice packs on your breasts can help decrease pain and swelling  Ask your caregiver how often and how long you should use cold, or ice packs  A breast infection called mastitis  can develop if you have plugged milk ducts or engorgement  Mastitis causes your breasts to become red, swollen, and painful  You may also have flu-like symptoms, such as chills and a fever  Place heat on your breasts to help decrease the pain  You may want to place a moist, warm cloth on the painful breast or both of your breasts  Ask how often to do this  Your primary healthcare provider [facility]) may suggest that you take an NSAID, such as ibuprofen, to decrease pain and swelling  He may also order antibiotics to treat mastitis  Ask about feeding your baby when you have a breast infection  How to help prevent or manage breast problems while you are breastfeeding:   Learn how to position your baby and latch him on correctly  To latch your baby correctly to your breast, make sure that his mouth covers most of your areola (dark area around your nipple)  He should not be attached only to the nipple  Your baby is latched on well if you feel comfortable and do not feel pain  A correct latch helps him get enough milk and can help to prevent sore nipples and other breast problems  There are several breastfeeding positions that you can try  Find the position that works best for you and your baby  Ask your caregiver for more information about how to hold and breastfeed your baby  Prevent biting  Your baby may get teeth at about 1to 3months of age  To help prevent biting, break his suction once he is finished or if he has fallen asleep  To break his suction, slip a finger into the side of his mouth  If your baby bites you, respond with surprise or unhappiness  Offer praise when he does not bite you  Breastfeed your baby regularly  Feed your baby 8 to 12 times a day  You may need to wake up your baby at night to feed him   It is okay to feed from 1 or both breasts at each feeding  Your baby should breastfeed from both breasts equally over the course of a day  If your baby only feeds from 1 side during a feeding, offer your other breast to him first for the next feeding  Schedule and keep follow-up visits  Talk to your baby's pediatrician or your PHP during follow-up visits if you have breast problems  Caregivers may suggest that you, or you and your partner, attend classes on breastfeeding  You also may want to join a breastfeeding support group  Caregivers may suggest that you see a lactation consultant  This is a caregiver who can help you with breastfeeding  Contact your PHP if:   You have a fever and chills  You have body aches and you feel like you do not have any energy  One or both of your breasts is red, swollen or hard, painful, and feels warm or hot  You have breast engorgement that does not get better within 24 hours  You see or feel a lump in your breast that hurts when you touch it  You have nipple pain during breastfeeding or between feedings  Your nipples are red, dry, cracked, or bleeding, or they have scabs on them  You have questions or concerns about your condition or care  © 2014 [address] is for End User's use only and may not be sold, redistributed or otherwise used for commercial purposes  All illustrations and images included in CareNotes® are the copyrighted property of A [employer]  or [employer]  The above information is an  only  It is not intended as medical advice for individual conditions or treatments  Talk to your doctor, nurse or pharmacist before following any medical regimen to see if it is safe and effective for you

## 2022-07-28 NOTE — PLAN OF CARE
Problem: ANTEPARTUM  Goal: Maintain pregnancy as long as maternal and/or fetal condition is stable  Description: INTERVENTIONS:  - Maternal surveillance  - Fetal surveillance  - Monitor uterine activity  - Medications as ordered  - Bedrest  Outcome: Completed     Problem: POSTPARTUM  Goal: Experiences normal postpartum course  Description: INTERVENTIONS:  - Monitor maternal vital signs  - Assess uterine involution and lochia  Outcome: Progressing  Goal: Appropriate maternal -  bonding  Description: INTERVENTIONS:  - Identify family support  - Assess for appropriate maternal/infant bonding   -Encourage maternal/infant bonding opportunities  - Referral to  or  as needed  Outcome: Progressing  Goal: Establishment of infant feeding pattern  Description: INTERVENTIONS:  - Assess breast/bottle feeding  - Refer to lactation as needed  Outcome: Progressing  Goal: Incision(s), wounds(s) or drain site(s) healing without S/S of infection  Description: INTERVENTIONS  - Assess and document dressing, incision, wound bed, drain sites and surrounding tissue  - Provide patient and family education  - Perform skin care/dressing changes every   Outcome: Progressing     Problem: PAIN - ADULT  Goal: Verbalizes/displays adequate comfort level or baseline comfort level  Description: Interventions:  - Encourage patient to monitor pain and request assistance  - Assess pain using appropriate pain scale  - Administer analgesics based on type and severity of pain and evaluate response  - Implement non-pharmacological measures as appropriate and evaluate response  - Consider cultural and social influences on pain and pain management  - Notify physician/advanced practitioner if interventions unsuccessful or patient reports new pain  Outcome: Progressing     Problem: INFECTION - ADULT  Goal: Absence or prevention of progression during hospitalization  Description: INTERVENTIONS:  - Assess and monitor for signs and symptoms of infection  - Monitor lab/diagnostic results  - Monitor all insertion sites, i e  indwelling lines, tubes, and drains  - Monitor endotracheal if appropriate and nasal secretions for changes in amount and color  - [place] appropriate cooling/warming therapies per order  - Administer medications as ordered  - Instruct and encourage patient and family to use good hand hygiene technique  - Identify and instruct in appropriate isolation precautions for identified infection/condition  Outcome: Progressing     Problem: SAFETY ADULT  Goal: Patient will remain free of falls  Description: INTERVENTIONS:  - Educate patient/family on patient safety including physical limitations  - Instruct patient to call for assistance with activity   - Consult OT/PT to assist with strengthening/mobility   - Keep Call bell within reach  - Keep bed low and locked with side rails adjusted as appropriate  - Keep care items and personal belongings within reach  - Initiate and maintain comfort rounds  - Make Fall Risk Sign visible to staff  - Apply yellow socks and bracelet for high fall risk patients  - Consider moving patient to room near nurses station  Outcome: Progressing  Goal: Maintain or return to baseline ADL function  Description: INTERVENTIONS:  -  Assess patient's ability to carry out ADLs; assess patient's baseline for ADL function and identify physical deficits which impact ability to perform ADLs (bathing, care of mouth/teeth, toileting, grooming, dressing, etc )  - Assess/evaluate cause of self-care deficits   - Assess range of motion  - Assess patient's mobility; develop plan if impaired  - Assess patient's need for assistive devices and provide as appropriate  - Encourage maximum independence but intervene and supervise when necessary  - Involve family in performance of ADLs  - Assess for home care needs following discharge   - Consider OT consult to assist with ADL evaluation and planning for discharge  - Provide patient education as appropriate  Outcome: Progressing  Goal: Maintains/Returns to pre admission functional level  Description: INTERVENTIONS:  - Perform BMAT or MOVE assessment daily    - Set and communicate daily mobility goal to care team and patient/family/caregiver  - Collaborate with rehabilitation services on mobility goals if consulted  - Out of bed for toileting  - Record patient progress and toleration of activity level   Outcome: Progressing     Problem: Knowledge Deficit  Goal: Patient/family/caregiver demonstrates understanding of disease process, treatment plan, medications, and discharge instructions  Description: Complete learning assessment and assess knowledge base    Interventions:  - Provide teaching at level of understanding  - Provide teaching via preferred learning methods  Outcome: Progressing     Problem: DISCHARGE PLANNING  Goal: Discharge to home or other facility with appropriate resources  Description: INTERVENTIONS:  - Identify barriers to discharge w/patient and caregiver  - Arrange for needed discharge resources and transportation as appropriate  - Identify discharge learning needs (meds, wound care, etc )  - Arrange for interpretive services to assist at discharge as needed  - Refer to Case Management Department for coordinating discharge planning if the patient needs post-hospital services based on physician/advanced practitioner order or complex needs related to functional status, cognitive ability, or social support system  Outcome: Progressing

## 2022-07-28 NOTE — QUICK NOTE
Late entry due to direct patient care    Notified by patient's RN that patient was feeling worsening back pain and pelvic pressure  SVE 3/80/-3, fetus in transverse presentation, confirmed by transabdominal ultrasound  At this point, will proceed with primary  delivery  NPO, will re-start magnesium, will bring downstairs to L and D       Florence Sears MD  OB/GYN PGY-4  2022  11:34 PM

## 2022-07-28 NOTE — ANESTHESIA PROCEDURE NOTES
Spinal Block    Patient location during procedure: OB  Start time: 7/28/2022 1:04 AM  Reason for block: primary anesthetic  Staffing  Performed: CRNA   Resident/CRNA: Gilbert Hardy CRNA  Preanesthetic Checklist  Completed: patient identified, IV checked, site marked, risks and benefits discussed, surgical consent, monitors and equipment checked, pre-op evaluation and timeout performed  Spinal Block  Patient position: sitting  Prep: ChloraPrep  Patient monitoring: heart rate, continuous pulse ox and frequent blood pressure checks  Approach: midline  Location: L3-4  Injection technique: single-shot  Needle  Needle type: pencil-tip   Needle gauge: 25 G  Needle length: 10 cm  Assessment  Sensory level: T10  Events: cerebrospinal fluid  Injection Assessment:  negative aspiration for heme, no paresthesia on injection and positive aspiration for clear CSF    Post-procedure:  site cleaned

## 2022-07-28 NOTE — ANESTHESIA POSTPROCEDURE EVALUATION
Post-Op Assessment Note    CV Status:  Stable  Pain Score: 0    Pain management: adequate     Mental Status:  Arousable, awake and alert   Hydration Status:  Stable   PONV Controlled:  None   Airway Patency:  Patent      Post Op Vitals Reviewed: Yes      Staff: CRNA         No complications documented      BP  93/53    Temp 97 7   Pulse 83   Resp 17   SpO2 98

## 2022-07-28 NOTE — PLAN OF CARE
Problem: POSTPARTUM  Goal: Experiences normal postpartum course  Description: INTERVENTIONS:  - Monitor maternal vital signs  - Assess uterine involution and lochia  Outcome: Progressing  Goal: Appropriate maternal -  bonding  Description: INTERVENTIONS:  - Identify family support  - Assess for appropriate maternal/infant bonding   -Encourage maternal/infant bonding opportunities  - Referral to  or  as needed  Outcome: Progressing  Goal: Establishment of infant feeding pattern  Description: INTERVENTIONS:  - Assess breast/bottle feeding  - Refer to lactation as needed  Outcome: Progressing  Goal: Incision(s), wounds(s) or drain site(s) healing without S/S of infection  Description: INTERVENTIONS  - Assess and document dressing, incision, wound bed, drain sites and surrounding tissue  - Provide patient and family education  - Perform skin care/dressing changes every   Outcome: Progressing     Problem: PAIN - ADULT  Goal: Verbalizes/displays adequate comfort level or baseline comfort level  Description: Interventions:  - Encourage patient to monitor pain and request assistance  - Assess pain using appropriate pain scale  - Administer analgesics based on type and severity of pain and evaluate response  - Implement non-pharmacological measures as appropriate and evaluate response  - Consider cultural and social influences on pain and pain management  - Notify physician/advanced practitioner if interventions unsuccessful or patient reports new pain  Outcome: Progressing     Problem: INFECTION - ADULT  Goal: Absence or prevention of progression during hospitalization  Description: INTERVENTIONS:  - Assess and monitor for signs and symptoms of infection  - Monitor lab/diagnostic results  - Monitor all insertion sites, i e  indwelling lines, tubes, and drains  - Monitor endotracheal if appropriate and nasal secretions for changes in amount and color  - [place] appropriate cooling/warming therapies per order  - Administer medications as ordered  - Instruct and encourage patient and family to use good hand hygiene technique  - Identify and instruct in appropriate isolation precautions for identified infection/condition  Outcome: Progressing     Problem: SAFETY ADULT  Goal: Patient will remain free of falls  Description: INTERVENTIONS:  - Educate patient/family on patient safety including physical limitations  - Instruct patient to call for assistance with activity   - Consult OT/PT to assist with strengthening/mobility   - Keep Call bell within reach  - Keep bed low and locked with side rails adjusted as appropriate  - Keep care items and personal belongings within reach  - Initiate and maintain comfort rounds  - Make Fall Risk Sign visible to staff  - Apply yellow socks and bracelet for high fall risk patients  - Consider moving patient to room near nurses station  Outcome: Progressing  Goal: Maintain or return to baseline ADL function  Description: INTERVENTIONS:  -  Assess patient's ability to carry out ADLs; assess patient's baseline for ADL function and identify physical deficits which impact ability to perform ADLs (bathing, care of mouth/teeth, toileting, grooming, dressing, etc )  - Assess/evaluate cause of self-care deficits   - Assess range of motion  - Assess patient's mobility; develop plan if impaired  - Assess patient's need for assistive devices and provide as appropriate  - Encourage maximum independence but intervene and supervise when necessary  - Involve family in performance of ADLs  - Assess for home care needs following discharge   - Consider OT consult to assist with ADL evaluation and planning for discharge  - Provide patient education as appropriate  Outcome: Progressing  Goal: Maintains/Returns to pre admission functional level  Description: INTERVENTIONS:  - Perform BMAT or MOVE assessment daily    - Set and communicate daily mobility goal to care team and patient/family/caregiver  - Collaborate with rehabilitation services on mobility goals if consulted  - Out of bed for toileting  - Record patient progress and toleration of activity level   Outcome: Progressing     Problem: Knowledge Deficit  Goal: Patient/family/caregiver demonstrates understanding of disease process, treatment plan, medications, and discharge instructions  Description: Complete learning assessment and assess knowledge base    Interventions:  - Provide teaching at level of understanding  - Provide teaching via preferred learning methods  Outcome: Progressing     Problem: DISCHARGE PLANNING  Goal: Discharge to home or other facility with appropriate resources  Description: INTERVENTIONS:  - Identify barriers to discharge w/patient and caregiver  - Arrange for needed discharge resources and transportation as appropriate  - Identify discharge learning needs (meds, wound care, etc )  - Arrange for interpretive services to assist at discharge as needed  - Refer to Case Management Department for coordinating discharge planning if the patient needs post-hospital services based on physician/advanced practitioner order or complex needs related to functional status, cognitive ability, or social support system  Outcome: Progressing [place] Counseling: I discussed with the patient the risks of ixekizumab including but not limited to immunosuppression, serious infections, worsening of inflammatory bowel disease and drug reactions.  The patient understands that monitoring is required including a PPD at baseline and must alert us or the primary physician if symptoms of infection or other concerning signs are noted.

## 2022-07-28 NOTE — ANESTHESIA PREPROCEDURE EVALUATION
Procedure:   SECTION () (N/A Uterus)    23year old  who presents with PPROM @29 weeks, now going for CS above     Not NPO appropriate  Last solids @630PM    Lab Results   Component Value Date    WBC 17 72 (H) 2022    HGB 10 7 (L) 2022    HCT 31 6 (L) 2022    MCV 94 2022     2022       Chemistry        Component Value Date/Time    K 3 9 2022 2134    K 4 5 2021 0818     2022 2134     2021 0818    CO2 20 (L) 2022 2134    CO2 27 2021 0818    BUN 5 2022 2134    BUN 8 2021 0818    CREATININE 0 51 (L) 2022 2134        Component Value Date/Time    CALCIUM 7 2 (L) 2022 2134    CALCIUM 9 3 2021 0818    ALKPHOS 96 2022 2134    ALKPHOS 45 2021 0818    AST 14 2022 2134    AST 30 2021 0818    ALT 11 2022 2134    ALT 36 (H) 2021 0818        No results found for: INR, PROTIME        Relevant Problems   GYN   (+) 29 weeks gestation of pregnancy      NEURO/PSYCH   (+) Anxiety        Physical Exam    Airway    Mallampati score: I  TM Distance: >3 FB  Neck ROM: full     Dental   No notable dental hx     Cardiovascular  Rhythm: regular, Rate: normal,     Pulmonary  Breath sounds clear to auscultation,     Other Findings  Intercisor Distance > 3cm          Anesthesia Plan  ASA Score- 2 Emergent    Anesthesia Type- spinal with ASA Monitors  Additional Monitors:   Airway Plan:     Comment: Discussed benefits/risks of neuraxial anesthesia including possibility of discomfort at site of injection, post-dural puncture headache, block failure, and more rare complications such as bleeding, infection, and permanent nerve damage  If block fails or there is difficulty placing neuraxial block, general anesthesia was discussed as back-up plan  Patient understands and wishes to proceed  All questions answered  Not NPO appropriate   Solids @630PM  Will provide aspiration ppx with reglan, famotidine, and bicitra          Plan Factors-Exercise tolerance (METS): >4 METS  Chart reviewed  EKG reviewed  Existing labs reviewed  Induction-     Postoperative Plan- Plan for postoperative opioid use  Informed Consent- Anesthetic plan and risks discussed with patient  I personally reviewed this patient with the CRNA  Discussed and agreed on the Anesthesia Plan with the CRNA  Gray Aviles

## 2022-07-28 NOTE — PLAN OF CARE
Problem: PAIN - ADULT  Goal: Verbalizes/displays adequate comfort level or baseline comfort level  Description: Interventions:  - Encourage patient to monitor pain and request assistance  - Assess pain using appropriate pain scale  - Administer analgesics based on type and severity of pain and evaluate response  - Implement non-pharmacological measures as appropriate and evaluate response  - Consider cultural and social influences on pain and pain management  - Notify physician/advanced practitioner if interventions unsuccessful or patient reports new pain  Outcome: Progressing     Problem: INFECTION - ADULT  Goal: Absence or prevention of progression during hospitalization  Description: INTERVENTIONS:  - Assess and monitor for signs and symptoms of infection  - Monitor lab/diagnostic results  - Monitor all insertion sites, i e  indwelling lines, tubes, and drains  - Monitor endotracheal if appropriate and nasal secretions for changes in amount and color  - [place] appropriate cooling/warming therapies per order  - Administer medications as ordered  - Instruct and encourage patient and family to use good hand hygiene technique  - Identify and instruct in appropriate isolation precautions for identified infection/condition  Outcome: Progressing     Problem: SAFETY ADULT  Goal: Patient will remain free of falls  Description: INTERVENTIONS:  - Educate patient/family on patient safety including physical limitations  - Instruct patient to call for assistance with activity   - Consult OT/PT to assist with strengthening/mobility   - Keep Call bell within reach  - Keep bed low and locked with side rails adjusted as appropriate  - Keep care items and personal belongings within reach  - Initiate and maintain comfort rounds  - Make Fall Risk Sign visible to staff  - Apply yellow socks and bracelet for high fall risk patients  - Consider moving patient to room near nurses station  Outcome: Progressing  Goal: Maintain or return to baseline ADL function  Description: INTERVENTIONS:  -  Assess patient's ability to carry out ADLs; assess patient's baseline for ADL function and identify physical deficits which impact ability to perform ADLs (bathing, care of mouth/teeth, toileting, grooming, dressing, etc )  - Assess/evaluate cause of self-care deficits   - Assess range of motion  - Assess patient's mobility; develop plan if impaired  - Assess patient's need for assistive devices and provide as appropriate  - Encourage maximum independence but intervene and supervise when necessary  - Involve family in performance of ADLs  - Assess for home care needs following discharge   - Consider OT consult to assist with ADL evaluation and planning for discharge  - Provide patient education as appropriate  Outcome: Progressing  Goal: Maintains/Returns to pre admission functional level  Description: INTERVENTIONS:  - Perform BMAT or MOVE assessment daily    - Set and communicate daily mobility goal to care team and patient/family/caregiver  - Collaborate with rehabilitation services on mobility goals if consulted  - Out of bed for toileting  - Record patient progress and toleration of activity level   Outcome: Progressing     Problem: Knowledge Deficit  Goal: Patient/family/caregiver demonstrates understanding of disease process, treatment plan, medications, and discharge instructions  Description: Complete learning assessment and assess knowledge base    Interventions:  - Provide teaching at level of understanding  - Provide teaching via preferred learning methods  Outcome: Progressing     Problem: DISCHARGE PLANNING  Goal: Discharge to home or other facility with appropriate resources  Description: INTERVENTIONS:  - Identify barriers to discharge w/patient and caregiver  - Arrange for needed discharge resources and transportation as appropriate  - Identify discharge learning needs (meds, wound care, etc )  - Arrange for interpretive services to assist at discharge as needed  - Refer to Case Management Department for coordinating discharge planning if the patient needs post-hospital services based on physician/advanced practitioner order or complex needs related to functional status, cognitive ability, or social support system  Outcome: Progressing     Problem: POSTPARTUM  Goal: Experiences normal postpartum course  Description: INTERVENTIONS:  - Monitor maternal vital signs  - Assess uterine involution and lochia  Outcome: Progressing  Goal: Appropriate maternal -  bonding  Description: INTERVENTIONS:  - Identify family support  - Assess for appropriate maternal/infant bonding   -Encourage maternal/infant bonding opportunities  - Referral to  or  as needed  Outcome: Progressing  Goal: Establishment of infant feeding pattern  Description: INTERVENTIONS:  - Assess breast/bottle feeding  - Refer to lactation as needed  Outcome: Progressing  Goal: Incision(s), wounds(s) or drain site(s) healing without S/S of infection  Description: INTERVENTIONS  - Assess and document dressing, incision, wound bed, drain sites and surrounding tissue  - Provide patient and family education  Outcome: Progressing

## 2022-07-29 PROCEDURE — 99024 POSTOP FOLLOW-UP VISIT: CPT | Performed by: STUDENT IN AN ORGANIZED HEALTH CARE EDUCATION/TRAINING PROGRAM

## 2022-07-29 RX ORDER — OXYCODONE HYDROCHLORIDE 5 MG/1
10 TABLET ORAL EVERY 4 HOURS PRN
Status: DISCONTINUED | OUTPATIENT
Start: 2022-07-29 | End: 2022-08-01 | Stop reason: HOSPADM

## 2022-07-29 RX ORDER — IBUPROFEN 600 MG/1
600 TABLET ORAL EVERY 6 HOURS PRN
Status: DISCONTINUED | OUTPATIENT
Start: 2022-07-29 | End: 2022-07-31

## 2022-07-29 RX ORDER — OXYCODONE HYDROCHLORIDE 5 MG/1
5 TABLET ORAL EVERY 4 HOURS PRN
Status: DISCONTINUED | OUTPATIENT
Start: 2022-07-29 | End: 2022-08-01 | Stop reason: HOSPADM

## 2022-07-29 RX ADMIN — FERROUS SULFATE TAB 325 MG (65 MG ELEMENTAL FE) 325 MG: 325 (65 FE) TAB at 08:21

## 2022-07-29 RX ADMIN — DOCUSATE SODIUM 100 MG: 100 CAPSULE, LIQUID FILLED ORAL at 18:05

## 2022-07-29 RX ADMIN — OXYCODONE HYDROCHLORIDE 10 MG: 5 TABLET ORAL at 13:28

## 2022-07-29 RX ADMIN — FAMOTIDINE 20 MG: 20 TABLET ORAL at 08:21

## 2022-07-29 RX ADMIN — ACETAMINOPHEN 650 MG: 325 TABLET, FILM COATED ORAL at 22:06

## 2022-07-29 RX ADMIN — ACETAMINOPHEN 650 MG: 325 TABLET, FILM COATED ORAL at 11:58

## 2022-07-29 RX ADMIN — HYDROMORPHONE HYDROCHLORIDE 1 MG: 1 INJECTION, SOLUTION INTRAMUSCULAR; INTRAVENOUS; SUBCUTANEOUS at 04:38

## 2022-07-29 RX ADMIN — IBUPROFEN 600 MG: 600 TABLET ORAL at 02:27

## 2022-07-29 RX ADMIN — OXYCODONE HYDROCHLORIDE 10 MG: 5 TABLET ORAL at 19:32

## 2022-07-29 RX ADMIN — ACETAMINOPHEN 650 MG: 325 TABLET, FILM COATED ORAL at 16:23

## 2022-07-29 RX ADMIN — IBUPROFEN 600 MG: 600 TABLET ORAL at 08:22

## 2022-07-29 RX ADMIN — FAMOTIDINE 20 MG: 20 TABLET ORAL at 18:05

## 2022-07-29 RX ADMIN — DOCUSATE SODIUM 100 MG: 100 CAPSULE, LIQUID FILLED ORAL at 08:21

## 2022-07-29 NOTE — PROGRESS NOTES
Progress Note - OB/GYN  Maryan Olivares 23 y o  female MRN: [id number]  Unit/Bed#: -01 Encounter: [id number]    Assessment and [address] is a patient of: [facility]  She is PPD# 1 s/p  primary  section, classical incision  Recovering well and is stable        premature rupture of membranes with onset of labor more than 24 hours following rupture  Assessment & Plan  Baby has NG tube  Born at 29w  In NICU    * Delivery by classical  section  Assessment & Plan  , Hgb 10 7 --> 9 1, Venofer given  Lines: jade out, UOP 1 3 cc/kg/hr  Pain: Tylenol and toradol scheduled, che 5/10 PRN, pain feels better at incision site than yesterday  FEN: Tolerating regular diet  DVT ppx: SCDs and ambulation  Passing flatus   Incision C/D/I           Disposition    - Anticipate discharge home on PPD# 2-4      Subjective/Objective     Chief Complaint: Postpartum State     Subjective:    Maryan Olivares is POD#1 s/p  primary  section, classical incision  She has some burning at incision site on left side  Pain is well controlled  Patient is currently voiding  She is ambulating  Patient is currently passing flatus and has had no bowel movement  She is tolerating PO, and denies nausea or vomitting  Patient denies fever, chills, chest pain, shortness of breath, or calf tenderness  Lochia is normal  She is  Bottle feeding  She is recovering well and is stable         Vitals:   BP 98/55 (BP Location: Left arm)   Pulse 87   Temp 97 9 °F (36 6 °C) (Oral)   Resp 16   Ht 6' (1 829 m)   Wt 81 6 kg (180 lb)   LMP 2021   SpO2 100%   Breastfeeding Yes   BMI 24 41 kg/m²       Intake/Output Summary (Last 24 hours) at 2022 0641  Last data filed at 2022 0445  Gross per 24 hour   Intake 1520 83 ml   Output 2250 ml   Net -729 17 ml       Invasive Devices  Timeline    Peripheral Intravenous Line  Duration           Peripheral IV 22 Left Antecubital 1 day Physical Exam:   GEN: Elio Lamprey appears well, alert and oriented x 3, pleasant and cooperative   CARDIO: RRR, no murmurs or rubs  RESP:  CTAB, no wheezes or rales  ABDOMEN: soft, no tenderness, no distention, fundus @ -1, Incision C/D/I  EXTREMITIES: SCDs on, non tender, no erythema, b/l Cynthia's sign negative      Labs:     Hemoglobin   Date Value Ref Range Status   07/28/2022 9 1 (L) 11 5 - 15 4 g/dL Final   07/18/2022 10 7 (L) 11 5 - 15 4 g/dL Final   06/23/2017 13 8 11 1 - 15 9 g/dL Final     WBC   Date Value Ref Range Status   07/28/2022 26 13 (H) 4 31 - 10 16 Thousand/uL Final   07/18/2022 17 72 (H) 4 31 - 10 16 Thousand/uL Final   06/23/2017 7 9 3 4 - 10 8 x10E3/uL Final     Platelets   Date Value Ref Range Status   07/28/2022 235 149 - 390 Thousands/uL Final   07/18/2022 244 149 - 390 Thousands/uL Final   06/23/2017 254 150 - 379 x10E3/uL Final     Creatinine   Date Value Ref Range Status   07/18/2022 0 51 (L) 0 60 - 1 30 mg/dL Final     Comment:     Standardized to IDMS reference method   07/18/2022 0 57 (L) 0 60 - 1 30 mg/dL Final     Comment:     Standardized to IDMS reference method     AST   Date Value Ref Range Status   07/18/2022 14 13 - 39 U/L Final     Comment:     Specimen collection should occur prior to Sulfasalazine administration due to the potential for falsely depressed results  07/18/2022 14 5 - 45 U/L Final     Comment:     Specimen collection should occur prior to Sulfasalazine administration due to the potential for falsely depressed results  11/12/2021 30 10 - 30 U/L Final   09/21/2021 25 10 - 30 U/L Final     ALT   Date Value Ref Range Status   07/18/2022 11 7 - 52 U/L Final     Comment:     Specimen collection should occur prior to Sulfasalazine administration due to the potential for falsely depressed results      07/18/2022 20 12 - 78 U/L Final     Comment:     Specimen collection should occur prior to Sulfasalazine administration due to the potential for falsely depressed results      11/12/2021 36 (H) 6 - 29 U/L Final   09/21/2021 24 6 - 29 U/L Final          Yasemin Costa MD  7/29/2022  6:41 AM

## 2022-07-29 NOTE — PLAN OF CARE
Problem: PAIN - ADULT  Goal: Verbalizes/displays adequate comfort level or baseline comfort level  Description: Interventions:  - Encourage patient to monitor pain and request assistance  - Assess pain using appropriate pain scale  - Administer analgesics based on type and severity of pain and evaluate response  - Implement non-pharmacological measures as appropriate and evaluate response  - Consider cultural and social influences on pain and pain management  - Notify physician/advanced practitioner if interventions unsuccessful or patient reports new pain  Outcome: Progressing     Problem: INFECTION - ADULT  Goal: Absence or prevention of progression during hospitalization  Description: INTERVENTIONS:  - Assess and monitor for signs and symptoms of infection  - Monitor lab/diagnostic results  - Monitor all insertion sites, i e  indwelling lines, tubes, and drains  - Monitor endotracheal if appropriate and nasal secretions for changes in amount and color  - [place] appropriate cooling/warming therapies per order  - Administer medications as ordered  - Instruct and encourage patient and family to use good hand hygiene technique  - Identify and instruct in appropriate isolation precautions for identified infection/condition  Outcome: Progressing     Problem: SAFETY ADULT  Goal: Patient will remain free of falls  Description: INTERVENTIONS:  - Educate patient/family on patient safety including physical limitations  - Instruct patient to call for assistance with activity   - Consult OT/PT to assist with strengthening/mobility   - Keep Call bell within reach  - Keep bed low and locked with side rails adjusted as appropriate  - Keep care items and personal belongings within reach  - Initiate and maintain comfort rounds  - Make Fall Risk Sign visible to staff  - Apply yellow socks and bracelet for high fall risk patients  - Consider moving patient to room near nurses station  Outcome: Progressing  Goal: Maintain or return to baseline ADL function  Description: INTERVENTIONS:  -  Assess patient's ability to carry out ADLs; assess patient's baseline for ADL function and identify physical deficits which impact ability to perform ADLs (bathing, care of mouth/teeth, toileting, grooming, dressing, etc )  - Assess/evaluate cause of self-care deficits   - Assess range of motion  - Assess patient's mobility; develop plan if impaired  - Assess patient's need for assistive devices and provide as appropriate  - Encourage maximum independence but intervene and supervise when necessary  - Involve family in performance of ADLs  - Assess for home care needs following discharge   - Consider OT consult to assist with ADL evaluation and planning for discharge  - Provide patient education as appropriate  Outcome: Progressing  Goal: Maintains/Returns to pre admission functional level  Description: INTERVENTIONS:  - Perform BMAT or MOVE assessment daily    - Set and communicate daily mobility goal to care team and patient/family/caregiver  - Collaborate with rehabilitation services on mobility goals if consulted  - Out of bed for toileting  - Record patient progress and toleration of activity level   Outcome: Progressing     Problem: Knowledge Deficit  Goal: Patient/family/caregiver demonstrates understanding of disease process, treatment plan, medications, and discharge instructions  Description: Complete learning assessment and assess knowledge base    Interventions:  - Provide teaching at level of understanding  - Provide teaching via preferred learning methods  Outcome: Progressing     Problem: DISCHARGE PLANNING  Goal: Discharge to home or other facility with appropriate resources  Description: INTERVENTIONS:  - Identify barriers to discharge w/patient and caregiver  - Arrange for needed discharge resources and transportation as appropriate  - Identify discharge learning needs (meds, wound care, etc )  - Arrange for interpretive services to assist at discharge as needed  - Refer to Case Management Department for coordinating discharge planning if the patient needs post-hospital services based on physician/advanced practitioner order or complex needs related to functional status, cognitive ability, or social support system  Outcome: Progressing     Problem: POSTPARTUM  Goal: Experiences normal postpartum course  Description: INTERVENTIONS:  - Monitor maternal vital signs  - Assess uterine involution and lochia  Outcome: Progressing  Goal: Appropriate maternal -  bonding  Description: INTERVENTIONS:  - Identify family support  - Assess for appropriate maternal/infant bonding   -Encourage maternal/infant bonding opportunities  - Referral to  or  as needed  Outcome: Progressing  Goal: Establishment of infant feeding pattern  Description: INTERVENTIONS:  - Assess breast/bottle feeding  - Refer to lactation as needed  Outcome: Progressing  Goal: Incision(s), wounds(s) or drain site(s) healing without S/S of infection  Description: INTERVENTIONS  - Assess and document dressing, incision, wound bed, drain sites and surrounding tissue  - Provide patient and family education  Outcome: Progressing

## 2022-07-29 NOTE — ASSESSMENT & PLAN NOTE
, Hgb 10 7 --> 9 1, Venofer given  Lines: jade out, VT  Pain: Tylenol and toradol scheduled, che 5/10 PRN, feeling abdominal pain  FEN: Tolerating regular diet  DVT ppx: SCDs and ambulation  Passing flatus   Incision C/D/I

## 2022-07-30 PROBLEM — G43.909 MIGRAINE: Status: ACTIVE | Noted: 2022-07-30

## 2022-07-30 PROCEDURE — 99024 POSTOP FOLLOW-UP VISIT: CPT | Performed by: OBSTETRICS & GYNECOLOGY

## 2022-07-30 RX ORDER — METOCLOPRAMIDE HYDROCHLORIDE 5 MG/ML
10 INJECTION INTRAMUSCULAR; INTRAVENOUS EVERY 6 HOURS PRN
Status: DISCONTINUED | OUTPATIENT
Start: 2022-07-30 | End: 2022-08-01 | Stop reason: HOSPADM

## 2022-07-30 RX ORDER — DIPHENHYDRAMINE HYDROCHLORIDE 50 MG/ML
25 INJECTION INTRAMUSCULAR; INTRAVENOUS EVERY 6 HOURS PRN
Status: DISCONTINUED | OUTPATIENT
Start: 2022-07-30 | End: 2022-08-01 | Stop reason: HOSPADM

## 2022-07-30 RX ADMIN — OXYCODONE HYDROCHLORIDE 5 MG: 5 TABLET ORAL at 04:16

## 2022-07-30 RX ADMIN — IBUPROFEN 600 MG: 600 TABLET ORAL at 19:09

## 2022-07-30 RX ADMIN — FAMOTIDINE 20 MG: 20 TABLET ORAL at 09:17

## 2022-07-30 RX ADMIN — ACETAMINOPHEN 650 MG: 325 TABLET, FILM COATED ORAL at 09:16

## 2022-07-30 RX ADMIN — IBUPROFEN 600 MG: 600 TABLET ORAL at 02:09

## 2022-07-30 RX ADMIN — DOCUSATE SODIUM 100 MG: 100 CAPSULE, LIQUID FILLED ORAL at 19:08

## 2022-07-30 RX ADMIN — OXYCODONE HYDROCHLORIDE 5 MG: 5 TABLET ORAL at 12:34

## 2022-07-30 RX ADMIN — FERROUS SULFATE TAB 325 MG (65 MG ELEMENTAL FE) 325 MG: 325 (65 FE) TAB at 07:33

## 2022-07-30 RX ADMIN — IBUPROFEN 600 MG: 600 TABLET ORAL at 10:22

## 2022-07-30 RX ADMIN — ACETAMINOPHEN 650 MG: 325 TABLET, FILM COATED ORAL at 22:00

## 2022-07-30 RX ADMIN — ACETAMINOPHEN 650 MG: 325 TABLET, FILM COATED ORAL at 15:48

## 2022-07-30 RX ADMIN — ACETAMINOPHEN 650 MG: 325 TABLET, FILM COATED ORAL at 04:16

## 2022-07-30 RX ADMIN — FAMOTIDINE 20 MG: 20 TABLET ORAL at 19:08

## 2022-07-30 RX ADMIN — DOCUSATE SODIUM 100 MG: 100 CAPSULE, LIQUID FILLED ORAL at 09:17

## 2022-07-30 NOTE — CASE MANAGEMENT
Case Management Progress Note    Patient name Jennifer Love  Location /-63 MRN [id number]  : 1995 Date 2022       LOS (days): 12  Geometric Mean LOS (GMLOS) (days):   Days to GMLOS:        OBJECTIVE:        Current admission status: Inpatient  Preferred Pharmacy:   [facility]/pharmacy #[id number]  [facility]  Phone: [phone] Fax: [phone]    Primary Care Provider: Theron Riggins DO    Primary Insurance: Evelio Cheeks  Secondary Insurance:     PROGRESS NOTE:    CM consult received for PPD score of 11 - to floor to speak with patient, but she's not currently in the room  Will follow up tomorrow as no discharge anticipated for today

## 2022-07-30 NOTE — ASSESSMENT & PLAN NOTE
Migraine started at 2 am this morning  Was not relieved with tylenol, motrin, or umang  Prescribed reglan, benadryl, and caffeine  F/u pain

## 2022-07-30 NOTE — PLAN OF CARE
Problem: PAIN - ADULT  Goal: Verbalizes/displays adequate comfort level or baseline comfort level  Description: Interventions:  - Encourage patient to monitor pain and request assistance  - Assess pain using appropriate pain scale  - Administer analgesics based on type and severity of pain and evaluate response  - Implement non-pharmacological measures as appropriate and evaluate response  - Consider cultural and social influences on pain and pain management  - Notify physician/advanced practitioner if interventions unsuccessful or patient reports new pain  Outcome: Progressing     Problem: INFECTION - ADULT  Goal: Absence or prevention of progression during hospitalization  Description: INTERVENTIONS:  - Assess and monitor for signs and symptoms of infection  - Monitor lab/diagnostic results  - Monitor all insertion sites, i e  indwelling lines, tubes, and drains  - Monitor endotracheal if appropriate and nasal secretions for changes in amount and color  - [place] appropriate cooling/warming therapies per order  - Administer medications as ordered  - Instruct and encourage patient and family to use good hand hygiene technique  - Identify and instruct in appropriate isolation precautions for identified infection/condition  Outcome: Progressing     Problem: SAFETY ADULT  Goal: Patient will remain free of falls  Description: INTERVENTIONS:  - Educate patient/family on patient safety including physical limitations  - Instruct patient to call for assistance with activity   - Consult OT/PT to assist with strengthening/mobility   - Keep Call bell within reach  - Keep bed low and locked with side rails adjusted as appropriate  - Keep care items and personal belongings within reach  - Initiate and maintain comfort rounds  - Make Fall Risk Sign visible to staff  - Apply yellow socks and bracelet for high fall risk patients  - Consider moving patient to room near nurses station  Outcome: Progressing  Goal: Maintain or return to baseline ADL function  Description: INTERVENTIONS:  -  Assess patient's ability to carry out ADLs; assess patient's baseline for ADL function and identify physical deficits which impact ability to perform ADLs (bathing, care of mouth/teeth, toileting, grooming, dressing, etc )  - Assess/evaluate cause of self-care deficits   - Assess range of motion  - Assess patient's mobility; develop plan if impaired  - Assess patient's need for assistive devices and provide as appropriate  - Encourage maximum independence but intervene and supervise when necessary  - Involve family in performance of ADLs  - Assess for home care needs following discharge   - Consider OT consult to assist with ADL evaluation and planning for discharge  - Provide patient education as appropriate  Outcome: Progressing  Goal: Maintains/Returns to pre admission functional level  Description: INTERVENTIONS:  - Perform BMAT or MOVE assessment daily    - Set and communicate daily mobility goal to care team and patient/family/caregiver  - Collaborate with rehabilitation services on mobility goals if consulted  - Out of bed for toileting  - Record patient progress and toleration of activity level   Outcome: Progressing     Problem: Knowledge Deficit  Goal: Patient/family/caregiver demonstrates understanding of disease process, treatment plan, medications, and discharge instructions  Description: Complete learning assessment and assess knowledge base    Interventions:  - Provide teaching at level of understanding  - Provide teaching via preferred learning methods  Outcome: Progressing     Problem: DISCHARGE PLANNING  Goal: Discharge to home or other facility with appropriate resources  Description: INTERVENTIONS:  - Identify barriers to discharge w/patient and caregiver  - Arrange for needed discharge resources and transportation as appropriate  - Identify discharge learning needs (meds, wound care, etc )  - Arrange for interpretive services to assist at discharge as needed  - Refer to Case Management Department for coordinating discharge planning if the patient needs post-hospital services based on physician/advanced practitioner order or complex needs related to functional status, cognitive ability, or social support system  Outcome: Progressing     Problem: POSTPARTUM  Goal: Experiences normal postpartum course  Description: INTERVENTIONS:  - Monitor maternal vital signs  - Assess uterine involution and lochia  Outcome: Progressing  Goal: Appropriate maternal -  bonding  Description: INTERVENTIONS:  - Identify family support  - Assess for appropriate maternal/infant bonding   -Encourage maternal/infant bonding opportunities  - Referral to  or  as needed  Outcome: Progressing  Goal: Establishment of infant feeding pattern  Description: INTERVENTIONS:  - Assess breast/bottle feeding  - Refer to lactation as needed  Outcome: Progressing  Goal: Incision(s), wounds(s) or drain site(s) healing without S/S of infection  Description: INTERVENTIONS  - Assess and document dressing, incision, wound bed, drain sites and surrounding tissue  - Provide patient and family education  Outcome: Progressing

## 2022-07-30 NOTE — QUICK NOTE
Patient was complaining of headache overnight  Reglan,Caffiene and benadryl was ordered  Headache resolved before medication was administered  Patient advised to request medication if headache returns         Leo Davies MD  PGY-1

## 2022-07-30 NOTE — PLAN OF CARE
Problem: PAIN - ADULT  Goal: Verbalizes/displays adequate comfort level or baseline comfort level  Description: Interventions:  - Encourage patient to monitor pain and request assistance  - Assess pain using appropriate pain scale  - Administer analgesics based on type and severity of pain and evaluate response  - Implement non-pharmacological measures as appropriate and evaluate response  - Consider cultural and social influences on pain and pain management  - Notify physician/advanced practitioner if interventions unsuccessful or patient reports new pain  Outcome: Progressing     Problem: INFECTION - ADULT  Goal: Absence or prevention of progression during hospitalization  Description: INTERVENTIONS:  - Assess and monitor for signs and symptoms of infection  - Monitor lab/diagnostic results  - Monitor all insertion sites, i e  indwelling lines, tubes, and drains  - Monitor endotracheal if appropriate and nasal secretions for changes in amount and color  - [place] appropriate cooling/warming therapies per order  - Administer medications as ordered  - Instruct and encourage patient and family to use good hand hygiene technique  - Identify and instruct in appropriate isolation precautions for identified infection/condition  Outcome: Progressing     Problem: SAFETY ADULT  Goal: Patient will remain free of falls  Description: INTERVENTIONS:  - Educate patient/family on patient safety including physical limitations  - Instruct patient to call for assistance with activity   - Consult OT/PT to assist with strengthening/mobility   - Keep Call bell within reach  - Keep bed low and locked with side rails adjusted as appropriate  - Keep care items and personal belongings within reach  - Initiate and maintain comfort rounds  - Make Fall Risk Sign visible to staff  - Apply yellow socks and bracelet for high fall risk patients  - Consider moving patient to room near nurses station  Outcome: Progressing  Goal: Maintain or return to baseline ADL function  Description: INTERVENTIONS:  -  Assess patient's ability to carry out ADLs; assess patient's baseline for ADL function and identify physical deficits which impact ability to perform ADLs (bathing, care of mouth/teeth, toileting, grooming, dressing, etc )  - Assess/evaluate cause of self-care deficits   - Assess range of motion  - Assess patient's mobility; develop plan if impaired  - Assess patient's need for assistive devices and provide as appropriate  - Encourage maximum independence but intervene and supervise when necessary  - Involve family in performance of ADLs  - Assess for home care needs following discharge   - Consider OT consult to assist with ADL evaluation and planning for discharge  - Provide patient education as appropriate  Outcome: Progressing  Goal: Maintains/Returns to pre admission functional level  Description: INTERVENTIONS:  - Perform BMAT or MOVE assessment daily    - Set and communicate daily mobility goal to care team and patient/family/caregiver  - Collaborate with rehabilitation services on mobility goals if consulted  - Out of bed for toileting  - Record patient progress and toleration of activity level   Outcome: Progressing     Problem: Knowledge Deficit  Goal: Patient/family/caregiver demonstrates understanding of disease process, treatment plan, medications, and discharge instructions  Description: Complete learning assessment and assess knowledge base    Interventions:  - Provide teaching at level of understanding  - Provide teaching via preferred learning methods  Outcome: Progressing     Problem: DISCHARGE PLANNING  Goal: Discharge to home or other facility with appropriate resources  Description: INTERVENTIONS:  - Identify barriers to discharge w/patient and caregiver  - Arrange for needed discharge resources and transportation as appropriate  - Identify discharge learning needs (meds, wound care, etc )  - Arrange for interpretive services to assist at discharge as needed  - Refer to Case Management Department for coordinating discharge planning if the patient needs post-hospital services based on physician/advanced practitioner order or complex needs related to functional status, cognitive ability, or social support system  Outcome: Progressing     Problem: POSTPARTUM  Goal: Experiences normal postpartum course  Description: INTERVENTIONS:  - Monitor maternal vital signs  - Assess uterine involution and lochia  Outcome: Progressing  Goal: Appropriate maternal -  bonding  Description: INTERVENTIONS:  - Identify family support  - Assess for appropriate maternal/infant bonding   -Encourage maternal/infant bonding opportunities  - Referral to  or  as needed  Outcome: Progressing  Goal: Establishment of infant feeding pattern  Description: INTERVENTIONS:  - Assess breast/bottle feeding  - Refer to lactation as needed  Outcome: Progressing  Goal: Incision(s), wounds(s) or drain site(s) healing without S/S of infection  Description: INTERVENTIONS  - Assess and document dressing, incision, wound bed, drain sites and surrounding tissue  - Provide patient and family education  Outcome: Progressing

## 2022-07-30 NOTE — PROGRESS NOTES
Progress Note - OB/GYN  Luis Manuel Silva 23 y o  female MRN: [id number]  Unit/Bed#: -01 Encounter: [id number]    Assessment and [address] is a patient of: [facility]  She is PPD# 2 s/p  primary  section, classical incision at 29 weeks  Recovering well and is stable       Migraine  Assessment & Plan  Migraine started at 2 am this morning  Was not relieved with tylenol, motrin, or umang  Prescribed reglan, benadryl, and caffeine  F/u pain     premature rupture of membranes with onset of labor more than 24 hours following rupture  Assessment & Plan  Baby has NG tube  Born at 29w  In NICU    * Delivery by classical  section  Assessment & Plan  , Hgb 10 7 --> 9 1, Venofer given  Lines: jade out, UOP 1 3 cc/kg/hr  Pain: Tylenol and toradol scheduled, che 5/10 PRN, pain feels better at incision site than yesterday  FEN: Tolerating regular diet  DVT ppx: SCDs and ambulation  Passing flatus   Incision C/D/I           Disposition    - Anticipate discharge home on PPD# 2-4      Subjective/Objective     Chief Complaint: Postpartum State     Subjective:    Luis Manuel Silva is POD#2 s/p  primary  section, classical incision  She is currently complaining of a migraine which started at 2 am today  Pain is otherwise well controlled  Patient is currently voiding  She is ambulating  Patient is currently passing flatus and has had no bowel movement  She is tolerating PO, and denies nausea or vomitting  Patient denies fever, chills, chest pain, shortness of breath, or calf tenderness  Lochia is normal  She is  pumping  She is recovering well and is stable         Vitals:   /58 (BP Location: Right arm)   Pulse 90   Temp 98 3 °F (36 8 °C) (Oral)   Resp 18   Ht 6' (1 829 m)   Wt 81 6 kg (180 lb)   LMP 2021   SpO2 98%   Breastfeeding Yes   BMI 24 41 kg/m²     No intake or output data in the 24 hours ending 22 0643    Invasive Devices  Timeline None                 Physical Exam:   GEN: Troy Estes appears well, alert and oriented x 3, pleasant and cooperative   CARDIO: RRR, no murmurs or rubs  RESP:  CTAB, no wheezes or rales  ABDOMEN: soft, no tenderness, no distention, fundus @ -1, Incision C/D/I  EXTREMITIES: SCDs on, non tender, no erythema, b/l Cynthia's sign negative      Labs:     Hemoglobin   Date Value Ref Range Status   07/28/2022 9 1 (L) 11 5 - 15 4 g/dL Final   07/18/2022 10 7 (L) 11 5 - 15 4 g/dL Final   06/23/2017 13 8 11 1 - 15 9 g/dL Final     WBC   Date Value Ref Range Status   07/28/2022 26 13 (H) 4 31 - 10 16 Thousand/uL Final   07/18/2022 17 72 (H) 4 31 - 10 16 Thousand/uL Final   06/23/2017 7 9 3 4 - 10 8 x10E3/uL Final     Platelets   Date Value Ref Range Status   07/28/2022 235 149 - 390 Thousands/uL Final   07/18/2022 244 149 - 390 Thousands/uL Final   06/23/2017 254 150 - 379 x10E3/uL Final     Creatinine   Date Value Ref Range Status   07/18/2022 0 51 (L) 0 60 - 1 30 mg/dL Final     Comment:     Standardized to IDMS reference method   07/18/2022 0 57 (L) 0 60 - 1 30 mg/dL Final     Comment:     Standardized to IDMS reference method     AST   Date Value Ref Range Status   07/18/2022 14 13 - 39 U/L Final     Comment:     Specimen collection should occur prior to Sulfasalazine administration due to the potential for falsely depressed results  07/18/2022 14 5 - 45 U/L Final     Comment:     Specimen collection should occur prior to Sulfasalazine administration due to the potential for falsely depressed results  11/12/2021 30 10 - 30 U/L Final   09/21/2021 25 10 - 30 U/L Final     ALT   Date Value Ref Range Status   07/18/2022 11 7 - 52 U/L Final     Comment:     Specimen collection should occur prior to Sulfasalazine administration due to the potential for falsely depressed results      07/18/2022 20 12 - 78 U/L Final     Comment:     Specimen collection should occur prior to Sulfasalazine administration due to the potential for falsely depressed results      11/12/2021 36 (H) 6 - 29 U/L Final   09/21/2021 24 6 - 29 U/L Final          Keysah Oneal MD  7/30/2022  6:43 AM

## 2022-07-31 PROCEDURE — 99024 POSTOP FOLLOW-UP VISIT: CPT | Performed by: STUDENT IN AN ORGANIZED HEALTH CARE EDUCATION/TRAINING PROGRAM

## 2022-07-31 RX ORDER — IBUPROFEN 600 MG/1
600 TABLET ORAL EVERY 6 HOURS SCHEDULED
Status: DISCONTINUED | OUTPATIENT
Start: 2022-07-31 | End: 2022-08-01 | Stop reason: HOSPADM

## 2022-07-31 RX ADMIN — DOCUSATE SODIUM 100 MG: 100 CAPSULE, LIQUID FILLED ORAL at 10:33

## 2022-07-31 RX ADMIN — FERROUS SULFATE TAB 325 MG (65 MG ELEMENTAL FE) 325 MG: 325 (65 FE) TAB at 07:37

## 2022-07-31 RX ADMIN — FAMOTIDINE 20 MG: 20 TABLET ORAL at 10:33

## 2022-07-31 RX ADMIN — ACETAMINOPHEN 650 MG: 325 TABLET, FILM COATED ORAL at 18:14

## 2022-07-31 RX ADMIN — ACETAMINOPHEN 650 MG: 325 TABLET, FILM COATED ORAL at 22:18

## 2022-07-31 RX ADMIN — IBUPROFEN 600 MG: 600 TABLET ORAL at 21:17

## 2022-07-31 RX ADMIN — IBUPROFEN 600 MG: 600 TABLET ORAL at 14:21

## 2022-07-31 RX ADMIN — ACETAMINOPHEN 650 MG: 325 TABLET, FILM COATED ORAL at 04:09

## 2022-07-31 RX ADMIN — ACETAMINOPHEN 650 MG: 325 TABLET, FILM COATED ORAL at 10:34

## 2022-07-31 RX ADMIN — FAMOTIDINE 20 MG: 20 TABLET ORAL at 18:14

## 2022-07-31 RX ADMIN — IBUPROFEN 600 MG: 600 TABLET ORAL at 07:37

## 2022-07-31 RX ADMIN — DOCUSATE SODIUM 100 MG: 100 CAPSULE, LIQUID FILLED ORAL at 18:14

## 2022-07-31 NOTE — CASE MANAGEMENT
Case Management Progress Note    Patient name Maryellen Matamroos  Location /-13 MRN [id number]  : 1995 Date 2022       LOS (days): 13  Geometric Mean LOS (GMLOS) (days):   Days to GMLOS:        OBJECTIVE:        Current admission status: Inpatient  Preferred Pharmacy:   [id number]  [facility]  Phone: [phone] Fax: [phone]    Primary Care Provider: Cristofer Philippe DO    Primary Insurance: [facility]  Secondary Insurance:     PROGRESS NOTE:    CM consult received for PPD of 11  Met with patient and her spouse, Mary Ann Esteban, at bedside to complete assessment  Patient reports that she and spouse have everything they need for baby - car seat, crib, etc  Patient relays plan to breast feed and confirms that breast pump was delivered last week  Patient reports that she's been feeling much better, particularly this morning as she was able to hold her son today  States that she has strong support in spouse/family and declines any mental health needs at this time  Patient declining any needs at this time and reports strong support at home  No CM interventions needed at this time

## 2022-07-31 NOTE — PROGRESS NOTES
Progress Note - OB/GYN  Edouard Nicole 23 y o  female MRN: [id number]  Unit/Bed#: -01 Encounter: [id number]    Assessment and [address] is a patient of: [facility]  She is PPD# 3 s/p  primary  section, classical incision  Recovering well and is stable        premature rupture of membranes with onset of labor more than 24 hours following rupture  Assessment & Plan  Baby has NG tube  Born at 29w  In NICU    * Delivery by classical  section  Assessment & Plan  , Hgb 10 7 --> 9 1, Venofer given  Lines: jade out, VT  Pain: Tylenol and toradol scheduled, che 5/10 PRN, feeling abdominal pain  FEN: Tolerating regular diet  DVT ppx: SCDs and ambulation  Passing flatus   Incision C/D/I           Disposition    - Anticipate discharge home on PPD# 3 vs 4      Subjective/Objective     Chief Complaint: Postpartum State     Subjective:    Edouard Nicole is POD#3 s/p  primary  section, classical incision  She has complaints of abdominal pain above her incision  Medications were written as scheduled  Patient is currently voiding  She is ambulating  Patient is currently passing flatus and has had no bowel movement  She is tolerating PO, and denies nausea or vomitting  Patient denies fever, chills, chest pain, shortness of breath, or calf tenderness  Lochia is normal  She is  Using breast pump  She is recovering well and is stable         Vitals:   /75 (BP Location: Left arm)   Pulse 83   Temp 97 9 °F (36 6 °C) (Oral)   Resp 18   Ht 6' (1 829 m)   Wt 81 6 kg (180 lb)   LMP 2021   SpO2 98%   Breastfeeding Yes   BMI 24 41 kg/m²     No intake or output data in the 24 hours ending 22 1854    Invasive Devices  Timeline    None                 Physical Exam:   GEN: Edouard Nicole appears well, alert and oriented x 3, pleasant and cooperative   CARDIO: RRR, no murmurs or rubs  RESP:  CTAB, no wheezes or rales  ABDOMEN: soft, no tenderness, no distention, fundus @ -1, Incision C/D/I  EXTREMITIES: SCDs on, non tender, no erythema, b/l Cynthia's sign negative      Labs:     Hemoglobin   Date Value Ref Range Status   07/28/2022 9 1 (L) 11 5 - 15 4 g/dL Final   07/18/2022 10 7 (L) 11 5 - 15 4 g/dL Final   06/23/2017 13 8 11 1 - 15 9 g/dL Final     WBC   Date Value Ref Range Status   07/28/2022 26 13 (H) 4 31 - 10 16 Thousand/uL Final   07/18/2022 17 72 (H) 4 31 - 10 16 Thousand/uL Final   06/23/2017 7 9 3 4 - 10 8 x10E3/uL Final     Platelets   Date Value Ref Range Status   07/28/2022 235 149 - 390 Thousands/uL Final   07/18/2022 244 149 - 390 Thousands/uL Final   06/23/2017 254 150 - 379 x10E3/uL Final     Creatinine   Date Value Ref Range Status   07/18/2022 0 51 (L) 0 60 - 1 30 mg/dL Final     Comment:     Standardized to IDMS reference method   07/18/2022 0 57 (L) 0 60 - 1 30 mg/dL Final     Comment:     Standardized to IDMS reference method     AST   Date Value Ref Range Status   07/18/2022 14 13 - 39 U/L Final     Comment:     Specimen collection should occur prior to Sulfasalazine administration due to the potential for falsely depressed results  07/18/2022 14 5 - 45 U/L Final     Comment:     Specimen collection should occur prior to Sulfasalazine administration due to the potential for falsely depressed results  11/12/2021 30 10 - 30 U/L Final   09/21/2021 25 10 - 30 U/L Final     ALT   Date Value Ref Range Status   07/18/2022 11 7 - 52 U/L Final     Comment:     Specimen collection should occur prior to Sulfasalazine administration due to the potential for falsely depressed results  07/18/2022 20 12 - 78 U/L Final     Comment:     Specimen collection should occur prior to Sulfasalazine administration due to the potential for falsely depressed results      11/12/2021 36 (H) 6 - 29 U/L Final   09/21/2021 24 6 - 29 U/L Final          Karlee Hernandez MD  7/31/2022  6:54 PM

## 2022-08-01 VITALS
HEIGHT: 72 IN | HEART RATE: 109 BPM | RESPIRATION RATE: 18 BRPM | SYSTOLIC BLOOD PRESSURE: 102 MMHG | TEMPERATURE: 98.1 F | DIASTOLIC BLOOD PRESSURE: 70 MMHG | WEIGHT: 180 LBS | OXYGEN SATURATION: 98 % | BODY MASS INDEX: 24.38 KG/M2

## 2022-08-01 LAB
F2 GENE MUT ANL BLD/T: NORMAL
F5 GENE MUT ANL BLD/T: NORMAL

## 2022-08-01 PROCEDURE — 99024 POSTOP FOLLOW-UP VISIT: CPT | Performed by: OBSTETRICS & GYNECOLOGY

## 2022-08-01 RX ORDER — ACETAMINOPHEN 325 MG/1
650 TABLET ORAL EVERY 6 HOURS
Refills: 0
Start: 2022-08-01

## 2022-08-01 RX ORDER — IBUPROFEN 600 MG/1
600 TABLET ORAL EVERY 6 HOURS SCHEDULED
Qty: 30 TABLET | Refills: 0 | Status: SHIPPED | OUTPATIENT
Start: 2022-08-01

## 2022-08-01 RX ORDER — DIAPER,BRIEF,INFANT-TODD,DISP
1 EACH MISCELLANEOUS DAILY PRN
Qty: 30 G | Refills: 0
Start: 2022-08-01

## 2022-08-01 RX ADMIN — DOCUSATE SODIUM 100 MG: 100 CAPSULE, LIQUID FILLED ORAL at 08:04

## 2022-08-01 RX ADMIN — FAMOTIDINE 20 MG: 20 TABLET ORAL at 08:04

## 2022-08-01 RX ADMIN — FERROUS SULFATE TAB 325 MG (65 MG ELEMENTAL FE) 325 MG: 325 (65 FE) TAB at 07:42

## 2022-08-01 RX ADMIN — ACETAMINOPHEN 650 MG: 325 TABLET, FILM COATED ORAL at 10:01

## 2022-08-01 RX ADMIN — ACETAMINOPHEN 650 MG: 325 TABLET, FILM COATED ORAL at 04:20

## 2022-08-01 RX ADMIN — ACETAMINOPHEN 650 MG: 325 TABLET, FILM COATED ORAL at 15:57

## 2022-08-01 RX ADMIN — IBUPROFEN 600 MG: 600 TABLET ORAL at 12:14

## 2022-08-01 RX ADMIN — IBUPROFEN 600 MG: 600 TABLET ORAL at 06:24

## 2022-08-01 NOTE — PROGRESS NOTES
Progress Note - OB/GYN  Lovely Goins 23 y o  female MRN: [id number]  Unit/Bed#:  301-01 Encounter: [id number]    Assessment and Plan     Lovely Goins is a patient of: [address] [place] [address]  She is PPD# 4 s/p  primary  section, classical incision  Recovering well and is stable       Migraine  Assessment & Plan  Had one on this admission but says it has resolved and that she gets them commonly   premature rupture of membranes with onset of labor more than 24 hours following rupture  Assessment & Plan  Baby has NG tube  Born at 29w  In NICU  Mom going home today    * Delivery by classical  section  Assessment & Plan  , Hgb 10 7 --> 9 1, Venofer given  Lines: jade out, VT  Pain: Tylenol and toradol scheduled, che 5/10 PRN, feeling abdominal pain  FEN: Tolerating regular diet  DVT ppx: SCDs and ambulation  Passing flatus   Incision C/D/I           Disposition    - Anticipate discharge home on PPD# 4      Subjective/Objective     Chief Complaint: Postpartum State     Subjective:    Lovely Goins is POD#4 s/p  primary  section, classical incision  She has no current complaints  Pain is well controlled  Patient is currently voiding  She is ambulating  Patient is currently passing flatus and has had bowel movement  She is tolerating PO, and denies nausea or vomitting  Patient denies fever, chills, chest pain, shortness of breath, or calf tenderness  Lochia is normal  She is  Using breast pump  She is recovering well and is stable         Vitals:   /72   Pulse 69   Temp 98 4 °F (36 9 °C) (Oral)   Resp 18   Ht 6' (1 829 m)   Wt 81 6 kg (180 lb)   LMP 2021   SpO2 98%   Breastfeeding Yes   BMI 24 41 kg/m²     No intake or output data in the 24 hours ending 22 0628    Invasive Devices  Timeline    None                 Physical Exam:   GEN: Lovely Goins appears well, alert and oriented x 3, pleasant and cooperative   CARDIO: RRR, no murmurs or rubs  RESP:  CTAB, no wheezes or rales  ABDOMEN: soft, no tenderness, no distention, fundus @ -2, Incision C/D/I  EXTREMITIES: non tender, no erythema,      Labs:     Hemoglobin   Date Value Ref Range Status   07/28/2022 9 1 (L) 11 5 - 15 4 g/dL Final   07/18/2022 10 7 (L) 11 5 - 15 4 g/dL Final   06/23/2017 13 8 11 1 - 15 9 g/dL Final     WBC   Date Value Ref Range Status   07/28/2022 26 13 (H) 4 31 - 10 16 Thousand/uL Final   07/18/2022 17 72 (H) 4 31 - 10 16 Thousand/uL Final   06/23/2017 7 9 3 4 - 10 8 x10E3/uL Final     Platelets   Date Value Ref Range Status   07/28/2022 235 149 - 390 Thousands/uL Final   07/18/2022 244 149 - 390 Thousands/uL Final   06/23/2017 254 150 - 379 x10E3/uL Final     Creatinine   Date Value Ref Range Status   07/18/2022 0 51 (L) 0 60 - 1 30 mg/dL Final     Comment:     Standardized to IDMS reference method   07/18/2022 0 57 (L) 0 60 - 1 30 mg/dL Final     Comment:     Standardized to IDMS reference method     AST   Date Value Ref Range Status   07/18/2022 14 13 - 39 U/L Final     Comment:     Specimen collection should occur prior to Sulfasalazine administration due to the potential for falsely depressed results  07/18/2022 14 5 - 45 U/L Final     Comment:     Specimen collection should occur prior to Sulfasalazine administration due to the potential for falsely depressed results  11/12/2021 30 10 - 30 U/L Final   09/21/2021 25 10 - 30 U/L Final     ALT   Date Value Ref Range Status   07/18/2022 11 7 - 52 U/L Final     Comment:     Specimen collection should occur prior to Sulfasalazine administration due to the potential for falsely depressed results  07/18/2022 20 12 - 78 U/L Final     Comment:     Specimen collection should occur prior to Sulfasalazine administration due to the potential for falsely depressed results      11/12/2021 36 (H) 6 - 29 U/L Final   09/21/2021 24 6 - 29 U/L Final          Ricky Gomez MD  8/1/2022  6:28 AM

## 2022-08-01 NOTE — PLAN OF CARE
Problem: PAIN - ADULT  Goal: Verbalizes/displays adequate comfort level or baseline comfort level  Description: Interventions:  - Encourage patient to monitor pain and request assistance  - Assess pain using appropriate pain scale  - Administer analgesics based on type and severity of pain and evaluate response  - Implement non-pharmacological measures as appropriate and evaluate response  - Consider cultural and social influences on pain and pain management  - Notify physician/advanced practitioner if interventions unsuccessful or patient reports new pain  Outcome: Adequate for Discharge     Problem: INFECTION - ADULT  Goal: Absence or prevention of progression during hospitalization  Description: INTERVENTIONS:  - Assess and monitor for signs and symptoms of infection  - Monitor lab/diagnostic results  - Monitor all insertion sites, i e  indwelling lines, tubes, and drains  - Monitor endotracheal if appropriate and nasal secretions for changes in amount and color  - [place] appropriate cooling/warming therapies per order  - Administer medications as ordered  - Instruct and encourage patient and family to use good hand hygiene technique  - Identify and instruct in appropriate isolation precautions for identified infection/condition  Outcome: Adequate for Discharge     Problem: SAFETY ADULT  Goal: Patient will remain free of falls  Description: INTERVENTIONS:  - Educate patient/family on patient safety including physical limitations  - Instruct patient to call for assistance with activity   - Consult OT/PT to assist with strengthening/mobility   - Keep Call bell within reach  - Keep bed low and locked with side rails adjusted as appropriate  - Keep care items and personal belongings within reach  - Initiate and maintain comfort rounds  - Make Fall Risk Sign visible to staff  - Apply yellow socks and bracelet for high fall risk patients  - Consider moving patient to room near nurses station  Outcome: Adequate for Discharge  Goal: Maintain or return to baseline ADL function  Description: INTERVENTIONS:  -  Assess patient's ability to carry out ADLs; assess patient's baseline for ADL function and identify physical deficits which impact ability to perform ADLs (bathing, care of mouth/teeth, toileting, grooming, dressing, etc )  - Assess/evaluate cause of self-care deficits   - Assess range of motion  - Assess patient's mobility; develop plan if impaired  - Assess patient's need for assistive devices and provide as appropriate  - Encourage maximum independence but intervene and supervise when necessary  - Involve family in performance of ADLs  - Assess for home care needs following discharge   - Consider OT consult to assist with ADL evaluation and planning for discharge  - Provide patient education as appropriate  Outcome: Adequate for Discharge  Goal: Maintains/Returns to pre admission functional level  Description: INTERVENTIONS:  - Perform BMAT or MOVE assessment daily    - Set and communicate daily mobility goal to care team and patient/family/caregiver  - Collaborate with rehabilitation services on mobility goals if consulted  - Out of bed for toileting  - Record patient progress and toleration of activity level   Outcome: Adequate for Discharge     Problem: Knowledge Deficit  Goal: Patient/family/caregiver demonstrates understanding of disease process, treatment plan, medications, and discharge instructions  Description: Complete learning assessment and assess knowledge base    Interventions:  - Provide teaching at level of understanding  - Provide teaching via preferred learning methods  Outcome: Adequate for Discharge     Problem: DISCHARGE PLANNING  Goal: Discharge to home or other facility with appropriate resources  Description: INTERVENTIONS:  - Identify barriers to discharge w/patient and caregiver  - Arrange for needed discharge resources and transportation as appropriate  - Identify discharge learning needs (meds, wound care, etc )  - Arrange for interpretive services to assist at discharge as needed  - Refer to Case Management Department for coordinating discharge planning if the patient needs post-hospital services based on physician/advanced practitioner order or complex needs related to functional status, cognitive ability, or social support system  Outcome: Adequate for Discharge     Problem: POSTPARTUM  Goal: Experiences normal postpartum course  Description: INTERVENTIONS:  - Monitor maternal vital signs  - Assess uterine involution and lochia  Outcome: Adequate for Discharge  Goal: Appropriate maternal -  bonding  Description: INTERVENTIONS:  - Identify family support  - Assess for appropriate maternal/infant bonding   -Encourage maternal/infant bonding opportunities  - Referral to  or  as needed  Outcome: Adequate for Discharge  Goal: Establishment of infant feeding pattern  Description: INTERVENTIONS:  - Assess breast/bottle feeding  - Refer to lactation as needed  Outcome: Adequate for Discharge  Goal: Incision(s), wounds(s) or drain site(s) healing without S/S of infection  Description: INTERVENTIONS  - Assess and document dressing, incision, wound bed, drain sites and surrounding tissue  - Provide patient and family education  Outcome: Adequate for Discharge

## 2022-08-01 NOTE — UTILIZATION REVIEW
Continued Stay Review    Date: 22                       Current Patient Class: inpatient  Current Level of Care: M/S    HPI:26 y o  female initially admitted on *22    Assessment/Plan: POD # 3  She has complaints of abdominal pain above her incision  Medications were written as scheduled  Patient is currently voiding  She is ambulating  Patient is currently passing flatus and has had no bowel movement  She is tolerating PO, and denies nausea or vomitting  Patient denies fever, chills, chest pain, shortness of breath, or calf tenderness  Lochia is normal  She is  Using breast pump   She is recovering well and is stable    Vital Signs:   Date/Time Temp Pulse Resp BP MAP (mmHg) SpO2 O2 Device Cardiac (WDL) Patient Position - Orthostatic VS   22 2340 98 4 °F (36 9 °C) 69 18 107/72 -- -- -- -- --   22 1814 97 9 °F (36 6 °C) 87 18 104/74 84 -- None (Room air) -- Sitting   22 0742 97 9 °F (36 6 °C) 83 18 114/75 -- 98 % None (Room air) -- Sitting   22 0737 -- -- -- -- -- -- None (Room air) -- --   22 0000 98 1 °F (36 7 °C) 87 18 107/69 -- -- -- -- --   22 1930 -- -- -- -- -- -- -- WDL --   22 1902 98 5 °F (36 9 °C) 83 18 104/70 -- 100 % -- --        Pertinent Labs/Diagnostic Results:       Results from last 7 days   Lab Units 22  0859   WBC Thousand/uL 26 13*   HEMOGLOBIN g/dL 9 1*   HEMATOCRIT % 26 1*   PLATELETS Thousands/uL 235   NEUTROS ABS Thousands/µL 22 22*         Medications:   Scheduled Medications:  acetaminophen, 650 mg, Oral, Q6H  docusate sodium, 100 mg, Oral, BID  famotidine, 20 mg, Oral, BID  ferrous sulfate, 325 mg, Oral, Daily With Breakfast  ibuprofen, 600 mg, Oral, Q6H [place]      Continuous IV Infusions:     PRN Meds:  albuterol, 2 5 mg, Nebulization, Once PRN  benzocaine-menthol-lanolin-aloe, 1 application, Topical, Y6M PRN  calcium carbonate, 1,000 mg, Oral, BID PRN  calcium carbonate, 1,000 mg, Oral, Daily PRN  diphenhydrAMINE, 25 mg, Intravenous, Q6H PRN  diphenhydrAMINE, 25 mg, Oral, Q6H PRN  fentaNYL, 50 mcg, Intravenous, Q3 min PRN  hydrocortisone, 1 application, Topical, Daily PRN  HYDROmorphone, 0 5 mg, Intravenous, Q5 Min PRN  HYDROmorphone, 0 5 mg, Intravenous, Q1H PRN  HYDROmorphone, 1 mg, Intravenous, Q2H PRN  hydrOXYzine HCL, 25 mg, Oral, Q6H PRN  melatonin, 3 mg, Oral, HS PRN  metoclopramide, 10 mg, Intravenous, Q6H PRN  nalbuphine, 10 mg, Intravenous, Q1H PRN  ondansetron, 4 mg, Intravenous, Once PRN  ondansetron, 4 mg, Intravenous, Q8H PRN  oxyCODONE, 10 mg, Oral, Q4H PRN  oxyCODONE, 5 mg, Oral, Q4H PRN  polyethylene glycol, 17 g, Oral, Daily PRN  simethicone, 80 mg, Oral, Q6H PRN  simethicone, 80 mg, Oral, 4x Daily PRN  witch hazel-glycerin, 1 pad, Topical, Q4H PRN        Discharge Plan: home when medically cleared    Network Utilization Review Department  ATTENTION: Please call with any questions or concerns to [phone] and carefully listen to the prompts so that you are directed to the right person  All voicemails are confidential   Maeve Thomas all requests for admission clinical reviews, approved or denied determinations and any other requests to dedicated fax number below belonging to the campus where the patient is receiving treatment   List of dedicated fax numbers for the Facilities:  [address] DENIALS (Administrative/Medical Necessity) [phone]   [address] (Maternity/NICU/Pediatrics) [phone] [address] [place] [address]  [address] [facility] [address] [address] [place] [address] [phone]     [address]  [place] [address] [address] [place] [address] [phone]

## 2022-08-01 NOTE — UTILIZATION REVIEW
Continued Stay Review    Date: 22                          Current Patient Class: inpatient   Current Level of Care: M/S    HPI:26 y o  female initially admitted on 22     Assessment/Plan:labor by the dilation and change in her cervix would recommend proceeding with delivery at this time   Post-Op Diagnosis Codes:     * Premature rupture of membranes [O42 90]  Procedure(s) (LRB):   SECTION () 22 @   29 5/7 WEEKS GESTATION  Spinal  MALE @ 0128  APGAR 7/9  WT 1830  TAKEN TO NICU       Pertinent Labs/Diagnostic Results:       Results from last 7 days   Lab Units 22  0859   WBC Thousand/uL 26 13*   HEMOGLOBIN g/dL 9 1*   HEMATOCRIT % 26 1*   PLATELETS Thousands/uL 235   NEUTROS ABS Thousands/µL 22 22*       Medications:   Scheduled Medications:  acetaminophen, 650 mg, Oral, Q6H  docusate sodium, 100 mg, Oral, BID  famotidine, 20 mg, Oral, BID  ferrous sulfate, 325 mg, Oral, Daily With Breakfast  ibuprofen, 600 mg, Oral, Q6H [address]      Continuous IV Infusions:     PRN Meds:  albuterol, 2 5 mg, Nebulization, Once PRN  benzocaine-menthol-lanolin-aloe, 1 application, Topical, H6L PRN  calcium carbonate, 1,000 mg, Oral, BID PRN  calcium carbonate, 1,000 mg, Oral, Daily PRN  diphenhydrAMINE, 25 mg, Intravenous, Q6H PRN  diphenhydrAMINE, 25 mg, Oral, Q6H PRN  fentaNYL, 50 mcg, Intravenous, Q3 min PRN  hydrocortisone, 1 application, Topical, Daily PRN  HYDROmorphone, 0 5 mg, Intravenous, Q5 Min PRN  HYDROmorphone, 0 5 mg, Intravenous, Q1H PRN  HYDROmorphone, 1 mg, Intravenous, Q2H PRN  hydrOXYzine HCL, 25 mg, Oral, Q6H PRN  melatonin, 3 mg, Oral, HS PRN  metoclopramide, 10 mg, Intravenous, Q6H PRN  nalbuphine, 10 mg, Intravenous, Q1H PRN  ondansetron, 4 mg, Intravenous, Once PRN  ondansetron, 4 mg, Intravenous, Q8H PRN  oxyCODONE, 10 mg, Oral, Q4H PRN  oxyCODONE, 5 mg, Oral, Q4H PRN  polyethylene glycol, 17 g, Oral, Daily PRN  simethicone, 80 mg, Oral, Q6H PRN  simethicone, 80 mg, Oral, 4x Daily PRN  witch hazel-glycerin, 1 pad, Topical, Q4H PRN        Discharge Plan: home when medical cleared     Network Utilization Review Department  ATTENTION: Please call with any questions or concerns to [phone] and carefully listen to the prompts so that you are directed to the right person  All voicemails are confidential   Avila Horner all requests for admission clinical reviews, approved or denied determinations and any other requests to dedicated fax number below belonging to the campus where the patient is receiving treatment   List of dedicated fax numbers for the Facilities:  [address] DENIALS (Administrative/Medical Necessity) [phone]   [address] (Maternity/NICU/Pediatrics) [phone]   [address]  [address] [facility] [place] [address] [id number] [address] Stephanie Carbajal 14[address] [facility] [place] [address] [phone]

## 2022-08-04 ENCOUNTER — OFFICE VISIT (OUTPATIENT)
Dept: OBGYN CLINIC | Facility: MEDICAL CENTER | Age: 27
End: 2022-08-04

## 2022-08-04 VITALS
DIASTOLIC BLOOD PRESSURE: 74 MMHG | BODY MASS INDEX: 23.98 KG/M2 | HEIGHT: 72 IN | SYSTOLIC BLOOD PRESSURE: 120 MMHG | WEIGHT: 177 LBS

## 2022-08-04 DIAGNOSIS — Q83.1 ACCESSORY BREAST TISSUE OF AXILLA: ICD-10-CM

## 2022-08-04 PROCEDURE — 99024 POSTOP FOLLOW-UP VISIT: CPT | Performed by: STUDENT IN AN ORGANIZED HEALTH CARE EDUCATION/TRAINING PROGRAM

## 2022-08-04 NOTE — PROGRESS NOTES
[facility] [address] [place] [address], [place], [address]    Assessment/Plan:  Maryan Olivares is a 23 y o  [id number] who presents for postoperative check 1 week after primary classical  delivery at 29 weeks for  labor/PPROM and transverse lie  She is doing well  Postpartum care and examination  - Normal postpartum exam  - Depression Screen: 12 up from 11 last week; coping well but we discussed anticipatory guidance  She declines mediation or referral this time  She opts to follow up at 6 weeks but will call if having concerns prior   - Feeding: Pumping breastmilk for Pritesh in the NICU  - Patient Education: Postpartum resources including Pelvic Health PT and Baby & Me      Accessory breast tissue of axilla  - Had engorgement of the right accessory breast tissue that has since resolved    Diagnoses and all orders for this visit:    Postpartum care and examination    Accessory breast tissue of axilla          Subjective:   Maryan Olivares is a 23 y o   female  CC: postpartum    HPI: Tejal Couch presents for postop check 1 week after classical  delivery for 34 weeker with  labor and pprom  Pritesh is in the NICU but doing well  He was 98%th percentile for birth weight and never required intubation  He is doing well on CPAP  Tejal Couch is coping well  She is pumping breastmilk and has had swelling of accessory breast tissue in the right axilla  She denies fevers or chills  She is otherwise doing well  She feels emotional and tearful at times but is coping well  She has minimal bleeding and no incisional concerns  ROS: Review of Systems   Constitutional: Negative for chills and fever  Respiratory: Negative for cough and shortness of breath  Cardiovascular: Negative for chest pain and leg swelling  Gastrointestinal: Negative for abdominal pain, nausea and vomiting  Genitourinary: Negative for dysuria, frequency and urgency     Neurological: Negative for dizziness, light-headedness and headaches  PFSH: The following portions of the patient's history were reviewed and updated as appropriate: allergies, current medications, past family history, past medical history, obstetric history, gynecologic history, past social history, past surgical history and problem list        Objective:  /74   Ht 6' (1 829 m)   Wt 80 3 kg (177 lb)   LMP 12/25/2021   BMI 24 01 kg/m²    Physical Exam  Constitutional:       Appearance: Normal appearance  HENT:      Head: Normocephalic and atraumatic  Mouth/Throat:      Mouth: Mucous membranes are moist       Pharynx: Oropharynx is clear  No oropharyngeal exudate  Cardiovascular:      Rate and Rhythm: Normal rate  Pulmonary:      Effort: Pulmonary effort is normal    Chest:      Comments: Right accessory breast tissue of the axilla  Abdominal:      General: Abdomen is flat  There is no distension  Palpations: Abdomen is soft  There is no mass  Tenderness: There is no abdominal tenderness  Hernia: No hernia is present  Comments: Incision is clean, dry, intact, and well healing  There is no erythema, induration, fluctuance, or drainage  Skin:     General: Skin is warm and dry  Neurological:      General: No focal deficit present  Mental Status: She is alert and oriented to person, place, and time     Psychiatric:         Mood and Affect: Mood normal          Behavior: Behavior normal

## 2022-08-04 NOTE — ASSESSMENT & PLAN NOTE
- Normal postpartum exam  - Depression Screen: 12 up from 11 last week; coping well but we discussed anticipatory guidance  She declines mediation or referral this time  She opts to follow up at 6 weeks but will call if having concerns prior   - Feeding: Pumping breastmilk for Pritesh in the NICU    - Patient Education: Postpartum resources including Pelvic Health PT and Baby & Me

## 2022-08-09 NOTE — UTILIZATION REVIEW
Notification of Discharge   This is a Notification of Discharge from our facility [address]  Please be advised that this patient has been discharge from our facility  Below you will find the admission and discharge date and time including the patients disposition  UTILIZATION REVIEW CONTACT:  Iona Robins  Utilization   Network Utilization Review Department  Phone: [phone] x carefully listen to the prompts  All voicemails are confidential   Email: [email or web address]     PHYSICIAN ADVISORY SERVICES:  FOR [id number] REVIEW - MEDICAL NECESSITY DENIAL  Phone: [phone]  Fax: [phone]  Email: [email or web address]     PRESENTATION DATE: 7/18/2022  4:42 PM  OBERVATION ADMISSION DATE:   INPATIENT ADMISSION DATE: 7/18/22  4:42 PM   DISCHARGE DATE: 8/1/2022  4:45 PM  DISPOSITION: Home/Self Care Home/Self Care      IMPORTANT INFORMATION:  Send all requests for admission clinical reviews, approved or denied determinations and any other requests to dedicated fax number below belonging to the campus where the patient is receiving treatment   List of dedicated fax numbers:  [address] DENIALS (Administrative/Medical Necessity) [phone]   [address] (Maternity/NICU/Pediatrics) [phone]   [facility] [phone]   130 [facility] [phone]   [address] [place] [address] [phone]   2000 [facility] [address] [place] [address],[address] [phone]   [facility]  [phone]   2202 [facility] [address] [facility]  2401 [facility] 1000 [address] [facility] [phone]

## 2022-09-06 ENCOUNTER — POSTPARTUM VISIT (OUTPATIENT)
Dept: OBGYN CLINIC | Facility: MEDICAL CENTER | Age: 27
End: 2022-09-06

## 2022-09-06 VITALS
BODY MASS INDEX: 24.14 KG/M2 | DIASTOLIC BLOOD PRESSURE: 64 MMHG | WEIGHT: 178.2 LBS | SYSTOLIC BLOOD PRESSURE: 118 MMHG | HEIGHT: 72 IN

## 2022-09-06 PROCEDURE — 99024 POSTOP FOLLOW-UP VISIT: CPT | Performed by: OBSTETRICS & GYNECOLOGY

## 2022-09-06 NOTE — PROGRESS NOTES
Daniel Tang is a 33 y o  female who presents for a postpartum visit  She is 6 weeks postpartum following a classical  section   I have fully reviewed the prenatal and intrapartum course  The delivery was at 29 weeks gestational weeks  Outcome: primary  section, classical incision  Baby's course has been  Normal for 29 weeks  Baby still in NICU working on feedings prior to discharge    Baby is feeding by both breast and bottle  Bleeding no bleeding  Bowel function is normal  Bladder function is normal  Patient is not sexually active  Contraception method is none  Postpartum depression screening: negative  EDPS 6 - states has some days she feels overwhelmed but overall doing well     The following portions of the patient's history were reviewed and updated as appropriate: allergies, current medications, past family history, past medical history, past social history, past surgical history and problem list     Review of Systems  Pertinent items are noted in HPI       Objective     /64   Ht 6' (1 829 m)   Wt 80 8 kg (178 lb 3 2 oz)   LMP 2021   BMI 24 17 kg/m²    General:  alert and oriented, in no acute distress    Breasts:  deferred    Lungs: unlabored breathing    Abdomen: soft depressible non tender       Assessment/Plan     Normal  postpartum exam  Pap smear not done at today's visit  1  Contraception: none  2  anxierty and depression discussed  - states overall doing well but would make follow up if these develop after baby home    3  Follow up in: 4 months for yearly

## 2022-12-02 ENCOUNTER — TELEPHONE (OUTPATIENT)
Dept: FAMILY MEDICINE CLINIC | Facility: CLINIC | Age: 27
End: 2022-12-02

## 2022-12-02 NOTE — TELEPHONE ENCOUNTER
Patient called for a chiropractor referral, however, hasn't been seen in a while  Pt is wondering if you could send referral or if she should be seen in office first  Please advise

## 2022-12-06 ENCOUNTER — OFFICE VISIT (OUTPATIENT)
Dept: FAMILY MEDICINE CLINIC | Facility: CLINIC | Age: 27
End: 2022-12-06

## 2022-12-06 VITALS
DIASTOLIC BLOOD PRESSURE: 86 MMHG | TEMPERATURE: 98 F | SYSTOLIC BLOOD PRESSURE: 122 MMHG | HEART RATE: 126 BPM | BODY MASS INDEX: 25.19 KG/M2 | WEIGHT: 186 LBS | HEIGHT: 72 IN | OXYGEN SATURATION: 99 %

## 2022-12-06 DIAGNOSIS — Z13.6 SCREENING FOR CARDIOVASCULAR CONDITION: ICD-10-CM

## 2022-12-06 DIAGNOSIS — Z13.0 SCREENING FOR DEFICIENCY ANEMIA: ICD-10-CM

## 2022-12-06 DIAGNOSIS — R77.8 ABNORMAL SERUM PROTEIN TEST: ICD-10-CM

## 2022-12-06 DIAGNOSIS — G89.29 CHRONIC BILATERAL LOW BACK PAIN WITHOUT SCIATICA: ICD-10-CM

## 2022-12-06 DIAGNOSIS — Z11.59 NEED FOR HEPATITIS C SCREENING TEST: ICD-10-CM

## 2022-12-06 DIAGNOSIS — M54.50 CHRONIC BILATERAL LOW BACK PAIN WITHOUT SCIATICA: ICD-10-CM

## 2022-12-06 DIAGNOSIS — Z00.00 ROUTINE ADULT HEALTH MAINTENANCE: Primary | ICD-10-CM

## 2022-12-06 NOTE — ASSESSMENT & PLAN NOTE
Advised on diet and exercise; reviewed recommended vaccines but declined today; advised on screening labs and repeat labs due to abnormalities during pregnancy; UTD on PAP; advised on dental visits

## 2022-12-06 NOTE — ASSESSMENT & PLAN NOTE
Discussed tx options such as physical therapy and chiropractor; pt prefers trial with chiropractor first; no concerning findings on exam; f/u guidance given should sx not improve or worsen

## 2022-12-06 NOTE — PROGRESS NOTES
ADULT ANNUAL [address] [facility]    NAME: Marylu Barrientos  AGE: 33 y o  SEX: female  : 1995     DATE: 2022     Assessment and Plan:     Problem List Items Addressed This Visit        Other    Routine adult health maintenance - Primary     Advised on diet and exercise; reviewed recommended vaccines but declined today; advised on screening labs and repeat labs due to abnormalities during pregnancy; UTD on PAP; advised on dental visits         Chronic bilateral low back pain without sciatica     Discussed tx options such as physical therapy and chiropractor; pt prefers trial with chiropractor first; no concerning findings on exam; f/u guidance given should sx not improve or worsen         Relevant Orders    Ambulatory Referral to Chiropractic   Other Visit Diagnoses     Need for hepatitis C screening test        Relevant Orders    Hepatitis C Ab W/Refl To HCV RNA, Qn, PCR    Abnormal serum protein test        Relevant Orders    Protein S activity    Screening for cardiovascular condition        Relevant Orders    Lipid Panel with Direct LDL reflex    Comprehensive metabolic panel    Screening for deficiency anemia        Relevant Orders    CBC and differential          Immunizations and preventive care screenings were discussed with patient today  Appropriate education was printed on patient's after visit summary  Counseling:  Dental Health: discussed importance of regular tooth brushing, flossing, and dental visits  · Exercise: the importance of regular exercise/physical activity was discussed  Recommend exercise 3-5 times per week for at least 30 minutes            Return for Annual physical      Chief Complaint:     Chief Complaint   Patient presents with   • Physical Exam     Back pain- needs referral for chiropractor      History of Present Illness:     Adult Annual Physical   Patient here for a comprehensive physical exam  The patient reports   Patient is complaining of pain in back in lumbar region  Started during pregnancy  Still this is dull and feels more spread out now  No radiation down the legs  Was sharp during pregnancy and now more dull and widespread across back  Has not really tried anything for it  No bowel or bladder issues  Interested in seeing a chiropractor  Diet and Physical Activity  · Diet/Nutrition: well balanced diet  Had larger appetite with breastfeeding but recently stopped  · Exercise: no formal exercise  Depression Screening  PHQ-2/9 Depression Screening    Little interest or pleasure in doing things: 0 - not at all  Feeling down, depressed, or hopeless: 0 - not at all  PHQ-2 Score: 0  PHQ-2 Interpretation: Negative depression screen       General Health  · Sleep: sleeps well  · Hearing: normal - bilateral   · Vision: goes for regular eye exams and wears glasses  · Dental: regular dental visits  /GYN Health  · Patient is: premenopausal  · Contraceptive method: condoms  Review of Systems:     Review of Systems   Constitutional: Negative for chills, fatigue and fever  Gastrointestinal: Negative for abdominal pain  Musculoskeletal: Positive for back pain  Neurological: Negative for weakness and numbness  Past Medical History:     Past Medical History:   Diagnosis Date   • Common wart     Resolved 2016    • Ganglion cyst     Resolved 2016    • Migraine with aura     Resolved 2016    • Varicella     vaccine      Past Surgical History:     Past Surgical History:   Procedure Laterality Date   • DENTAL SURGERY     • ND  DELIVERY ONLY N/A 2022    Procedure:  SECTION ();   Surgeon: Jeanna Pritchett MD;  Location: [facility] ;  Service: Obstetrics      Social History:     Social History     Socioeconomic History   • Marital status: /Civil Union     Spouse name: None   • Number of children: None   • Years of education: None   • Highest education level: None   Occupational History   • None   Tobacco Use   • Smoking status: Never   • Smokeless tobacco: Never   Vaping Use   • Vaping Use: Never used   Substance and Sexual Activity   • Alcohol use: Not Currently   • Drug use: No   • Sexual activity: Not Currently     Partners: Male     Birth control/protection: OCP, None   Other Topics Concern   • None   Social History Narrative   • None     Social Determinants of Health     Financial Resource Strain: Not on file   Food Insecurity: Not on file   Transportation Needs: Not on file   Physical Activity: Not on file   Stress: Not on file   Social Connections: Not on file   Intimate Partner Violence: Not on file   Housing Stability: Not on file      Family History:     Family History   Problem Relation Age of Onset   • Arthritis Mother    • Deep vein thrombosis Father    • Colon cancer Maternal Grandmother    • No Known Problems Sister    • Prostate cancer Maternal Grandfather    • Skin cancer Maternal Grandfather    • Hypertension Maternal Grandfather    • Cancer Maternal Grandfather         bladder cancer    • Ovarian cancer Paternal Grandmother    • Breast cancer Maternal Aunt         unsure age       Current Medications:     No current outpatient medications on file  No current facility-administered medications for this visit  Allergies: Allergies   Allergen Reactions   • Clindamycin Throat Swelling   • Sumatriptan Nausea Only and Throat Swelling   • Azithromycin GI Intolerance and Vomiting   • Penicillins Rash      Physical Exam:     /86 (BP Location: Right arm, Patient Position: Sitting, Cuff Size: Standard)   Pulse (!) 126   Temp 98 °F (36 7 °C) (Temporal)   Ht 6' (1 829 m)   Wt 84 4 kg (186 lb)   SpO2 99%   BMI 25 23 kg/m²     Physical Exam  Vitals reviewed  Constitutional:       General: She is not in acute distress  Appearance: Normal appearance  She is normal weight  She is not ill-appearing or toxic-appearing     HENT: Head: Normocephalic and atraumatic  Right Ear: Tympanic membrane, ear canal and external ear normal  There is no impacted cerumen  Left Ear: Tympanic membrane, ear canal and external ear normal  There is no impacted cerumen  Nose: Nose normal       Mouth/Throat:      Mouth: Mucous membranes are moist       Pharynx: No oropharyngeal exudate or posterior oropharyngeal erythema  Eyes:      General: No scleral icterus  Left eye: No discharge  Conjunctiva/sclera: Conjunctivae normal       Pupils: Pupils are equal, round, and reactive to light  Neck:      Thyroid: No thyroid mass, thyromegaly or thyroid tenderness  Cardiovascular:      Rate and Rhythm: Normal rate and regular rhythm  Heart sounds: Normal heart sounds  No murmur heard  Pulmonary:      Effort: Pulmonary effort is normal  No respiratory distress  Breath sounds: Normal breath sounds  No wheezing, rhonchi or rales  Abdominal:      General: Abdomen is flat  Palpations: There is no mass  Tenderness: There is no abdominal tenderness  There is no guarding  Hernia: No hernia is present  Musculoskeletal:         General: No swelling  Cervical back: Neck supple  No muscular tenderness  Lumbar back: Spasms present  No bony tenderness  Normal range of motion  Negative right straight leg raise test and negative left straight leg raise test         Back:       Comments: 5/5 strength of LE B/L   Lymphadenopathy:      Cervical: No cervical adenopathy  Skin:     Findings: No rash  Neurological:      Mental Status: She is alert and oriented to person, place, and time  Psychiatric:         Mood and Affect: Mood normal          Behavior: Behavior normal          Thought Content:  Thought content normal          Judgment: Judgment normal           Whitney Ibarra DO  1002 [facility]

## 2022-12-07 NOTE — TELEPHONE ENCOUNTER
Pt called referral line asking for insurance referral appt tmw 12/8/22 done and pt notified faxed to [facility] family Chiropractor [phone]

## 2022-12-09 ENCOUNTER — APPOINTMENT (OUTPATIENT)
Dept: RADIOLOGY | Facility: CLINIC | Age: 27
End: 2022-12-09

## 2022-12-09 DIAGNOSIS — M54.2 CERVICALGIA: ICD-10-CM

## 2022-12-09 DIAGNOSIS — M54.51 VERTEBROGENIC LOW BACK PAIN: ICD-10-CM

## 2022-12-14 NOTE — PROGRESS NOTES
[facility] [address] [place] [address]    Assessment/Plan:  No problem-specific Assessment & Plan notes found for this encounter  Diagnoses and all orders for this visit:    Vaginal pain    - discussed lubricants, moisturizers, and position changes  - postpartum changes  - Option of physical therapy if no improvement  - may benefit from perineal massage   - to keep next scheduled appointment and call if no improvement, can send referral for PT      Subjective:   Ubaldo Rodas is a 33 y o   female  CC: vaginal discomfort    HPI: Kim Bustillos weaned her son 2022 and still has ocasional leaking of breast milk  First period since delivery was 12/10/22 and lasted until Dec 13th  Flow was very heavy and she noted discomfort with tampon insertion and had pain with removal  Tampon was fully saturated  Using condoms and has only had intercourse a few times since birth of child  No problems with voiding, notes some frequency  No problems with bowel pattern of discomfort  Notes that vaginal tissue feels swollen and notes more ridges  ROS: Review of Systems   Constitutional: Negative for chills and fever  Gastrointestinal: Negative for blood in stool and constipation  Genitourinary: Positive for dyspareunia and frequency  Negative for difficulty urinating, pelvic pain, urgency, vaginal bleeding, vaginal discharge and vaginal pain  PFSH: The following portions of the patient's history were reviewed and updated as appropriate: allergies, current medications, past family history, past medical history, obstetric history, gynecologic history, past social history, past surgical history and problem list        Objective:  /78   Ht 6' (1 829 m)   Wt 82 kg (180 lb 11 2 oz)   LMP 12/10/2022   BMI 24 51 kg/m²    Physical Exam  Constitutional:       Appearance: Normal appearance     Pulmonary:      Effort: Pulmonary effort is normal    Genitourinary:     General: Normal vulva  Exam position: Lithotomy position  Labia:         Right: No rash, tenderness or lesion  Left: No rash, tenderness or lesion  Vagina: No vaginal discharge, erythema, tenderness, bleeding or lesions  Cervix: No cervical motion tenderness, discharge, friability or lesion  Uterus: Not enlarged and not tender  Adnexa:         Right: No mass, tenderness or fullness  Left: No mass, tenderness or fullness  Comments: - reviewed normal tissue appearance and changes  - no abnormal discharge noted  - angle of penetration may be part of discomfort  Neurological:      Mental Status: She is alert and oriented to person, place, and time     Psychiatric:         Mood and Affect: Mood normal          Behavior: Behavior normal

## 2022-12-15 ENCOUNTER — OFFICE VISIT (OUTPATIENT)
Dept: OBGYN CLINIC | Facility: MEDICAL CENTER | Age: 27
End: 2022-12-15

## 2022-12-15 VITALS
WEIGHT: 180.7 LBS | BODY MASS INDEX: 24.47 KG/M2 | HEIGHT: 72 IN | SYSTOLIC BLOOD PRESSURE: 116 MMHG | DIASTOLIC BLOOD PRESSURE: 78 MMHG

## 2022-12-15 DIAGNOSIS — R10.2 VAGINAL PAIN: Primary | ICD-10-CM

## 2022-12-15 LAB
ALBUMIN SERPL-MCNC: 3.8 G/DL (ref 3.6–5.1)
ALBUMIN/GLOB SERPL: 1.7 (CALC) (ref 1–2.5)
ALP SERPL-CCNC: 83 U/L (ref 31–125)
ALT SERPL-CCNC: 17 U/L (ref 6–29)
AST SERPL-CCNC: 15 U/L (ref 10–30)
BASOPHILS # BLD AUTO: 28 CELLS/UL (ref 0–200)
BASOPHILS NFR BLD AUTO: 0.4 %
BILIRUB SERPL-MCNC: 0.8 MG/DL (ref 0.2–1.2)
BUN SERPL-MCNC: 10 MG/DL (ref 7–25)
BUN/CREAT SERPL: NORMAL (CALC) (ref 6–22)
CALCIUM SERPL-MCNC: 9 MG/DL (ref 8.6–10.2)
CHLORIDE SERPL-SCNC: 108 MMOL/L (ref 98–110)
CHOLEST SERPL-MCNC: 134 MG/DL
CHOLEST/HDLC SERPL: 2.6 (CALC)
CO2 SERPL-SCNC: 27 MMOL/L (ref 20–32)
CREAT SERPL-MCNC: 0.59 MG/DL (ref 0.5–0.96)
EOSINOPHIL # BLD AUTO: 152 CELLS/UL (ref 15–500)
EOSINOPHIL NFR BLD AUTO: 2.2 %
ERYTHROCYTE [DISTWIDTH] IN BLOOD BY AUTOMATED COUNT: 12.9 % (ref 11–15)
GFR/BSA.PRED SERPLBLD CYS-BASED-ARV: 127 ML/MIN/1.73M2
GLOBULIN SER CALC-MCNC: 2.3 G/DL (CALC) (ref 1.9–3.7)
GLUCOSE SERPL-MCNC: 93 MG/DL (ref 65–99)
HCT VFR BLD AUTO: 35 % (ref 35–45)
HCV AB S/CO SERPL IA: 0.03
HCV AB SERPL QL IA: NORMAL
HDLC SERPL-MCNC: 52 MG/DL
HGB BLD-MCNC: 11.9 G/DL (ref 11.7–15.5)
LDLC SERPL CALC-MCNC: 63 MG/DL (CALC)
LYMPHOCYTES # BLD AUTO: 2829 CELLS/UL (ref 850–3900)
LYMPHOCYTES NFR BLD AUTO: 41 %
MCH RBC QN AUTO: 28.7 PG (ref 27–33)
MCHC RBC AUTO-ENTMCNC: 34 G/DL (ref 32–36)
MCV RBC AUTO: 84.5 FL (ref 80–100)
MONOCYTES # BLD AUTO: 573 CELLS/UL (ref 200–950)
MONOCYTES NFR BLD AUTO: 8.3 %
NEUTROPHILS # BLD AUTO: 3319 CELLS/UL (ref 1500–7800)
NEUTROPHILS NFR BLD AUTO: 48.1 %
NONHDLC SERPL-MCNC: 82 MG/DL (CALC)
PLATELET # BLD AUTO: 250 THOUSAND/UL (ref 140–400)
PMV BLD REES-ECKER: 11 FL (ref 7.5–12.5)
POTASSIUM SERPL-SCNC: 4 MMOL/L (ref 3.5–5.3)
PROT S ACT/NOR PPP: 77 % NORMAL (ref 60–140)
PROT SERPL-MCNC: 6.1 G/DL (ref 6.1–8.1)
RBC # BLD AUTO: 4.14 MILLION/UL (ref 3.8–5.1)
SODIUM SERPL-SCNC: 141 MMOL/L (ref 135–146)
TRIGL SERPL-MCNC: 104 MG/DL
WBC # BLD AUTO: 6.9 THOUSAND/UL (ref 3.8–10.8)

## 2022-12-21 PROBLEM — O42.119 PRETERM PREMATURE RUPTURE OF MEMBRANES WITH ONSET OF LABOR MORE THAN 24 HOURS FOLLOWING RUPTURE: Status: RESOLVED | Noted: 2022-07-18 | Resolved: 2022-12-21

## 2023-02-04 PROBLEM — Z00.00 ROUTINE ADULT HEALTH MAINTENANCE: Status: RESOLVED | Noted: 2021-10-04 | Resolved: 2023-02-04

## 2023-05-03 ENCOUNTER — ANNUAL EXAM (OUTPATIENT)
Dept: OBGYN CLINIC | Facility: MEDICAL CENTER | Age: 28
End: 2023-05-03

## 2023-05-03 VITALS
HEIGHT: 72 IN | BODY MASS INDEX: 23.98 KG/M2 | WEIGHT: 177 LBS | SYSTOLIC BLOOD PRESSURE: 122 MMHG | DIASTOLIC BLOOD PRESSURE: 80 MMHG

## 2023-05-03 DIAGNOSIS — Z78.9 USES BIRTH CONTROL: ICD-10-CM

## 2023-05-03 DIAGNOSIS — Z01.419 ENCOUNTER FOR WELL WOMAN EXAM WITH ROUTINE GYNECOLOGICAL EXAM: Primary | ICD-10-CM

## 2023-05-03 RX ORDER — NORGESTIMATE AND ETHINYL ESTRADIOL 0.25-0.035
1 KIT ORAL DAILY
Qty: 84 TABLET | Refills: 2 | Status: SHIPPED | OUTPATIENT
Start: 2023-05-03 | End: 2023-07-26

## 2023-05-03 NOTE — PROGRESS NOTES
ASSESSMENT & PLAN: Fahad Solitario is a 33 y o   with normal gynecologic exam     1   Routine well woman exam done today  2  Pap:  The patient's last pap was   It was normal     Pap was done today  Current ASCCP Guidelines reviewed  3   STD testing  was not done   4  Birth control options reviewed  -OCP sent , risks and benefits reviewed    5  The following were reviewed in today's visit: breast self exam, use and side effects of OCPs, family planning choices, exercise and healthy diet      CC:  Annual Gynecologic Examination    HPI: Fahad Solitario is a 33 y o  Bebe Shannon who presents for annual gynecologic examination  She has the following concerns:  Painful ovulation and very heavy cycles      Health Maintenance:    She wears her seatbelt routinely  She does perform regular monthly self breast exams  She feels safe at home  Past Medical History:   Diagnosis Date    Common wart     Resolved 2016     Ganglion cyst     Resolved 2016     Migraine with aura     Resolved 2016     Varicella     vaccine       Past Surgical History:   Procedure Laterality Date    DENTAL SURGERY      CT  DELIVERY ONLY N/A 2022    Procedure:  SECTION ();   Surgeon: Carmen Gan MD;  Location: [facility] ;  Service: Obstetrics       OB/Gyn History:        OB History        1    Para   1    Term   0       1    AB   0    Living   1       SAB   0    IAB   0    Ectopic   0    Multiple   0    Live Births   1                 Family History   Problem Relation Age of Onset    Arthritis Mother     Deep vein thrombosis Father     Colon cancer Maternal Grandmother     No Known Problems Sister     Prostate cancer Maternal Grandfather     Skin cancer Maternal Grandfather     Hypertension Maternal Grandfather     Cancer Maternal Grandfather         bladder cancer     Ovarian cancer Paternal Grandmother     Breast cancer Maternal Aunt         unsure age        Social History:  Social History     Socioeconomic History    Marital status: /Civil Union     Spouse name: Not on file    Number of children: Not on file    Years of education: Not on file    Highest education level: Not on file   Occupational History    Not on file   Tobacco Use    Smoking status: Never    Smokeless tobacco: Never   Vaping Use    Vaping Use: Never used   Substance and Sexual Activity    Alcohol use: Yes     Comment: social    Drug use: No    Sexual activity: Yes     Partners: Male     Birth control/protection: None   Other Topics Concern    Not on file   Social History Narrative    Not on file     Social Determinants of Health     Financial Resource Strain: Not on file   Food Insecurity: Not on file   Transportation Needs: Not on file   Physical Activity: Not on file   Stress: Not on file   Social Connections: Not on file   Intimate Partner Violence: Not on file   Housing Stability: Not on file         Allergies   Allergen Reactions    Clindamycin Throat Swelling    Sumatriptan Nausea Only and Throat Swelling    Azithromycin GI Intolerance and Vomiting    Penicillins Rash         Current Outpatient Medications:     norgestimate-ethinyl estradiol (Ortho-Cyclen, 28,) 0 25-35 MG-MCG per tablet, Take 1 tablet by mouth daily, Disp: 84 tablet, Rfl: 2    Review of Systems:  Constitutional :no fever, feels well, no tiredness, no recent weight gain or loss  ENT: no ear ache, no loss of hearing, no nosebleeds or nasal discharge, no sore throat or hoarseness  Cardiovascular: no complaints of slow or fast heart beat, no chest pain, no palpitations, no leg claudication or lower extremity edema    Respiratory: no complaints of shortness of shortness of breath, no LUNA  Breasts:no complaints of breast pain, breast lump, or nipple discharge  Gastrointestinal: no complaints of abdominal pain, constipation, nausea, vomiting, or diarrhea or bloody stools  Genitourinary : no complaints of dysuria, incontinence, pelvic pain, no dysmenorrhea, vaginal discharge or abnormal vaginal bleeding and as noted in HPI  Musculoskeletal: no complaints of arthralgia, no myalgia, no joint swelling or stiffness, no limb pain or swelling  Integumentary: no complaints of skin rash or lesion, itching or dry skin  Neurological: no complaints of headache, no confusion, no numbness or tingling, no dizziness or fainting    Objective      /80   Ht 6' (1 829 m)   Wt 80 3 kg (177 lb)   LMP  (LMP Unknown)   BMI 24 01 kg/m²     General:   appears stated age, cooperative, alert normal mood and affect   Lungs: Unlabored breathing     Breasts: normal appearance, no masses or tenderness   Abdomen: soft, non-tender, without masses or organomegaly   Vulva: normal   Vagina: normal vagina, no discharge, exudate, lesion, or erythema   Urethra: normal   Cervix: Normal, no discharge  Nontender     Uterus: normal size, contour, position, consistency, mobility, non-tender   Adnexa: no mass, fullness, tenderness   Psychiatric orientation to person, place, and time: normal  mood and affect: normal

## 2023-05-09 LAB
LAB AP GYN PRIMARY INTERPRETATION: NORMAL
Lab: NORMAL

## 2023-07-14 ENCOUNTER — TELEPHONE (OUTPATIENT)
Dept: FAMILY MEDICINE CLINIC | Facility: CLINIC | Age: 28
End: 2023-07-14

## 2023-07-14 DIAGNOSIS — D22.9 ATYPICAL NEVI: Primary | ICD-10-CM

## 2023-07-14 NOTE — TELEPHONE ENCOUNTER
Patient requesting referral to dermatology for Dr. Nehemiah Bryson at [facility] for Diagnosis:  Atypical nevi (D22.9 [ICD-10-CM]) . She has a previous referral in system but has . . Patient states she received one before.

## 2024-01-24 ENCOUNTER — TELEPHONE (OUTPATIENT)
Age: 29
End: 2024-01-24

## 2024-01-24 NOTE — TELEPHONE ENCOUNTER
Pt called to see how long her referral to dermatology is good for.  Can't get in with [facility]'s dermatology.  She has been waiting for 6 months and would like to try elsewhere.    Checked referral and it was written 7/2023

## 2024-01-31 ENCOUNTER — TELEPHONE (OUTPATIENT)
Age: 29
End: 2024-01-31

## 2024-01-31 NOTE — TELEPHONE ENCOUNTER
Patient is requesting an insurance referral for the following specialty:      Test Name / Order Name: Dermatology mole check    DX Code:     Date Of Service: 2/2/24 at 2:45 pm    Location/Facility Name/Address/Phone #: [facility] [address], [place]    Location / Facility NPI: unknown    Best Phone # To Reach The Patient:  [phone]

## 2024-03-28 ENCOUNTER — OFFICE VISIT (OUTPATIENT)
Dept: FAMILY MEDICINE CLINIC | Facility: CLINIC | Age: 29
End: 2024-03-28
Payer: COMMERCIAL

## 2024-03-28 VITALS
SYSTOLIC BLOOD PRESSURE: 116 MMHG | BODY MASS INDEX: 24.65 KG/M2 | DIASTOLIC BLOOD PRESSURE: 78 MMHG | TEMPERATURE: 97.8 F | HEART RATE: 95 BPM | OXYGEN SATURATION: 99 % | WEIGHT: 182 LBS | HEIGHT: 72 IN

## 2024-03-28 DIAGNOSIS — Z00.00 ANNUAL PHYSICAL EXAM: Primary | ICD-10-CM

## 2024-03-28 PROCEDURE — 99395 PREV VISIT EST AGE 18-39: CPT | Performed by: FAMILY MEDICINE

## 2024-03-28 NOTE — PATIENT INSTRUCTIONS
Try FODFMAP diet. Give it a few weeks and see how you do. And if no improvement, let me know and I will order some testing.  Wellness Visit for Adults   AMBULATORY CARE:   A wellness visit  is when you see your healthcare provider to get screened for health problems. Your healthcare provider will also give you advice on how to stay healthy. Write down your questions so you remember to ask them. Ask your healthcare provider how often you should have a wellness visit.  What happens at a wellness visit:  Your healthcare provider will ask about your health, and your family history of health problems. This includes high blood pressure, heart disease, and cancer. He or she will ask if you have symptoms that concern you, if you smoke, and about your mood. You may also be asked about your intake of medicines, supplements, food, and alcohol. Any of the following may be done:  Your weight  will be checked. Your height may also be checked so your body mass index (BMI) can be calculated. Your BMI shows if you are at a healthy weight.    Your blood pressure  and heart rate will be checked. Your temperature may also be checked.    Blood and urine tests  may be done. Blood tests may be done to check your cholesterol levels. Abnormal cholesterol levels increase your risk for heart disease and stroke. You may also need a blood or urine test to check for diabetes if you are at increased risk. Urine tests may be done to look for signs of an infection or kidney disease.    A physical exam  includes checking your heartbeat and lungs with a stethoscope. Your healthcare provider may also check your skin to look for sun damage.    Screening tests  may be recommended. A screening test is done to check for diseases that may not cause symptoms. The screening tests you may need depend on your age, gender, family history, and lifestyle habits. For example, colorectal screening may be recommended if you are 50 years old or older.    Screening  tests you need if you are a woman:   A Pap smear  is used to screen for cervical cancer. Pap smears are usually done every 3 to 5 years depending on your age. You may need them more often if you have had abnormal Pap smear test results in the past. Ask your healthcare provider how often you should have a Pap smear.    A mammogram  is an x-ray of your breasts to screen for breast cancer. Experts recommend mammograms every 2 years starting at age 50 years. You may need a mammogram at age 49 years or younger if you have an increased risk for breast cancer. Talk to your healthcare provider about when you should start having mammograms and how often you need them.    Vaccines you may need:   Get an influenza vaccine  every year. The influenza vaccine protects you from the flu. Several types of viruses cause the flu. The viruses change over time, so new vaccines are made each year.    Get a tetanus-diphtheria (Td) booster vaccine  every 10 years. This vaccine protects you against tetanus and diphtheria. Tetanus is a severe infection that may cause painful muscle spasms and lockjaw. Diphtheria is a severe bacterial infection that causes a thick covering in the back of your mouth and throat.    Get a human papillomavirus (HPV) vaccine  if you are female and aged 19 to 26 or male 19 to 21 and never received it. This vaccine protects you from HPV infection. HPV is the most common infection spread by sexual contact. HPV may also cause vaginal, penile, and anal cancers.    Get a pneumococcal vaccine  if you are aged 65 years or older. The pneumococcal vaccine is an injection given to protect you from pneumococcal disease. Pneumococcal disease is an infection caused by pneumococcal bacteria. The infection may cause pneumonia, meningitis, or an ear infection.    Get a shingles vaccine  if you are 60 or older, even if you have had shingles before. The shingles vaccine is an injection to protect you from the varicella-zoster virus.  This is the same virus that causes chickenpox. Shingles is a painful rash that develops in people who had chickenpox or have been exposed to the virus.    How to eat healthy:  My Plate is a model for planning healthy meals. It shows the types and amounts of foods that should go on your plate. Fruits and vegetables make up about half of your plate, and grains and protein make up the other half. A serving of dairy is included on the side of your plate. The amount of calories and serving sizes you need depends on your age, gender, weight, and height. Examples of healthy foods are listed below:  Eat a variety of vegetables  such as dark green, red, and orange vegetables. You can also include canned vegetables low in sodium (salt) and frozen vegetables without added butter or sauces.    Eat a variety of fresh fruits , canned fruit in 100% juice, frozen fruit, and dried fruit.    Include whole grains.  At least half of the grains you eat should be whole grains. Examples include whole-wheat bread, wheat pasta, brown rice, and whole-grain cereals such as oatmeal.    Eat a variety of protein foods such as seafood (fish and shellfish), lean meat, and poultry without skin (turkey and chicken). Examples of lean meats include pork leg, shoulder, or tenderloin, and beef round, sirloin, tenderloin, and extra lean ground beef. Other protein foods include eggs and egg substitutes, beans, peas, soy products, nuts, and seeds.    Choose low-fat dairy products such as skim or 1% milk or low-fat yogurt, cheese, and cottage cheese.    Limit unhealthy fats  such as butter, hard margarine, and shortening.       Exercise:  Exercise at least 30 minutes per day on most days of the week. Some examples of exercise include walking, biking, dancing, and swimming. You can also fit in more physical activity by taking the stairs instead of the elevator or parking farther away from stores. Include muscle strengthening activities 2 days each week.  Regular exercise provides many health benefits. It helps you manage your weight, and decreases your risk for type 2 diabetes, heart disease, stroke, and high blood pressure. Exercise can also help improve your mood. Ask your healthcare provider about the best exercise plan for you.       General health and safety guidelines:   Do not smoke.  Nicotine and other chemicals in cigarettes and cigars can cause lung damage. Ask your healthcare provider for information if you currently smoke and need help to quit. E-cigarettes or smokeless tobacco still contain nicotine. Talk to your healthcare provider before you use these products.    Limit alcohol.  A drink of alcohol is 12 ounces of beer, 5 ounces of wine, or 1½ ounces of liquor.    Lose weight, if needed.  Being overweight increases your risk of certain health conditions. These include heart disease, high blood pressure, type 2 diabetes, and certain types of cancer.    Protect your skin.  Do not sunbathe or use tanning beds. Use sunscreen with a SPF 15 or higher. Apply sunscreen at least 15 minutes before you go outside. Reapply sunscreen every 2 hours. Wear protective clothing, hats, and sunglasses when you are outside.    Drive safely.  Always wear your seatbelt. Make sure everyone in your car wears a seatbelt. A seatbelt can save your life if you are in an accident. Do not use your cell phone when you are driving. This could distract you and cause an accident. Pull over if you need to make a call or send a text message.    Practice safe sex.  Use latex condoms if are sexually active and have more than one partner. Your healthcare provider may recommend screening tests for sexually transmitted infections (STIs).    Wear helmets, lifejackets, and protective gear.  Always wear a helmet when you ride a bike or motorcycle, go skiing, or play sports that could cause a head injury. Wear protective equipment when you play sports. Wear a lifejacket when you are on a boat or  doing water sports.    © Copyright Merative 2023 Information is for End User's use only and may not be sold, redistributed or otherwise used for commercial purposes.  The above information is an  only. It is not intended as medical advice for individual conditions or treatments. Talk to your doctor, nurse or pharmacist before following any medical regimen to see if it is safe and effective for you.

## 2024-03-28 NOTE — PROGRESS NOTES
ADULT ANNUAL PHYSICAL  [facility]    NAME: Ailyn Muro  AGE: 28 y.o. SEX: female  : 1995     DATE: 3/28/2024     Assessment and Plan:     Problem List Items Addressed This Visit    None  Visit Diagnoses     Annual physical exam    -  Primary      Patient is a 28 year old female seen for physical exam. Complains of daily abdominal pain. Discussed FODMAP diet. Will see how if there is any change in abdominal pain. If not - will need further testing.      Immunizations and preventive care screenings were discussed with patient today. Appropriate education was printed on patient's after visit summary.    Counseling:  Alcohol/drug use: discussed moderation in alcohol intake, the recommendations for healthy alcohol use, and avoidance of illicit drug use.  Dental Health: discussed importance of regular tooth brushing, flossing, and dental visits.  Exercise: the importance of regular exercise/physical activity was discussed. Recommend exercise 3-5 times per week for at least 30 minutes.       Depression Screening and Follow-up Plan: Patient was screened for depression during today's encounter. They screened negative with a PHQ-2 score of 0.        Return in about 1 year (around 3/28/2025) for Annual physical.     Chief Complaint:     Chief Complaint   Patient presents with   • Physical Exam      History of Present Illness:     Adult Annual Physical   Patient here for a comprehensive physical exam. The patient reports  has a stomach ache almost every day - can't say that she notices any particular food bothers her. Gets pain, bloating .Feels like she can't burp, all released as flatus.  Has a BM almost every day, but sometimes feels constipated.  Has 20 month old son.  Sister has similar symptoms but has not sought treatment.  Diet and Physical Activity  Diet/Nutrition: well balanced diet.   Exercise: walking.      Depression Screening  PHQ-2/9 Depression  Screening    Little interest or pleasure in doing things: 0 - not at all  Feeling down, depressed, or hopeless: 0 - not at all  PHQ-2 Score: 0  PHQ-2 Interpretation: Negative depression screen       General Health  Sleep: sleeps well.   Hearing: normal - bilateral.  Vision: no vision problems.   Dental: regular dental visits.       /GYN Health  Follows with gynecology? yes   Last menstrual period: last week  Contraceptive method:  none right now .      Review of Systems:     Review of Systems   Constitutional:  Negative for chills and fever.   HENT:  Negative for congestion and sore throat.    Respiratory:  Negative for chest tightness.    Cardiovascular:  Negative for chest pain and palpitations.   Gastrointestinal:  Negative for abdominal pain, constipation, diarrhea and nausea.        As in HPI   Genitourinary:  Negative for difficulty urinating.   Skin: Negative.    Neurological:  Negative for dizziness and headaches.   Psychiatric/Behavioral: Negative.        Past Medical History:     Past Medical History:   Diagnosis Date   • Common wart     Resolved 2016    • Ganglion cyst     Resolved 2016    • Migraine with aura     Resolved 2016    • Varicella     vaccine      Past Surgical History:     Past Surgical History:   Procedure Laterality Date   • DENTAL SURGERY     • [facility]    Procedure:  SECTION ();  Surgeon: Yandy Velasquez MD;  Location: [facility] ;  Service: Obstetrics      Social History:     Social History     Socioeconomic History   • Marital status: /Civil Union     Spouse name: None   • Number of children: None   • Years of education: None   • Highest education level: None   Occupational History   • None   Tobacco Use   • Smoking status: Never   • Smokeless tobacco: Never   Vaping Use   • Vaping status: Never Used   Substance and Sexual Activity   • Alcohol use: Yes     Comment: social   • Drug use: No   • Sexual activity: Yes      Partners: Male     Birth control/protection: None   Other Topics Concern   • None   Social History Narrative   • None     Social Determinants of Health     Financial Resource Strain: Not on file   Food Insecurity: Not on file   Transportation Needs: Not on file   Physical Activity: Not on file   Stress: Not on file   Social Connections: Not on file   Intimate Partner Violence: Not on file   Housing Stability: Not on file      Family History:     Family History   Problem Relation Age of Onset   • Arthritis Mother    • Deep vein thrombosis Father    • Colon cancer Maternal Grandmother    • No Known Problems Sister    • Prostate cancer Maternal Grandfather    • Skin cancer Maternal Grandfather    • Hypertension Maternal Grandfather    • Cancer Maternal Grandfather         bladder cancer    • Ovarian cancer Paternal Grandmother    • Breast cancer Maternal Aunt         unsure age       Current Medications:     No current outpatient medications on file.     No current facility-administered medications for this visit.      Allergies:     Allergies   Allergen Reactions   • Clindamycin Throat Swelling   • Sumatriptan Nausea Only and Throat Swelling   • Azithromycin GI Intolerance and Vomiting   • Penicillins Rash      Physical Exam:     /78 (BP Location: Left arm, Patient Position: Sitting, Cuff Size: Standard)   Pulse 95   Temp 97.8 °F (36.6 °C) (Temporal)   Ht 6' (1.829 m)   Wt 82.6 kg (182 lb)   SpO2 99%   BMI 24.68 kg/m²     Physical Exam  Vitals and nursing note reviewed.   Constitutional:       General: She is not in acute distress.     Appearance: She is well-developed.   HENT:      Head: Normocephalic.      Right Ear: Tympanic membrane normal.      Left Ear: Tympanic membrane normal.      Mouth/Throat:      Pharynx: No posterior oropharyngeal erythema.   Eyes:      General: No scleral icterus.  Neck:      Thyroid: No thyromegaly.      Vascular: No carotid bruit.   Cardiovascular:      Rate and Rhythm:  Normal rate and regular rhythm.      Heart sounds: Normal heart sounds. No murmur heard.  Pulmonary:      Effort: Pulmonary effort is normal.      Breath sounds: Normal breath sounds.   Abdominal:      General: Bowel sounds are normal.      Palpations: Abdomen is soft.   Musculoskeletal:      Right lower leg: No edema.      Left lower leg: No edema.   Lymphadenopathy:      Cervical: No cervical adenopathy.   Skin:     General: Skin is warm and dry.   Neurological:      Mental Status: She is alert and oriented to person, place, and time.   Psychiatric:         Mood and Affect: Mood normal.          Sultana Bennett DO   [facility]

## 2024-04-07 ENCOUNTER — HOSPITAL ENCOUNTER (EMERGENCY)
Facility: HOSPITAL | Age: 29
Discharge: HOME | End: 2024-04-08
Attending: EMERGENCY MEDICINE | Admitting: EMERGENCY MEDICINE
Payer: COMMERCIAL

## 2024-04-07 ENCOUNTER — APPOINTMENT (EMERGENCY)
Dept: CT IMAGING | Facility: HOSPITAL | Age: 29
End: 2024-04-07
Payer: COMMERCIAL

## 2024-04-07 DIAGNOSIS — R10.2 PELVIC PAIN: Primary | ICD-10-CM

## 2024-04-07 LAB
ALBUMIN SERPL BCP-MCNC: 4 G/DL (ref 3.5–5)
ALP SERPL-CCNC: 51 U/L (ref 34–104)
ALT SERPL W P-5'-P-CCNC: 13 U/L (ref 7–52)
ANION GAP SERPL CALCULATED.3IONS-SCNC: 6 MMOL/L (ref 4–13)
AST SERPL W P-5'-P-CCNC: 14 U/L (ref 13–39)
BASOPHILS # BLD AUTO: 0.04 THOUSANDS/ÂΜL (ref 0–0.1)
BASOPHILS NFR BLD AUTO: 0 % (ref 0–1)
BILIRUB SERPL-MCNC: 0.44 MG/DL (ref 0.2–1)
BILIRUB UR QL STRIP: NEGATIVE
BUN SERPL-MCNC: 14 MG/DL (ref 5–25)
CALCIUM SERPL-MCNC: 8.4 MG/DL (ref 8.4–10.2)
CHLORIDE SERPL-SCNC: 107 MMOL/L (ref 96–108)
CLARITY UR: CLEAR
CO2 SERPL-SCNC: 24 MMOL/L (ref 21–32)
COLOR UR: YELLOW
CREAT SERPL-MCNC: 0.66 MG/DL (ref 0.6–1.3)
EOSINOPHIL # BLD AUTO: 0.15 THOUSAND/ÂΜL (ref 0–0.61)
EOSINOPHIL NFR BLD AUTO: 1 % (ref 0–6)
ERYTHROCYTE [DISTWIDTH] IN BLOOD BY AUTOMATED COUNT: 11.9 % (ref 11.6–15.1)
EXT PREGNANCY TEST URINE: NEGATIVE
EXT. CONTROL: NORMAL
GFR SERPL CREATININE-BSD FRML MDRD: 121 ML/MIN/1.73SQ M
GLUCOSE SERPL-MCNC: 110 MG/DL (ref 65–140)
GLUCOSE UR STRIP-MCNC: NEGATIVE MG/DL
HCT VFR BLD AUTO: 39.4 % (ref 34.8–46.1)
HGB BLD-MCNC: 12 G/DL (ref 11.5–15.4)
HGB UR QL STRIP.AUTO: NEGATIVE
IMM GRANULOCYTES # BLD AUTO: 0.04 THOUSAND/UL (ref 0–0.2)
IMM GRANULOCYTES NFR BLD AUTO: 0 % (ref 0–2)
KETONES UR STRIP-MCNC: NEGATIVE MG/DL
LEUKOCYTE ESTERASE UR QL STRIP: NEGATIVE
LIPASE SERPL-CCNC: 66 U/L (ref 11–82)
LYMPHOCYTES # BLD AUTO: 3.48 THOUSANDS/ÂΜL (ref 0.6–4.47)
LYMPHOCYTES NFR BLD AUTO: 30 % (ref 14–44)
MCH RBC QN AUTO: 29.5 PG (ref 26.8–34.3)
MCHC RBC AUTO-ENTMCNC: 30.5 G/DL (ref 31.4–37.4)
MCV RBC AUTO: 97 FL (ref 82–98)
MONOCYTES # BLD AUTO: 0.88 THOUSAND/ÂΜL (ref 0.17–1.22)
MONOCYTES NFR BLD AUTO: 8 % (ref 4–12)
NEUTROPHILS # BLD AUTO: 6.84 THOUSANDS/ÂΜL (ref 1.85–7.62)
NEUTS SEG NFR BLD AUTO: 61 % (ref 43–75)
NITRITE UR QL STRIP: NEGATIVE
NRBC BLD AUTO-RTO: 0 /100 WBCS
PH UR STRIP.AUTO: 6 [PH]
PLATELET # BLD AUTO: 208 THOUSANDS/UL (ref 149–390)
PMV BLD AUTO: 10.6 FL (ref 8.9–12.7)
POTASSIUM SERPL-SCNC: 3.6 MMOL/L (ref 3.5–5.3)
PROT SERPL-MCNC: 6.4 G/DL (ref 6.4–8.4)
PROT UR STRIP-MCNC: NEGATIVE MG/DL
RBC # BLD AUTO: 4.07 MILLION/UL (ref 3.81–5.12)
SODIUM SERPL-SCNC: 137 MMOL/L (ref 135–147)
SP GR UR STRIP.AUTO: 1.02 (ref 1–1.03)
UROBILINOGEN UR STRIP-ACNC: <2 MG/DL
WBC # BLD AUTO: 11.43 THOUSAND/UL (ref 4.31–10.16)

## 2024-04-07 PROCEDURE — 74177 CT ABD & PELVIS W/CONTRAST: CPT

## 2024-04-07 PROCEDURE — 36415 COLL VENOUS BLD VENIPUNCTURE: CPT | Performed by: EMERGENCY MEDICINE

## 2024-04-07 PROCEDURE — 81025 URINE PREGNANCY TEST: CPT | Performed by: EMERGENCY MEDICINE

## 2024-04-07 PROCEDURE — 99285 EMERGENCY DEPT VISIT HI MDM: CPT | Performed by: EMERGENCY MEDICINE

## 2024-04-07 PROCEDURE — 80053 COMPREHEN METABOLIC PANEL: CPT | Performed by: EMERGENCY MEDICINE

## 2024-04-07 PROCEDURE — 83690 ASSAY OF LIPASE: CPT | Performed by: EMERGENCY MEDICINE

## 2024-04-07 PROCEDURE — 85025 COMPLETE CBC W/AUTO DIFF WBC: CPT | Performed by: EMERGENCY MEDICINE

## 2024-04-07 PROCEDURE — 81003 URINALYSIS AUTO W/O SCOPE: CPT | Performed by: EMERGENCY MEDICINE

## 2024-04-07 RX ORDER — ONDANSETRON 2 MG/ML
4 INJECTION INTRAMUSCULAR; INTRAVENOUS ONCE
Status: COMPLETED | OUTPATIENT
Start: 2024-04-07 | End: 2024-04-07

## 2024-04-07 RX ADMIN — IOHEXOL 100 ML: 350 INJECTION, SOLUTION INTRAVENOUS at 23:43

## 2024-04-07 RX ADMIN — SODIUM CHLORIDE 1000 ML: 0.9 INJECTION, SOLUTION INTRAVENOUS at 22:48

## 2024-04-07 RX ADMIN — ONDANSETRON 4 MG: 2 INJECTION INTRAMUSCULAR; INTRAVENOUS at 22:48

## 2024-04-08 ENCOUNTER — NURSE TRIAGE (OUTPATIENT)
Age: 29
End: 2024-04-08

## 2024-04-08 VITALS
RESPIRATION RATE: 16 BRPM | SYSTOLIC BLOOD PRESSURE: 128 MMHG | HEIGHT: 72 IN | HEART RATE: 82 BPM | TEMPERATURE: 97.8 F | DIASTOLIC BLOOD PRESSURE: 80 MMHG | WEIGHT: 182 LBS | OXYGEN SATURATION: 98 % | BODY MASS INDEX: 24.65 KG/M2

## 2024-04-08 NOTE — ED CARE HANDOFF
Emergency Department Sign Out Note        Sign out and transfer of care from Dr. Fuller. See Separate Emergency Department note.     The patient, Ailyn Muro, was evaluated by the previous provider for abdominal pain.                                ED Course as of 04/08/24 0042 Mon Apr 08, 2024   0041 Patient with pelvic pain received in signout from Dr. Fuller, had some postcoital pelvic pain which since improved significantly, no current pain, CT without any acute pathology, does have a left ovarian corpus luteum and some free fluid in the pelvis however no concerning ovarian cysts, as pain significantly improved will have patient follow-up with OB/GYN as an outpatient with careful return precautions to return for any worsening symptoms     Procedures  Medical Decision Making  Amount and/or Complexity of Data Reviewed  Labs: ordered.  Radiology: ordered.    Risk  Prescription drug management.            Disposition  Final diagnoses:   Pelvic pain     Time reflects when diagnosis was documented in both MDM as applicable and the Disposition within this note       Time User Action Codes Description Comment    4/8/2024 12:40 AM Nya River [R10.2] Pelvic pain           ED Disposition       ED Disposition   Discharge    Condition   Stable    Date/Time   Mon Apr 8, 2024 12:41 AM    Comment   Ailyn Muro discharge to home/self care.                   Follow-up Information       Follow up With Specialties Details Why Contact Info Additional Information    Sultana Bennett,  Family Medicine Schedule an appointment as soon as possible for a visit   [address]  [address]  [facility] [id number] [phone]        [facility] Emergency Department Emergency Medicine  If symptoms worsen [address] [facility] [id number] [phone] [facility] Emergency Department, [address] [place], [place] [address]          Patient's Medications    No  medications on file     No discharge procedures on file.       ED Provider  Electronically Signed by     Nya River DO  04/08/24 0042

## 2024-04-08 NOTE — TELEPHONE ENCOUNTER
Regarding: soreness lower left pelvic pain  ----- Message from Silvana Martinez sent at 4/8/2024  8:39 AM EDT -----  Patient went to ER last night with sudden excruciating pain got better during night. Has soreness lower left pain pelvic pain.

## 2024-04-08 NOTE — TELEPHONE ENCOUNTER
"Patient reports going to the Er yesterday due to having left side pelvic pain that was severe.  She states she got a cat scan done and it showed the corpus luteum.  She states the pain is subsiding since yesterday and relieved by over the counter medication.  Offered the patient an appointment to be further evaluated.  Patient has an annual scheduled at the beginning of May. Patient declines appointment and wishes to wait until her next appointment to discuss symptoms from yesterday. Advied patient to call back if symptoms worsen.  She voiced appreciation.     Reason for Disposition   MILD to MODERATE pelvic pain and occurs in the middle of menstrual cycle (2 weeks after first day of period)    Answer Assessment - Initial Assessment Questions  1. LOCATION: \"Where does it hurt?\"       Left lower pelvic area   2. RADIATION: \"Does the pain shoot anywhere else?\" (e.g., lower back, groin, thighs)      Denies   3. ONSET: \"When did the pain begin?\" (e.g., minutes, hours or days ago)       Yesterday   4. SUDDEN: \"Gradual or sudden onset?\"      Sudden   5. PATTERN \"Does the pain come and go, or is it constant?\"     - If constant: \"Is it getting better, staying the same, or worsening?\"       (Note: Constant means the pain never goes away completely; most serious pain is constant and gets worse over time)      - If intermittent: \"How long does it last?\" \"Do you have pain now?\"      (Note: Intermittent means the pain goes away completely between bouts)      Comes and goes   6. SEVERITY: \"How bad is the pain?\"  (e.g., Scale 1-10; mild, moderate, or severe)    - MILD (1-3): doesn't interfere with normal activities, area soft and not tender to touch     - MODERATE (4-7): interferes with normal activities or awakens from sleep, tender to touch     - SEVERE (8-10): excruciating pain, doubled over, unable to do any normal activities       3/10  7. RECURRENT SYMPTOM: \"Have you ever had this type of pelvic pain before?\" If Yes, ask: " "\"When was the last time?\" and \"What happened that time?\"       Denies   8. CAUSE: \"What do you think is causing the pelvic pain?\"      Ovarian cyst rupture   9. RELIEVING/AGGRAVATING FACTORS: \"What makes it better or worse?\" (e.g., activity/rest, sexual intercourse, voiding, passing stool)      Helps the pain when laying down   10. OTHER SYMPTOMS: \"Has there been any other symptoms?\" (e.g., fever, vaginal bleeding, vaginal discharge, diarrhea, constipation, or voiding problems?\"        Was vomiting yesterday and went to the ER  11. PREGNANCY: \"Is there any chance you are pregnant?\" \"When was your last menstrual period?\"        Denies    Protocols used: Pelvic Pain - Female-ADULT-[place]    "

## 2024-04-08 NOTE — ED PROVIDER NOTES
History  Chief Complaint   Patient presents with    Abdominal Pain     Pt states that while she was having sex she started with lower abd pain and feeling nauseated. Pt states that she is feeling a little better now. Pt denies taking any OTC meds for pain      28 yof presents for eval of lower abdominal pain that started shortly PTA. Was having sexual intercourse and pain started gradually. Stopped intercourse but pain got worse and severe. 1 episode of vomiting. Symptoms started to improve on ride to ED. Denies having previously. No vaginal bleeding/discharge. Denies urinary symptoms.         None       Past Medical History:   Diagnosis Date    Common wart     Resolved 2016     Ganglion cyst     Resolved 2016     Migraine with aura     Resolved 2016     Varicella     vaccine       Past Surgical History:   Procedure Laterality Date    DENTAL SURGERY      [place]  DELIVERY ONLY N/A 2022    Procedure:  SECTION ();  Surgeon: Yandy Velasquez MD;  Location: [facility] ;  Service: Obstetrics       Family History   Problem Relation Age of Onset    Arthritis Mother     Deep vein thrombosis Father     Colon cancer Maternal Grandmother     No Known Problems Sister     Prostate cancer Maternal Grandfather     Skin cancer Maternal Grandfather     Hypertension Maternal Grandfather     Cancer Maternal Grandfather         bladder cancer     Ovarian cancer Paternal Grandmother     Breast cancer Maternal Aunt         unsure age      I have reviewed and agree with the history as documented.    E-Cigarette/Vaping    E-Cigarette Use Never User      E-Cigarette/Vaping Substances    Nicotine No     THC No     CBD No     Flavoring No     Other No     Unknown No      Social History     Tobacco Use    Smoking status: Never    Smokeless tobacco: Never   Vaping Use    Vaping status: Never Used   Substance Use Topics    Alcohol use: Yes     Comment: social    Drug use: No       Review of Systems    Constitutional:  Negative for fever.   Gastrointestinal:  Positive for abdominal pain.       Physical Exam  Physical Exam  Vitals and nursing note reviewed.   Constitutional:       Appearance: She is well-developed.   HENT:      Head: Normocephalic and atraumatic.      Right Ear: External ear normal.      Left Ear: External ear normal.      Nose: Nose normal.   Eyes:      General: No scleral icterus.  Cardiovascular:      Rate and Rhythm: Normal rate.   Pulmonary:      Effort: Pulmonary effort is normal. No respiratory distress.   Abdominal:      General: There is no distension.      Tenderness: There is abdominal tenderness in the right lower quadrant, periumbilical area and suprapubic area.   Musculoskeletal:         General: No deformity. Normal range of motion.      Cervical back: Normal range of motion.   Skin:     Findings: No rash.   Neurological:      General: No focal deficit present.      Mental Status: She is alert and oriented to person, place, and time.   Psychiatric:         Mood and Affect: Mood normal.         Vital Signs  ED Triage Vitals   Temperature Pulse Respirations Blood Pressure SpO2   04/07/24 2204 04/07/24 2204 04/07/24 2204 04/07/24 2204 04/07/24 2204   97.8 °F (36.6 °C) 89 18 133/85 98 %      Temp Source Heart Rate Source Patient Position - Orthostatic VS BP Location FiO2 (%)   04/07/24 2204 04/08/24 0000 -- 04/08/24 0000 --   Temporal Monitor  Right arm       Pain Score       --                  Vitals:    04/07/24 2204 04/08/24 0000   BP: 133/85 128/80   Pulse: 89 82         Visual Acuity      ED Medications  Medications   ondansetron (ZOFRAN) injection 4 mg (4 mg Intravenous Given 4/7/24 2248)   sodium chloride 0.9 % bolus 1,000 mL (0 mL Intravenous Stopped 4/7/24 2348)   iohexol (OMNIPAQUE) 350 MG/ML injection (MULTI-DOSE) 100 mL (100 mL Intravenous Given 4/7/24 2343)       Diagnostic Studies  Results Reviewed       Procedure Component Value Units Date/Time    Lipase [[id number]]   (Normal) Collected: 04/07/24 2239    Lab Status: Final result Specimen: Blood from Arm, Left Updated: 04/07/24 2302     Lipase 66 u/L     CMP [[id number]] Collected: 04/07/24 2239    Lab Status: Final result Specimen: Blood from Arm, Left Updated: 04/07/24 2302     Sodium 137 mmol/L      Potassium 3.6 mmol/L      Chloride 107 mmol/L      CO2 24 mmol/L      ANION GAP 6 mmol/L      BUN 14 mg/dL      Creatinine 0.66 mg/dL      Glucose 110 mg/dL      Calcium 8.4 mg/dL      AST 14 U/L      ALT 13 U/L      Alkaline Phosphatase 51 U/L      Total Protein 6.4 g/dL      Albumin 4.0 g/dL      Total Bilirubin 0.44 mg/dL      eGFR 121 ml/min/1.73sq m     Narrative:      National Kidney Disease Foundation guidelines for Chronic Kidney Disease (CKD):     Stage 1 with normal or high GFR (GFR > 90 mL/min/1.73 square meters)    Stage 2 Mild CKD (GFR = 60-89 mL/min/1.73 square meters)    Stage 3A Moderate CKD (GFR = 45-59 mL/min/1.73 square meters)    Stage 3B Moderate CKD (GFR = 30-44 mL/min/1.73 square meters)    Stage 4 Severe CKD (GFR = 15-29 mL/min/1.73 square meters)    Stage 5 End Stage CKD (GFR <15 mL/min/1.73 square meters)  Note: GFR calculation is accurate only with a steady state creatinine    UA w Reflex to Microscopic w Reflex to Culture [[id number]] Collected: 04/07/24 2240    Lab Status: Final result Specimen: Urine, Other Updated: 04/07/24 2249     Color, UA Yellow     Clarity, UA Clear     Specific Gravity, UA 1.025     pH, UA 6.0     Leukocytes, UA Negative     Nitrite, UA Negative     Protein, UA Negative mg/dl      Glucose, UA Negative mg/dl      Ketones, UA Negative mg/dl      Urobilinogen, UA <2.0 mg/dl      Bilirubin, UA Negative     Occult Blood, UA Negative    CBC and differential [[id number]]  (Abnormal) Collected: 04/07/24 2239    Lab Status: Final result Specimen: Blood from Arm, Left Updated: 04/07/24 2246     WBC 11.43 Thousand/uL      RBC 4.07 Million/uL      Hemoglobin 12.0 g/dL      Hematocrit 39.4 %       MCV 97 fL      MCH 29.5 pg      MCHC 30.5 g/dL      RDW 11.9 %      MPV 10.6 fL      Platelets 208 Thousands/uL      nRBC 0 /100 WBCs      Segmented % 61 %      Immature Grans % 0 %      Lymphocytes % 30 %      Monocytes % 8 %      Eosinophils Relative 1 %      Basophils Relative 0 %      Absolute Neutrophils 6.84 Thousands/µL      Absolute Immature Grans 0.04 Thousand/uL      Absolute Lymphocytes 3.48 Thousands/µL      Absolute Monocytes 0.88 Thousand/µL      Eosinophils Absolute 0.15 Thousand/µL      Basophils Absolute 0.04 Thousands/µL     POCT pregnancy, urine [[id number]]  (Normal) Resulted: 04/07/24 2244    Lab Status: Final result Specimen: Urine Updated: 04/07/24 2244     EXT Preg Test, Ur Negative     Control Valid                   CT Abdomen pelvis with contrast   Final Result by Marquis Lynn MD (04/08 0037)      No acute inflammatory process is identified within the abdomen or pelvis.         Workstation performed: [id number]                    Procedures  Procedures         ED Course                               SBIRT 22yo+      Flowsheet Row Most Recent Value   Initial Alcohol Screen: US AUDIT-C     1. How often do you have a drink containing alcohol? 0 Filed at: 04/07/2024 2244   2. How many drinks containing alcohol do you have on a typical day you are drinking?  0 Filed at: 04/07/2024 2244   3b. FEMALE Any Age, or MALE 65+: How often do you have 4 or more drinks on one occassion? 0 Filed at: 04/07/2024 2244   Audit-C Score 0 Filed at: 04/07/2024 2244   OMAYRA: How many times in the past year have you...    Used an illegal drug or used a prescription medication for non-medical reasons? Never Filed at: 04/07/2024 2244                      Medical Decision Making  28 yof with lower abdominal pain. Ddx appendicitis,  pathology, ectopic. Obtain labs, UA/preg, CTAP. Asked about pain medication but declined as she feels much improved.     Amount and/or Complexity of Data Reviewed  Labs:  ordered.  Radiology: ordered.    Risk  Prescription drug management.             Disposition  Final diagnoses:   Pelvic pain     Time reflects when diagnosis was documented in both MDM as applicable and the Disposition within this note       Time User Action Codes Description Comment    4/8/2024 12:40 AM Nya River [R10.2] Pelvic pain           ED Disposition       ED Disposition   Discharge    Condition   Stable    Date/Time   Mon Apr 8, 2024 0041    Comment   Ailyn Muro discharge to home/self care.                   Follow-up Information       Follow up With Specialties Details Why Contact Info Additional Information    Sultana Bennett DO Family Medicine Schedule an appointment as soon as possible for a visit   [address]  [address]  [facility] [address]  [phone]        [facility] Emergency Department Emergency Medicine  If symptoms worsen [address] [address] [phone] [facility] Emergency Department, [address] [place], [place] [address]            There are no discharge medications for this patient.      No discharge procedures on file.    PDMP Review       None            ED Provider  Electronically Signed by             Gio Fuller DO  04/10/24 0643

## 2024-04-08 NOTE — DISCHARGE INSTRUCTIONS
Please follow-up with OB/GYN for further care, if symptoms worsen please return to the emergency department

## 2024-04-12 DIAGNOSIS — R14.0 BLOATING: Primary | ICD-10-CM

## 2024-04-12 DIAGNOSIS — R10.84 GENERALIZED ABDOMINAL PAIN: ICD-10-CM

## 2024-04-17 ENCOUNTER — NURSE TRIAGE (OUTPATIENT)
Age: 29
End: 2024-04-17

## 2024-04-17 ENCOUNTER — TELEPHONE (OUTPATIENT)
Age: 29
End: 2024-04-17

## 2024-04-17 NOTE — TELEPHONE ENCOUNTER
Patient is requesting an insurance referral for the following specialty:      Test Name / Order Name:     DX Code: R14.0 [ICD-10-CM]) (R10.84 [ICD-10-CM     Date Of Service: 04/22/24    Location/Facility Name/Address/Phone #:   [phone] [address]  [address]  [place] [address]  Location / Facility NPI: [id number]    Best Phone # To Reach The Patient:  [phone]

## 2024-04-17 NOTE — TELEPHONE ENCOUNTER
"Bhavna from PCP office called to confirm provider for GI appt consult as pt needs insurance referral. Advised Lamonte Gilbert. PA-C   Answer Assessment - Initial Assessment Questions  1. REASON FOR CALL or QUESTION: \"What is your reason for calling today?\" or \"How can I best help you?\" or \"What question do you have that I can help answer?\"      PCP office calling for provider info.    Protocols used: Information Only Call - No Triage-ADULT-[place]    "

## 2024-04-18 ENCOUNTER — TELEPHONE (OUTPATIENT)
Age: 29
End: 2024-04-18

## 2024-04-22 ENCOUNTER — OFFICE VISIT (OUTPATIENT)
Dept: GASTROENTEROLOGY | Facility: CLINIC | Age: 29
End: 2024-04-22
Payer: COMMERCIAL

## 2024-04-22 VITALS
HEIGHT: 72 IN | TEMPERATURE: 98.6 F | SYSTOLIC BLOOD PRESSURE: 117 MMHG | WEIGHT: 184.2 LBS | BODY MASS INDEX: 24.95 KG/M2 | OXYGEN SATURATION: 99 % | HEART RATE: 61 BPM | DIASTOLIC BLOOD PRESSURE: 81 MMHG

## 2024-04-22 DIAGNOSIS — R14.0 BLOATING: Primary | ICD-10-CM

## 2024-04-22 DIAGNOSIS — R10.84 GENERALIZED ABDOMINAL PAIN: ICD-10-CM

## 2024-04-22 DIAGNOSIS — K59.00 CONSTIPATION, UNSPECIFIED CONSTIPATION TYPE: ICD-10-CM

## 2024-04-22 PROCEDURE — 99204 OFFICE O/P NEW MOD 45 MIN: CPT | Performed by: DIETITIAN, REGISTERED

## 2024-04-22 RX ORDER — POLYETHYLENE GLYCOL 3350 17 G/17G
17 POWDER, FOR SOLUTION ORAL DAILY
Qty: 30 EACH | Refills: 3 | Status: SHIPPED | OUTPATIENT
Start: 2024-04-22

## 2024-04-22 NOTE — PATIENT INSTRUCTIONS
Ailyn Muro  4/22/2024     Recommended Total Fiber Intake**    AGE  MEN  WOMAN    19-50  38 grams/day  25 grams/day    Over 50  30 grams/day  21 grams/day    Fiber Sources in Common Foods   Use this guide to find out if you have enough fiber in you diet.    Food  Size of Serving  Fiber Grams/Servings  Calories/   Serving  Food  Size of Serving  Fiber Grams/Servings  Calories/   Serving    Fruits: (raw unless otherwise noted  Vegetables: (cooked, unless otherwise noted)    Apple (w/peel)  1 medium  3.7  81  Artichoke  1 globe  6.5  60    Apricots  1 cup  3.7  74  Asparagus  ½ cup  1.8  25    Banana  1 medium  2.7  105  Beans:    Blackberries  1 cup  7.2  75  Green (canned)  ½ cup  1.3  14    Blueberries  1 cup  3.9  81  Kidney  ½ cup  5.7  114    Cantaloupe  1 cup  1.3  56  Lima  ½ cup  6.1  85    Grapefruit  1 medium  2.8  82  Bean  ½ cup  7.4  118    Grapes  1 cup  1.6  114  White  ½ cup  5.5  122    Orange  1 medium  3.1  62  Beets  ½ cup  1.6  37    Pear (with peel)  1 medium  4.0  98  Broccoli  ½ cup  2.8  26    Pineapple  1 cup  1.9  76  Cabbage, green  ½ cup  2.1  16    Plums  1 medium  1.0  36  Cabbage, green (raw)  ½ cup  0.8  9    Prunes (dried)  1 cup  11.4  386  Carrots  ½ cup  2.6  35    Raspberries  1 cup  8.4  60  Cauliflower  ½ cup  2.0  17    Strawberries  1 cup  3.4  45  Cauliflower (raw)  ½ cup  1.3  13    Watermelon  1 slice  0.8  51  Celery (raw)  ½ cup  1.0  10    GRAIN PRODUCTS AND OTHERS:  Corn  ½ cup  2.0  66    Bread:  Cucumber (raw)  ½ cup  0.4  7    Georgian  1 slice  0.8  68  Eggplant  ½ cup  1.2  13    Rye  1 slice  1.6  67  Green Peas  ½ cup  4.4  62    White  1 slice  0.6  67  Lettuce, iceberg (raw)  ½ cup  0.4  4    Whole Wheat  1 slice  2.0  70  Onions (raw)  ½ cup  1.4  30    Cereal:  Potato (baked with skin)  ½ cup  1.5  66    Bran  1 ounce  9.7  70  Spinach  ½ cup  2.7  25    Corn Flakes  1 ounce  1.0  110  Tomato  ½ cup  1.0  19    Oat Bran  1 ounce  4.3  69  Zucchini  ½ cup   1.3  14    Oatmeal  1 ounce  3.0  109  METAMUCIL:    Shredded Wheat  1 ounce  2.8  102  Capsules  6 capsules  3.0  10    Crackers:  Smooth Texture Orange (sugar free)  1 tsp  3.0  20    Rasta  1 square  0.1  27  Smooth Texture Orange (with sugar)  1 tbsp  3.0  45    Saltine  1 regular  0.1  13  Wafers  2 wafers  3.0  120    Rice:    Brown  ½ cup  1.8  108    White  ½ cup  0.3  103    Spaghetti  2 ounces  2.1  225    Almonds (roasted)  ½ cup  6.4  351    Peanuts (roasted)  ½ cup  6.1  388      ** [place] [employer], The National Academy of Sciences, 2002   Track your fiber intake for five days. Use the Fiber Source Guide to find out how much fiber is in common food.     If you’re not getting your recommended amount of fiber each day, talk to your doctor about how you can increase the fiber in your diet. Example  Monday Tuesday Wednesday Thursday Friday    Food  Oatmeal    Fiber Grams  2.8    Food  Blueberries    Fiber Grams  3.9    Food  W.W. Bread    Fiber Grams  1.9    Food  W.W. Bread    Fiber Grams  1.9    Food  Apple    Fiber Grams  3.7    Food  Spaghetti    Fiber Grams  .14    Food  Corn    Fiber Grams  2.0    Food  White Bread    Fiber Grams  .6    Food    Fiber Grams    Food    Fiber Grams    Food    Fiber Grams    Food    Fiber Grams    Food    Fiber Grams    Food    Fiber Grams    Food    Fiber Grams    Food    Fiber Grams    Food    Fiber Grams    Food    Fiber Grams    Food    Fiber Grams    Food    Fiber Grams    Add numbers in each column to find your daily fiber intake.    Total Daily Fiber Intake  18.2      Too Low - Like most Americans, this example is not enough fiber. Talk to your doctor about how to add fiber to your diet.   Quick Fiber Facts    Most Americans consume only about half of the recommended fiber they need each day.    Fiber helps maintain normal bowel function, and helps prevent constipation and its potential complications. Straining and pressure from constipation may lead to  diverticular disease and hemorrhoids.    Stool softeners or stimulant laxatives only offer short-term relief of constipation, while dietary changes or fiber therapies help break the cycle of irregularity.    Diets low in saturated fat and cholesterol that include 7 grams of soluble fiber per day from psyllium husk, as in Metamucil, may reduce the risk of heart disease by lowering cholesterol. One adult dose of Metamucil has 2.4 grams of this soluable fiber.    Increase fiber intake gradually, giving the body time to adjust.       High Fiber Diet   WHAT YOU NEED TO KNOW:   What is a high-fiber diet?  A high-fiber diet includes foods that have a high amount of fiber. Fiber is the part of fruits, vegetables, and grains that is not broken down by your body. Fiber keeps your bowel movements regular. Fiber can also help lower your cholesterol level, control blood sugar in people with diabetes, and relieve constipation. Fiber can also help you control your weight because it helps you feel full faster. Most adults should eat 25 to 35 grams of fiber each day. Talk to your dietitian or healthcare provider about the amount of fiber you need.  What foods are good sources of fiber?       Foods with at least 4 grams of fiber per serving:      ? to ½ cup of high-fiber cereal (check the nutrition label on the box)    ½ cup of blackberries or raspberries    4 dried prunes    1 cooked artichoke    ½ cup of cooked legumes, such as lentils, or red, kidney, and ashton beans    Foods with 1 to 3 grams of fiber per servin slice of whole-wheat, pumpernickel, or rye bread    ½ cup of cooked brown rice    4 whole-wheat crackers    1 cup of oatmeal    ½ cup of cereal with 1 to 3 grams of fiber per serving (check the nutrition label on the box)    1 small piece of fruit, such as an apple, banana, pear, kiwi, or orange    3 dates    ½ cup of canned apricots, fruit cocktail, peaches, or pears    ½ cup of raw or cooked vegetables, such as  carrots, cauliflower, cabbage, spinach, squash, or corn  What are some ways that I can increase fiber in my diet?   Choose brown or wild rice instead of white rice.     Use whole wheat flour in recipes instead of white or all-purpose flour.     Add beans and peas to casseroles or soups.     Choose fresh fruit and vegetables with peels or skins on instead of juices.    What other guidelines should I follow?   Add fiber to your diet slowly.  You may have abdominal discomfort, bloating, and gas if you add fiber to your diet too quickly.     Drink plenty of liquids as you add fiber to your diet.  You may have nausea or develop constipation if you do not drink enough water. Ask how much liquid to drink each day and which liquids are best for you.    CARE AGREEMENT:   You have the right to help plan your care. Discuss treatment options with your healthcare provider to decide what care you want to receive. You always have the right to refuse treatment. The above information is an  only. It is not intended as medical advice for individual conditions or treatments. Talk to your doctor, nurse or pharmacist before following any medical regimen to see if it is safe and effective for you.  © Copyright [employer] 2023 Information is for End User's use only and may not be sold, redistributed or otherwise used for commercial purposes.

## 2024-04-22 NOTE — PROGRESS NOTES
[facility] - Outpatient Consultation New Patient  Ailyn Muro 28 y.o. female MRN: [id number]  Encounter: [id number]          ASSESSMENT AND PLAN:    1.  Bloating   2.  Generalized abdominal pain  3.  Constipation  Patient reports chronic history of bloating, lower abdominal cramping, gas pains, and mild constipation for the last 10+ years.  Recently this has been worsening/becoming more bothersome to her life.  Her symptoms are very unpredictable.  Has tried the low FODMAP diet but did not find significant benefit.  Sometimes she feels stress may be a trigger, but not always.  Bloating and abdominal cramping tend to worsen with meals.  She reports she typically has 1 BM per day on average but sometimes more, often has small/incomplete bowel movements.  Denies any rectal bleeding, unintentional weight loss, heartburn, indigestion, nausea, vomiting.  Her sister has similar issues but no known diagnosis.  Otherwise family history is significant for maternal grandmother with colorectal cancer diagnosed in her 40s.    -Check TSH and CRP.  -Check SIBO testing.  -Continue with excellent water intake >80 oz daily.  -Slowly increase fiber intake as able, to goal of 20 gm daily.  Advised patient increasing fiber too fast can contribute to bloating.  -Start Miralax 17 gm daily.      Follow up 3 months.    ________________________________________________________    HPI:  Ailyn Muro is a 28 y.o. female who presents for evaluation of abdominal pain/bloating.    Reviewed notes from ED visit 4/7/2024, at which time patient presented with lower abdominal pain that started with sexual intercourse and had associated vomiting.  This pain improved on its own.  CBC noted white count 11.43, MCHC 30.5.  CMP and lipase normal.  UA normal and pregnancy test negative.  CT abdomen pelvis normal, noted left ovarian corpus luteum.      Patient reports chronic history of bloating, lower abdominal cramping, gas  pains, and mild constipation for the last 10+ years.  Recently this has been worsening/becoming more bothersome to her life.  Her symptoms are very unpredictable.  Has tried the low FODMAP diet but did not find significant benefit.  Sometimes she feels stress may be a trigger, but not always.  Bloating and abdominal cramping tend to worsen with meals.  She reports she typically has 1 BM per day on average but sometimes more, often has small/incomplete bowel movements.  Denies any rectal bleeding, unintentional weight loss, heartburn, indigestion, nausea, vomiting.  Her sister has similar issues but no known diagnosis.  Otherwise family history is significant for maternal grandmother with colorectal cancer diagnosed in her 40s.    Patient had celiac serology checked last week, but no results available in chart yet.      REVIEW OF SYSTEMS:  10 point ROS reviewed and negative, except as above    Historical Information   Past Medical History:   Diagnosis Date    Common wart     Resolved 2016     Ganglion cyst     Resolved 2016     Migraine with aura     Resolved 2016     Varicella     vaccine     Past Surgical History:   Procedure Laterality Date    DENTAL SURGERY      [place]/A 2022    Procedure:  SECTION ();  Surgeon: Yandy Velasquez MD;  Location: [facility] ;  Service: Obstetrics     Social History   Social History     Substance and Sexual Activity   Alcohol Use Yes    Comment: social     Social History     Substance and Sexual Activity   Drug Use No     Social History     Tobacco Use   Smoking Status Never   Smokeless Tobacco Never     Family History   Problem Relation Age of Onset    Arthritis Mother     Deep vein thrombosis Father     Colon cancer Maternal Grandmother     No Known Problems Sister     Prostate cancer Maternal Grandfather     Skin cancer Maternal Grandfather     Hypertension Maternal Grandfather     Cancer Maternal Grandfather         bladder  cancer     Ovarian cancer Paternal Grandmother     Breast cancer Maternal Aunt         unsure age        Meds/Allergies     No current outpatient medications on file.    Allergies   Allergen Reactions    Clindamycin Throat Swelling    Sumatriptan Nausea Only and Throat Swelling    Azithromycin GI Intolerance and Vomiting    Penicillins Rash           Objective   Wt Readings from Last 3 Encounters:   04/07/24 82.6 kg (182 lb)   03/28/24 82.6 kg (182 lb)   05/03/23 80.3 kg (177 lb)     Temp Readings from Last 3 Encounters:   04/07/24 97.8 °F (36.6 °C) (Temporal)   03/28/24 97.8 °F (36.6 °C) (Temporal)   12/06/22 98 °F (36.7 °C) (Temporal)     BP Readings from Last 3 Encounters:   04/08/24 128/80   03/28/24 116/78   05/03/23 122/80     Pulse Readings from Last 3 Encounters:   04/08/24 82   03/28/24 95   12/06/22 (!) 126        PHYSICAL EXAM:     Physical Exam  Vitals reviewed.   Constitutional:       General: She is not in acute distress.     Appearance: She is well-developed.   HENT:      Head: Normocephalic and atraumatic.   Eyes:      Conjunctiva/sclera: Conjunctivae normal.   Cardiovascular:      Rate and Rhythm: Normal rate and regular rhythm.      Heart sounds: No murmur heard.  Pulmonary:      Effort: Pulmonary effort is normal. No respiratory distress.      Breath sounds: Normal breath sounds.   Abdominal:      General: Bowel sounds are normal. There is no distension.      Palpations: Abdomen is soft.      Tenderness: There is no abdominal tenderness. There is no guarding.   Musculoskeletal:         General: No swelling.      Cervical back: Neck supple.   Skin:     General: Skin is warm and dry.   Neurological:      Mental Status: She is alert.   Psychiatric:         Mood and Affect: Mood normal.             Lab Results:   No visits with results within 1 Day(s) from this visit.   Latest known visit with results is:   Admission on 04/07/2024, Discharged on 04/08/2024   Component Date Value    WBC 04/07/2024  11.43 (H)     RBC 04/07/2024 4.07     Hemoglobin 04/07/2024 12.0     Hematocrit 04/07/2024 39.4     MCV 04/07/2024 97     MCH 04/07/2024 29.5     MCHC 04/07/2024 30.5 (L)     RDW 04/07/2024 11.9     MPV 04/07/2024 10.6     Platelets 04/07/2024 208     nRBC 04/07/2024 0     Segmented % 04/07/2024 61     Immature Grans % 04/07/2024 0     Lymphocytes % 04/07/2024 30     Monocytes % 04/07/2024 8     Eosinophils Relative 04/07/2024 1     Basophils Relative 04/07/2024 0     Absolute Neutrophils 04/07/2024 6.84     Absolute Immature Grans 04/07/2024 0.04     Absolute Lymphocytes 04/07/2024 3.48     Absolute Monocytes 04/07/2024 0.88     Eosinophils Absolute 04/07/2024 0.15     Basophils Absolute 04/07/2024 0.04     Sodium 04/07/2024 137     Potassium 04/07/2024 3.6     Chloride 04/07/2024 107     CO2 04/07/2024 24     ANION GAP 04/07/2024 6     BUN 04/07/2024 14     Creatinine 04/07/2024 0.66     Glucose 04/07/2024 110     Calcium 04/07/2024 8.4     AST 04/07/2024 14     ALT 04/07/2024 13     Alkaline Phosphatase 04/07/2024 51     Total Protein 04/07/2024 6.4     Albumin 04/07/2024 4.0     Total Bilirubin 04/07/2024 0.44     eGFR 04/07/2024 121     Lipase 04/07/2024 66     EXT Preg Test, Ur 04/07/2024 Negative     Control 04/07/2024 Valid     Color, UA 04/07/2024 Yellow     Clarity, UA 04/07/2024 Clear     Specific Gravity, UA 04/07/2024 1.025     pH, UA 04/07/2024 6.0     Leukocytes, UA 04/07/2024 Negative     Nitrite, UA 04/07/2024 Negative     Protein, UA 04/07/2024 Negative     Glucose, UA 04/07/2024 Negative     Ketones, UA 04/07/2024 Negative     Urobilinogen, UA 04/07/2024 <2.0     Bilirubin, UA 04/07/2024 Negative     Occult Blood, UA 04/07/2024 Negative        Lab Results   Component Value Date    WBC 11.43 (H) 04/07/2024    HGB 12.0 04/07/2024    HCT 39.4 04/07/2024    MCV 97 04/07/2024     04/07/2024       Lab Results   Component Value Date    SODIUM 137 04/07/2024    K 3.6 04/07/2024     04/07/2024     CO2 24 04/07/2024    AGAP 6 04/07/2024    BUN 14 04/07/2024    CREATININE 0.66 04/07/2024    GLUC 110 04/07/2024    CALCIUM 8.4 04/07/2024    AST 14 04/07/2024    ALT 13 04/07/2024    ALKPHOS 51 04/07/2024    TP 6.4 04/07/2024    TBILI 0.44 04/07/2024    EGFR 121 04/07/2024         Radiology Results:   CT Abdomen pelvis with contrast    Result Date: 4/8/2024  Narrative: CT ABDOMEN AND PELVIS WITH IV CONTRAST INDICATION: RLQ pain. COMPARISON: None. TECHNIQUE: CT examination of the abdomen and pelvis was performed. Multiplanar 2D reformatted images were created from the source data. This examination, like all CT scans performed in the [facility], was performed utilizing techniques to minimize radiation dose exposure, including the use of iterative reconstruction and automated exposure control. Radiation dose length product (DLP) for this visit: 555.53 mGy-cm IV Contrast: 100 mL of iohexol (OMNIPAQUE) Enteric Contrast: Not administered. FINDINGS: ABDOMEN LOWER CHEST: No clinically significant abnormality in the visualized lower chest. LIVER/BILIARY TREE: Unremarkable. GALLBLADDER: No calcified gallstones. No pericholecystic inflammatory change. SPLEEN: Unremarkable. PANCREAS: Unremarkable. ADRENAL GLANDS: Unremarkable. KIDNEYS/URETERS: Unremarkable. No hydronephrosis. STOMACH AND BOWEL: Unremarkable. APPENDIX: Normal. ABDOMINOPELVIC CAVITY: Trace pelvic free fluid, likely physiologic. No pneumoperitoneum. No lymphadenopathy. VESSELS: Unremarkable for patient's age. PELVIS REPRODUCTIVE ORGANS: Left ovarian corpus luteum. URINARY BLADDER: Unremarkable. ABDOMINAL WALL/INGUINAL REGIONS: Unremarkable. BONES: No acute fracture or suspicious osseous lesion.     Impression: No acute inflammatory process is identified within the abdomen or pelvis. Workstation performed: [id number]

## 2024-04-23 LAB
IGA SERPL-MCNC: 148 MG/DL (ref 47–310)
TTG IGA SER-ACNC: <1 U/ML

## 2024-04-24 LAB
CRP SERPL-MCNC: <3 MG/L
TSH SERPL-ACNC: 1.01 MIU/L

## 2024-05-07 ENCOUNTER — ANNUAL EXAM (OUTPATIENT)
Dept: OBGYN CLINIC | Facility: MEDICAL CENTER | Age: 29
End: 2024-05-07
Payer: COMMERCIAL

## 2024-05-07 VITALS
BODY MASS INDEX: 25.4 KG/M2 | DIASTOLIC BLOOD PRESSURE: 70 MMHG | SYSTOLIC BLOOD PRESSURE: 120 MMHG | WEIGHT: 187.5 LBS | HEIGHT: 72 IN

## 2024-05-07 DIAGNOSIS — Z01.419 ENCOUNTER FOR GYNECOLOGICAL EXAMINATION: Primary | ICD-10-CM

## 2024-05-07 PROCEDURE — 99395 PREV VISIT EST AGE 18-39: CPT | Performed by: OBSTETRICS & GYNECOLOGY

## 2024-05-07 NOTE — PROGRESS NOTES
ASSESSMENT & PLAN: Ailyn Muro is a 28 y.o.  with normal gynecologic exam.    1.  Routine well woman exam done today  2.  Pap and HPV:  The patient's last pap was .    It was normal.    Pap was not done today.    Current ASCCP Guidelines reviewed.   3.  The following were reviewed in today's visit: breast self exam, family planning choices, exercise, and healthy diet.  4. Seen in ER on   - reviewed CT - normal     CC:  Annual Gynecologic Examination    HPI: Ailyn Muro is a 28 y.o.  who presents for annual gynecologic examination.  She has the following concerns:  none  now but was in ER with acute pelvic pain post  intercourse      Health Maintenance:    She wears her seatbelt routinely.    She does perform irregular monthly self breast exams.    She feels safe at home.     Past Medical History:   Diagnosis Date    Common wart     Resolved 2016     Ganglion cyst     Resolved 2016     Migraine with aura     Resolved 2016     Varicella     vaccine       Past Surgical History:   Procedure Laterality Date    DENTAL SURGERY      [place] N/A 2022    Procedure:  SECTION ();  Surgeon: Yandy Velasquez MD;  Location: [facility] ;  Service: Obstetrics       Past OB/Gyn History:  OB History          1    Para   1    Term   0       1    AB   0    Living   1         SAB   0    IAB   0    Ectopic   0    Multiple   0    Live Births   1                 Family History   Problem Relation Age of Onset    Arthritis Mother     Deep vein thrombosis Father     Colon cancer Maternal Grandmother     No Known Problems Sister     Prostate cancer Maternal Grandfather     Skin cancer Maternal Grandfather     Hypertension Maternal Grandfather     Cancer Maternal Grandfather         bladder cancer     Ovarian cancer Paternal Grandmother     Breast cancer Maternal Aunt         unsure age        Social History:  Social History     Socioeconomic History     Marital status: /Civil Union     Spouse name: Not on file    Number of children: Not on file    Years of education: Not on file    Highest education level: Not on file   Occupational History    Not on file   Tobacco Use    Smoking status: Never    Smokeless tobacco: Never   Vaping Use    Vaping status: Never Used   Substance and Sexual Activity    Alcohol use: Not Currently     Comment: social    Drug use: No    Sexual activity: Yes     Partners: Male     Birth control/protection: None   Other Topics Concern    Not on file   Social History Narrative    Not on file     Social Determinants of Health     Financial Resource Strain: Not on file   Food Insecurity: Not on file   Transportation Needs: Not on file   Physical Activity: Not on file   Stress: Not on file   Social Connections: Not on file   Intimate Partner Violence: Not on file   Housing Stability: Not on file         Allergies   Allergen Reactions    Clindamycin Throat Swelling    Sumatriptan Nausea Only and Throat Swelling    Azithromycin GI Intolerance and Vomiting    Penicillins Rash         Current Outpatient Medications:     polyethylene glycol (MIRALAX) 17 g packet, Take 17 g by mouth daily, Disp: 30 each, Rfl: 3      Review of Systems  Constitutional :no fever, feels well, no tiredness, no recent weight gain or loss  ENT: no ear ache, no loss of hearing, no nosebleeds or nasal discharge, no sore throat or hoarseness.  Cardiovascular: no complaints of slow or fast heart beat, no chest pain, no palpitations, no leg claudication or lower extremity edema.  Respiratory: no complaints of shortness of shortness of breath, no LUNA  Breasts:no complaints of breast pain, breast lump, or nipple discharge  Gastrointestinal: no complaints of abdominal pain, constipation, nausea, vomiting, or diarrhea or bloody stools  Genitourinary : no complaints of dysuria, incontinence, pelvic pain, no dysmenorrhea, vaginal discharge or abnormal vaginal bleeding and as  noted in HPI.  Musculoskeletal: no complaints of arthralgia, no myalgia, no joint swelling or stiffness, no limb pain or swelling.  Integumentary: no complaints of skin rash or lesion, itching or dry skin  Neurological: no complaints of headache, no confusion, no numbness or tingling, no dizziness or fainting    Objective      /70   Ht 6' (1.829 m)   Wt 85 kg (187 lb 8 oz)   LMP 04/17/2024 (Approximate)   BMI 25.43 kg/m²   General:   appears stated age, cooperative, alert normal mood and affect   Lungs: Unlabored breathing     Breasts: normal appearance, no masses or tenderness, Inspection negative, No nipple retraction or dimpling, No nipple discharge or bleeding, No axillary or supraclavicular adenopathy, Normal to palpation without dominant masses   Abdomen: soft, non-tender, without masses or organomegaly   Vulva: normal, normal female genitalia, Bartholin's, Urethra, [place] normal, no lesions, normal female hair distribution, no clitoral enlargement   Vagina: normal vagina, no discharge, exudate, lesion, or erythema   Urethra: normal   Cervix: Normal, no discharge. Nontender.   Uterus: normal size, contour, position, consistency, mobility, non-tender   Adnexa: no mass, fullness, tenderness   Psychiatric orientation to person, place, and time: normal. mood and affect: normal

## 2024-05-23 ENCOUNTER — OFFICE VISIT (OUTPATIENT)
Dept: GASTROENTEROLOGY | Facility: CLINIC | Age: 29
End: 2024-05-23
Payer: COMMERCIAL

## 2024-05-23 DIAGNOSIS — K59.00 CONSTIPATION, UNSPECIFIED CONSTIPATION TYPE: ICD-10-CM

## 2024-05-23 DIAGNOSIS — R14.0 BLOATING: Primary | ICD-10-CM

## 2024-05-23 DIAGNOSIS — R10.9 ABDOMINAL PAIN, UNSPECIFIED ABDOMINAL LOCATION: ICD-10-CM

## 2024-05-23 PROCEDURE — 91065 BREATH HYDROGEN/METHANE TEST: CPT | Performed by: INTERNAL MEDICINE

## 2024-05-23 NOTE — PROGRESS NOTES
[facility]       Bacterial Overgrowth Analytical Record    Ailyn Muro 28 y.o. female MRN: [id number]      Date of Test: 05/11/24    Substrate Given: Lactulose    Ordering Provider: Lamonet Gilbert PA-C    Medical Assistant: Jagruti Ibarra    Symptoms: constipation, bloating, abdominal pain    The patient presents for bacterial overgrowth testing.    Patient fasted overnight. Baseline readings obtained.   Breath test performed every 20 min for a total of 3 hr    Sample Clock Time ppmH2 ppmCH4 Co2% Chika   Baseline   7:42   3   5   3.5   1.57   #1  20 minutes   8:06   4   5   3.1   1.77   #2  40 minutes   8:26   9   7   3.1   1.77   #3  60 minutes   8:46   22   10   2.7   2.03   #4  80 minutes   9:06   21   8   3.4   1.61   #5  100 minutes   9:26   31   12   2.8   1.96   #6  120 minutes   9:46   44   11   3.1   1.77   #7  140 minutes   10:06   31   12   2.8   1.96   #8  160 minutes   10:26   22   10   2.8   1.96   #9  180 minutes   10:46   32   12   2.7   2.03       Physician interpretation: 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  Defer treatment to primary GI provider Lamonte Gilbert.

## 2024-05-24 ENCOUNTER — TELEPHONE (OUTPATIENT)
Age: 29
End: 2024-05-24

## 2024-05-24 DIAGNOSIS — R10.9 ABDOMINAL PAIN, UNSPECIFIED ABDOMINAL LOCATION: ICD-10-CM

## 2024-05-24 DIAGNOSIS — R19.4 CHANGE IN BOWEL HABITS: ICD-10-CM

## 2024-05-24 DIAGNOSIS — K63.829 INTESTINAL METHANOGEN OVERGROWTH: ICD-10-CM

## 2024-05-24 DIAGNOSIS — K63.8219 SMALL INTESTINAL BACTERIAL OVERGROWTH (SIBO): Primary | ICD-10-CM

## 2024-05-24 RX ORDER — NEOMYCIN SULFATE 500 MG/1
500 TABLET ORAL 2 TIMES DAILY
Qty: 28 TABLET | Refills: 0 | Status: SHIPPED | OUTPATIENT
Start: 2024-05-24 | End: 2024-06-07

## 2024-05-24 NOTE — TELEPHONE ENCOUNTER
PA for Xifaxan    Submitted via    [x]CMM-KEY [id number]  []Surescripts-Case ID #   []Faxed to plan   []Other website   []Phone call Case ID #    Office notes sent, clinical questions answered. Awaiting determination    Turnaround time for your insurance to make a decision on your Prior Authorization can take 7-21 business days.

## 2024-07-08 ENCOUNTER — OFFICE VISIT (OUTPATIENT)
Dept: FAMILY MEDICINE CLINIC | Facility: CLINIC | Age: 29
End: 2024-07-08
Payer: COMMERCIAL

## 2024-07-08 VITALS
OXYGEN SATURATION: 99 % | DIASTOLIC BLOOD PRESSURE: 70 MMHG | HEIGHT: 72 IN | HEART RATE: 64 BPM | TEMPERATURE: 98.9 F | BODY MASS INDEX: 24.92 KG/M2 | WEIGHT: 184 LBS | SYSTOLIC BLOOD PRESSURE: 110 MMHG

## 2024-07-08 DIAGNOSIS — M54.50 ACUTE BILATERAL LOW BACK PAIN WITHOUT SCIATICA: Primary | ICD-10-CM

## 2024-07-08 PROCEDURE — 99214 OFFICE O/P EST MOD 30 MIN: CPT

## 2024-07-08 NOTE — PROGRESS NOTES
Ambulatory Visit  Name: Ailyn Muro      : 1995      MRN: [id number]  Encounter Provider: Marcela Schultz PA-C  Encounter Date: 2024   Encounter department: [facility]    Assessment & Plan   1. Acute bilateral low back pain without sciatica  Assessment & Plan:  Patient presents today for evaluation of her lower back pain which has been present since .  She has been going to a chiropractor however his pain cash for her services.  She is requiring an insurance referral today.  I did evaluate patient myself and agree with assessment of low back pain likely a recurrence of her low back pain which comes every so often. exam today shows no neurological deficits or sensory deficits.  Patient has some limited range of motion with touching her toes, positive straight leg raise bilaterally.  Some limitation with twisting motion as well.  Recommend conservative therapy at home including muscle rubs, ibuprofen/Tylenol, and heat/ice.  Also given handout on exercises she can complete at home.  Agree with chiropractic referral, will place for her today.  If pain persists, may recommend updating an x-ray and/or physical therapy.  Patient very agreeable with plan today, was given return precautions.  Orders:  -     Ambulatory Referral to Chiropractic; Future       History of Present Illness     Patient presents today for evaluation of low back pain that has been present since .  She does have a history of on and off low back pain.  She has been going to a chiropractor and has been paying cash for her services and is requiring an insurance referral today.  She has not tried anything over-the-counter for her pain.  She does complete some exercises at home for her back pain.  Noticed pain increased with lifting her toddler up from the ground.   Location of low back pain.        Review of Systems   Musculoskeletal:  Positive for back pain. Negative for arthralgias, gait problem, joint  "swelling, myalgias, neck pain and neck stiffness.   Neurological:  Negative for tremors, weakness and numbness.       Objective     /70 (BP Location: Left arm, Patient Position: Sitting, Cuff Size: Standard)   Pulse 64   Temp 98.9 °F (37.2 °C)   Ht 6' 0.05\" (1.83 m)   Wt 83.5 kg (184 lb)   LMP 06/10/2024   SpO2 99%   BMI 24.92 kg/m²     Physical Exam  Vitals and nursing note reviewed.   Constitutional:       General: She is not in acute distress.     Appearance: Normal appearance. She is not ill-appearing.   HENT:      Head: Normocephalic and atraumatic.   Pulmonary:      Effort: Pulmonary effort is normal. No respiratory distress.   Musculoskeletal:      Lumbar back: No swelling, edema, deformity, signs of trauma, lacerations, spasms, tenderness or bony tenderness. Decreased range of motion. Positive right straight leg raise test and positive left straight leg raise test.        Back:       Comments: Location of low back pain.  Some restriction in twisting motion, especially twisting to the right.  Increased pain with forward flexion when touching toes.     Skin:     Coloration: Skin is not cyanotic or pale.   Neurological:      General: No focal deficit present.      Mental Status: She is alert and oriented to person, place, and time.   Psychiatric:         Mood and Affect: Mood normal.         Behavior: Behavior normal.         Judgment: Judgment normal.       Administrative Statements     Marcela Schultz PA-C  [facility]  "

## 2024-07-22 ENCOUNTER — OFFICE VISIT (OUTPATIENT)
Dept: GASTROENTEROLOGY | Facility: CLINIC | Age: 29
End: 2024-07-22
Payer: COMMERCIAL

## 2024-07-22 VITALS
SYSTOLIC BLOOD PRESSURE: 108 MMHG | DIASTOLIC BLOOD PRESSURE: 74 MMHG | TEMPERATURE: 98.5 F | HEIGHT: 72 IN | HEART RATE: 97 BPM | OXYGEN SATURATION: 99 % | WEIGHT: 185.2 LBS | BODY MASS INDEX: 25.09 KG/M2

## 2024-07-22 DIAGNOSIS — K59.00 CONSTIPATION, UNSPECIFIED CONSTIPATION TYPE: ICD-10-CM

## 2024-07-22 DIAGNOSIS — R14.0 ABDOMINAL BLOATING: ICD-10-CM

## 2024-07-22 DIAGNOSIS — K63.8219 SMALL INTESTINAL BACTERIAL OVERGROWTH (SIBO): Primary | ICD-10-CM

## 2024-07-22 DIAGNOSIS — K63.829 INTESTINAL METHANOGEN OVERGROWTH: ICD-10-CM

## 2024-07-22 PROCEDURE — 99213 OFFICE O/P EST LOW 20 MIN: CPT | Performed by: DIETITIAN, REGISTERED

## 2024-07-22 NOTE — PROGRESS NOTES
[facility] - Outpatient Follow-up Note  Ailyn Muro 28 y.o. female MRN: [id number]  Encounter: [id number]          ASSESSMENT AND PLAN:    1.  SIBO   2.  IMO  3.  Abdominal bloating  4.  Constipation  Patient with 10 year history of bloating, lower abdominal cramping, gas pains, and mild constipation. for the last 10+ years.  Recently this has been worsening/becoming more bothersome to her life.  Patient tested positive for SIBO and IMO on 5/11/2024.  Patient completed treatment with Xifaxan and neomycin x 14 days.  Patient is feeling significantly better since completing course of antibiotics.  After completion, her bowel habits were normal with no constipation.  In the weeks since then, some mild symptoms of constipation are recurring.  She has mild abdominal discomfort/bloating maybe one day per week (previously experienced this daily).  Sometimes has smaller BMs with straining.  She drinks a lot of water but has not yet tried to increase dietary fiber.  Family history is significant for maternal grandmother with colorectal cancer diagnosed in her 40s.     -Continue with excellent water intake >80 oz daily.  -Slowly increase fiber intake as able, to goal of 20 gm daily.  Advised patient increasing fiber too fast can contribute to bloating.  Provided patient with FODMAP food list handout and advised to choose primarily non-fermentable sources of fiber to limit symptoms of bloating and abdominal pain.  -If no improvement with dietary changes, start MiraLAX 1/2 capful daily.  -Can consider re-treatment with Xifaxan/Neomycin in the future if needed.  -Advised patient to call the office or send a DIIME message with any questions/concerns or any new/worsening symptoms.      Follow up 6 months.    __________________________________________________________    SUBJECTIVE:  Ailyn Muro is a 28 y.o. female who presents for follow up of bloating and constipation.  Last office visit  2024.  Celiac serology, TSH, and CRP normal 2024.  Breath testing positive for SIBO and IMO on 2024.  Patient completed treatment with Xifaxan and neomycin x 14 days.    Patient is feeling significantly better since completing course of antibiotics.  After completion, her bowel habits were normal with no constipation.  In the weeks since then, some mild symptoms of constipation are recurring.  She has mild abdominal discomfort/bloating maybe one day per week (previously experienced this daily).  Sometimes has smaller BMs with straining.  She drinks a lot of water but has not yet tried to increase dietary fiber.      REVIEW OF SYSTEMS:  10 point ROS reviewed and negative, except as above      Historical Information   Past Medical History:   Diagnosis Date   • Common wart     Resolved 2016    • Ganglion cyst     Resolved 2016    • Migraine with aura     Resolved 2016    • Varicella     vaccine     Past Surgical History:   Procedure Laterality Date   • DENTAL SURGERY     • ME  DELIVERY ONLY N/A 2022    Procedure:  SECTION ();  Surgeon: Yandy Velasquez MD;  Location: [facility] ;  Service: Obstetrics     Social History   Social History     Substance and Sexual Activity   Alcohol Use Not Currently    Comment: social     Social History     Substance and Sexual Activity   Drug Use No     Social History     Tobacco Use   Smoking Status Never   Smokeless Tobacco Never     Family History   Problem Relation Age of Onset   • Arthritis Mother    • Deep vein thrombosis Father    • Colon cancer Maternal Grandmother    • No Known Problems Sister    • Prostate cancer Maternal Grandfather    • Skin cancer Maternal Grandfather    • Hypertension Maternal Grandfather    • Cancer Maternal Grandfather         bladder cancer    • Ovarian cancer Paternal Grandmother    • Breast cancer Maternal Aunt         unsure age        Meds/Allergies     No current outpatient medications on  file.    Allergies   Allergen Reactions   • Clindamycin Throat Swelling   • Sumatriptan Nausea Only and Throat Swelling   • Azithromycin GI Intolerance and Vomiting   • Penicillins Rash           Objective     Wt Readings from Last 3 Encounters:   07/22/24 84 kg (185 lb 3.2 oz)   07/08/24 83.5 kg (184 lb)   05/07/24 85 kg (187 lb 8 oz)     Temp Readings from Last 3 Encounters:   07/22/24 98.5 °F (36.9 °C) (Tympanic)   07/08/24 98.9 °F (37.2 °C)   04/22/24 98.6 °F (37 °C) (Tympanic)     BP Readings from Last 3 Encounters:   07/22/24 108/74   07/08/24 110/70   05/07/24 120/70     Pulse Readings from Last 3 Encounters:   07/22/24 97   07/08/24 64   04/22/24 61          PHYSICAL EXAM:      Physical Exam  Vitals reviewed.   Constitutional:       General: She is not in acute distress.     Appearance: She is well-developed.   HENT:      Head: Normocephalic and atraumatic.   Eyes:      Conjunctiva/sclera: Conjunctivae normal.   Cardiovascular:      Rate and Rhythm: Normal rate and regular rhythm.      Heart sounds: No murmur heard.  Pulmonary:      Effort: Pulmonary effort is normal. No respiratory distress.      Breath sounds: Normal breath sounds.   Abdominal:      General: Bowel sounds are normal. There is no distension.      Palpations: Abdomen is soft.      Tenderness: There is no abdominal tenderness. There is no guarding.   Musculoskeletal:         General: No swelling.      Cervical back: Neck supple.   Skin:     General: Skin is warm and dry.   Neurological:      Mental Status: She is alert.   Psychiatric:         Mood and Affect: Mood normal.          Lab Results:   No visits with results within 1 Day(s) from this visit.   Latest known visit with results is:   Office Visit on 04/22/2024   Component Date Value   • TSH W/RFX TO FREE T4 04/23/2024 1.01    • C-Reactive Protein, Quant 04/23/2024 <3.0        Lab Results   Component Value Date    WBC 11.43 (H) 04/07/2024    HGB 12.0 04/07/2024    HCT 39.4 04/07/2024     MCV 97 04/07/2024     04/07/2024       Lab Results   Component Value Date    SODIUM 137 04/07/2024    K 3.6 04/07/2024     04/07/2024    CO2 24 04/07/2024    AGAP 6 04/07/2024    BUN 14 04/07/2024    CREATININE 0.66 04/07/2024    GLUC 110 04/07/2024    CALCIUM 8.4 04/07/2024    AST 14 04/07/2024    ALT 13 04/07/2024    ALKPHOS 51 04/07/2024    TP 6.4 04/07/2024    TBILI 0.44 04/07/2024    EGFR 121 04/07/2024         Radiology Results:   No results found.

## 2024-08-14 ENCOUNTER — ULTRASOUND (OUTPATIENT)
Dept: OBGYN CLINIC | Facility: CLINIC | Age: 29
End: 2024-08-14
Payer: COMMERCIAL

## 2024-08-14 VITALS
BODY MASS INDEX: 25.06 KG/M2 | SYSTOLIC BLOOD PRESSURE: 110 MMHG | DIASTOLIC BLOOD PRESSURE: 72 MMHG | HEIGHT: 72 IN | WEIGHT: 185 LBS

## 2024-08-14 DIAGNOSIS — O26.841 UTERINE SIZE-DATE DISCREPANCY IN FIRST TRIMESTER: ICD-10-CM

## 2024-08-14 DIAGNOSIS — N92.6 MISSED MENSES: Primary | ICD-10-CM

## 2024-08-14 PROCEDURE — 76817 TRANSVAGINAL US OBSTETRIC: CPT | Performed by: OBSTETRICS & GYNECOLOGY

## 2024-08-14 PROCEDURE — 99214 OFFICE O/P EST MOD 30 MIN: CPT | Performed by: OBSTETRICS & GYNECOLOGY

## 2024-08-14 NOTE — PROGRESS NOTES
Assessment /Plan   Ailyn was seen today for dating and viability .    Diagnoses and all orders for this visit:    Missed menses  -     AMB US OB < 14 weeks single or first gestation level 1    Uterine size-date discrepancy in first trimester  -     US pelvis complete w transvaginal; Future  -     AMB US OB < 14 weeks single or first gestation level 1    Live  IUP    Based on LMP would be  8weeks  6 days (sure of  LMP 2024) - EDC  2025   Based on US today measuring  7weeks 4 days  -EDC 2025    Given discrepancy on US derived  EDC and  LMP derived EDC   We discussed repeating US in 7 days to confirm viability and  growth - aware if appropriate growth next week  and concordant with US today then EDC is changed to 2025.   Verbalized understanding and agreement with plan of care  No available appts for next week therefore US  ordered through radiology     Hx of prior classical C/S at 29 weeks for  PPROM -  aware would need early repeat  C/S in future pregnancy            Subjective   Ailyn Muro is a 29 y.o. female here for a problem visit.  Patient is complaining of missed menses . She is sure of LMP  2024 . She was attempting to conceive . Having some cramps ,tiredness and food aversions but  no pain or  bleeding .    Patient Active Problem List   Diagnosis    Mass of right axilla    Atypical nevi    Delivery by classical  section    Family history of deep vein thrombosis    Anxiety    Migraine    Accessory breast tissue of axilla    Postpartum care and examination    Chronic bilateral low back pain without sciatica    Acute bilateral low back pain without sciatica       Gynecologic History  Patient's last menstrual period was 2024 (exact date).  The current method of family planning is none.    Past Medical History:   Diagnosis Date    Common wart     Resolved 2016     Ganglion cyst     Resolved 2016     Migraine with aura     Resolved 2016      Varicella     vaccine     Past Surgical History:   Procedure Laterality Date    DENTAL SURGERY      ID  DELIVERY ONLY N/A 2022    Procedure:  SECTION ();  Surgeon: Yandy Velasquez MD;  Location: [facility] ;  Service: Obstetrics     Family History   Problem Relation Age of Onset    Arthritis Mother     Deep vein thrombosis Father     Colon cancer Maternal Grandmother     No Known Problems Sister     Prostate cancer Maternal Grandfather     Skin cancer Maternal Grandfather     Hypertension Maternal Grandfather     Cancer Maternal Grandfather         bladder cancer     Ovarian cancer Paternal Grandmother     Breast cancer Maternal Aunt         unsure age      Social History     Socioeconomic History    Marital status: /Civil Union     Spouse name: Not on file    Number of children: Not on file    Years of education: Not on file    Highest education level: Not on file   Occupational History    Not on file   Tobacco Use    Smoking status: Never    Smokeless tobacco: Never   Vaping Use    Vaping status: Never Used   Substance and Sexual Activity    Alcohol use: Not Currently     Comment: social    Drug use: No    Sexual activity: Yes     Partners: Male     Birth control/protection: None   Other Topics Concern    Not on file   Social History Narrative    Not on file     Social Determinants of Health     Financial Resource Strain: Not on file   Food Insecurity: Not on file   Transportation Needs: Not on file   Physical Activity: Not on file   Stress: Not on file   Social Connections: Not on file   Intimate Partner Violence: Not on file   Housing Stability: Not on file     Allergies   Allergen Reactions    Clindamycin Throat Swelling    Sumatriptan Nausea Only and Throat Swelling    Azithromycin GI Intolerance and Vomiting    Penicillins Rash     No current outpatient medications on file.    Review of Systems  Constitutional :no fever, feels well, no tiredness, no recent weight gain or  loss  ENT: no ear ache, no loss of hearing, no nosebleeds or nasal discharge, no sore throat or hoarseness.  Cardiovascular: no complaints of slow or fast heart beat, no chest pain, no palpitations, no leg claudication or lower extremity edema.  Respiratory: no complaints of shortness of shortness of breath, no LUNA  Breasts:no complaints of breast pain, breast lump, or nipple discharge  Gastrointestinal: no complaints of abdominal pain, constipation, nausea, vomiting, or diarrhea or bloody stools  Genitourinary : no complaints of dysuria, incontinence, pelvic pain, no dysmenorrhea, vaginal discharge or abnormal vaginal bleeding and as noted in HPI.  Musculoskeletal: no complaints of arthralgia, no myalgia, no joint swelling or stiffness, no limb pain or swelling.  Integumentary: no complaints of skin rash or lesion, itching or dry skin  Neurological: no complaints of headache, no confusion, no numbness or tingling, no dizziness or fainting     Objective     /72   Ht 6' (1.829 m)   Wt 83.9 kg (185 lb)   LMP 06/14/2024 (Exact Date)   BMI 25.09 kg/m²     General:   appears stated age, cooperative, alert normal mood and affect   Lungs: Unlabored     Abdomen: soft, non-tender, without masses or organomegaly   Vulva: normal   Psychiatric orientation to person, place, and time: normal. mood and affect: normal

## 2024-08-21 ENCOUNTER — HOSPITAL ENCOUNTER (OUTPATIENT)
Dept: ULTRASOUND IMAGING | Facility: HOSPITAL | Age: 29
Discharge: HOME | End: 2024-08-21
Attending: OBSTETRICS & GYNECOLOGY
Payer: COMMERCIAL

## 2024-08-21 DIAGNOSIS — O26.841 UTERINE SIZE-DATE DISCREPANCY IN FIRST TRIMESTER: ICD-10-CM

## 2024-08-21 PROCEDURE — 76801 OB US < 14 WKS SINGLE FETUS: CPT

## 2024-08-23 NOTE — RESULT ENCOUNTER NOTE
Please inform patient I reviewed her US . It shows growth in accordance to her LMP therefore my dating US  was not appropriate possibly do to fetal position  I tried calling her  but  went to voice    Please let her  know we will be dating  her based on her  LMP  - which means  her due date is  3/21/2024

## 2024-08-26 ENCOUNTER — PATIENT MESSAGE (OUTPATIENT)
Dept: OBGYN CLINIC | Facility: MEDICAL CENTER | Age: 29
End: 2024-08-26

## 2024-08-26 ENCOUNTER — NURSE TRIAGE (OUTPATIENT)
Age: 29
End: 2024-08-26

## 2024-08-26 NOTE — TELEPHONE ENCOUNTER
"Ailyn noted red vaginal bleeding in toilet and small amount on toilet paper with wiping last night after attempting to have BM. This morning attempted to have BM with larger amount of vaginal bleeding noted with wiping- appeared more like period type blood... Noted pelvic cramping/back pain earlier today... Has been able to have BM this afternoon and cramping/back pain resolved.  Has had no further bleeding with wiping remainder of today.     Last BM prior to today 3 days ago.  Now on regimen of colace, has increased water intake. NO recent intercourse    Reports certain bleeding was vaginal. Blood type   B+    ESC to on call Dr. Hadley at 5:32pm  No reply by 5:38.   ESC to DR. Evans to contact patient directly to provide additional recommendations if indicated.   CTS no longer available.      Patient advised to  continue to monitor for any additional bleeding or pelvic pain and call back for any new concern.Reviewed tips to reduce constipation/straining. Provided pregnancy essential guide link. Aware will forward message to Dr. Hadley to contact her directly if additional recommendations are indicated.    Patient in agreement to plan.         [facility]'s Pregnancy Essentials Guide  [facility] ([email or web address])           Reason for Disposition   SPOTTING (single or brief episode)    Answer Assessment - Initial Assessment Questions  1. ONSET: \"When did this bleeding start?\"        Last night  2. DESCRIPTION: \"Describe the bleeding that you are having.\" \"How much bleeding is there?\"     - SPOTTING: spotting, or pinkish / brownish mucous discharge; does not fill panti-liner or pad     - MILD:  less than 1 pad / hour; less than patient's usual menstrual bleeding    - MODERATE: 1-2 pads / hour; 1 menstrual cup every 6 hours; small-medium blood clots (e.g., pea, grape, small coin)    - SEVERE: soaking 2 or more pads/hour for 2 or more hours; 1 menstrual cup every 2 hours; bleeding not contained by pads or " "continuous red blood from vagina; large blood clots (e.g., golf ball, large coin)       Red vaginal bleeding last night after attempting to have BM- a few spots. Red vaginal bleeding larger amount with  wiping on toilet paper, reminding of period type bleeding again this morning after attempting to have BM. No further bleeding  3. ABDOMINAL PAIN SEVERITY: If present, ask: \"How bad is it?\"  (e.g., Scale 1-10; mild, moderate, or severe)    - MILD (1-3): doesn't interfere with normal activities, abdomen soft and not tender to touch     - MODERATE (4-7): interferes with normal activities or awakens from sleep, tender to touch     - SEVERE (8-10): excruciating pain, doubled over, unable to do any normal activities      Lower abdominal cramping/low back pain. Hard to tell if due to pelvic cramps  4. PREGNANCY: \"Do you know how many weeks or months pregnant you are?\" \"When was the first day of your last normal menstrual period?\"      10w3d  per LMP  5. HEMODYNAMIC STATUS: \"Are you weak or feeling lightheaded?\" If Yes, ask: \"Can you stand and walk normally?\"       denies  6. OTHER SYMPTOMS: \"What other symptoms are you having with the bleeding?\" (e.g., passed tissue, vaginal discharge, fever, menstrual-type cramps)      Reports constipation in the past 3 days, successful BM this afternoon. Pelvic cramping has resolved since BM with no further bleeding noted.    Protocols used: Pregnancy - Vaginal Bleeding Less Than 20 Weeks EGA-ADULT-[place]    "

## 2024-08-26 NOTE — TELEPHONE ENCOUNTER
Regarding: bright red bleeding when using bathrom/ early pregnancy  ----- Message from Ailyn GARCIA sent at 8/26/2024  4:55 PM EDT -----  Pt sent a mychart message that she is having red vaginal bleeding when using the bathroom. It does not fall on her underwear. Early pregnancy.

## 2024-08-29 NOTE — PROGRESS NOTES
OB INTAKE INTERVIEW 2024    Patient is 29 y.o. who presents for OB intake at 11 weeks.  She is not accompanied by anyone during this encounter.  The father of her baby (Awais) is involved in the pregnancy.      Patient's last menstrual period was 2024 (exact date).  Ultrasound: Measured 9 weeks 4 days on 24  Estimated Date of Delivery: 25 confirmed by dating ultrasound.    Signs/Symptoms of Pregnancy  Current pregnancy symptoms: breast tenderness, fatigue, and nausea  Constipation YES  Headaches no  Cramping/spotting no  PICA cravings no    Diabetes-  Body mass index is 24.41 kg/m².  If patient has 1 or more, please order early 1 hour GTT  History of GDM no  BMI >35 no  History of PCOS or current metformin use no  History of LGA/macrosomic infant (4000g/9lbs) no    If patient has 2 or more, please order early 1 hour GTT  BMI>30 no  AMA no  First degree relative with type 2 diabetes no  History of chronic HTN, hyperlipidemia, elevated A1C no  High risk race (, , ,  or ) no    Hypertension- if you answer yes to any of the following, please order baseline preeclampsia labs (cbc, comprehensive metabolic panel, urine protein creatinine ratio, uric acid)  History of of chronic HTN no  History of gestational HTN no  History of preeclampsia, eclampsia, or HELLP syndrome no  History of diabetes no  History of lupus, autoimmune disease, kidney disease no    Thyroid- if yes order TSH with reflex T4  History of thyroid disease no    Bleeding Disorder or Hx of DVT-patient or first degree relative with history of. Order the following if not done previously.   (Factor V, antithrombin III, prothrombin gene mutation, protein C and S Ag, lupus anticoagulant, anticardiolipin, beta-2 glycoprotein)   YES - Father with h/o DVT- blood work completed in 2022.    OB/GYN-  History of abnormal pap smear no       Date of last pap smear  2023  History of HPV no  History of Herpes/HSV no  History of other STI (gonorrhea, chlamydia, trich) no  History of prior  no  History of prior  YES  History of  delivery prior to 36 weeks 6 days YES  History of blood transfusion no  Ok for blood transfusion YES    Substance screening-   History of tobacco use no  Currently using tobacco no  Currently using alcohol no  Presently using drugs no  Past drug use  no  IV drug use- no  Partner drug use no  Parent/Family drug use no  Substance Use Screen Level - no risk    MRSA Screening-   Does the pt have a hx of MRSA? no    Immunizations:  Discussed Tdap vaccine YES   COVID Vaccine NO     Genetic/[facility]-  Do you or your partner have a history of any of the following in yourselves or first degree relatives?  Cystic fibrosis no  Spinal muscular atrophy no  Hemoglobinopathy/Sickle Cell/Thalassemia no  Fragile X Intellectual Disability no    Patient does desire testing for Cystic Fibrosis and Spinal Muscular Atrophy.  Orders placed.    Appointment for Nuchal Translucency Ultrasound at [facility] has not been scheduled.  Referral provided today.    Interview education  [facility] Pregnancy Essentials Book reviewed, discussed and attached to their AVS YES     Prenatal lab work scripts YES    Extra labs ordered: Cystic Fibrosis gene test and Spinal muscular atrophy DNA    Aspirin/Preeclampsia Screen    Risk Level Risk Factor Recommendation   LOW Prior Uncomplicated full-term delivery no No Aspirin recommendation        MODERATE Nulliparity no Recommend low-dose aspirin if     BMI>30 no 2 or more moderate risk factors    Family History Preeclampsia (mother/sister) no     35yr old or greater no      or Low Socioeconomic no     IVF Pregnancy  no     Personal History Risks (low birth weight, prior adverse preg outcome, >10yr preg interval) YES - Classical C/S in 2022 at 29 weeks         HIGH History of Preeclampsia no Recommend low-dose aspirin  if     Multifetal gestation no 1 or more high risk factors    Chronic HTN no     Type 1 or 2 Diabetes no     Renal Disease no     Autoimmune Disease  no      Contraindications to ASA therapy:  NSAID/ ASA allergy: no  Nasal polyps: no  Asthma with history of ASA induced bronchospasm: no  Relative contraindications:  History of GI bleed: no  Active peptic ulcer disease: no  Severe hepatic dysfunction: no    Patient does not meet recommendation to take ASA 162mg during this pregnancy from 12-36wks to lower her risk of preeclampsia.     The patient has a history now or in prior pregnancy notable for:  PPROM with delivery at 29 weeks. Classical C/S 7/2022    Details that I feel the provider should be aware of: Ailyn came in for her OB intake at 11 weeks. Patient c/o breast tenderness, fatigue, nausea and constipation. Patient had episodes of spotting with BM due to moderate constipation. Spotting resolved. Care advise given regarding constipation. Patient is also seeing GI 9/9. Safe medications to take during pregnancy and warning signs reviewed. Patient with h/o classical C/S in 2022-PPROM. Prenatal panel ordered. MFM referral provided today. Patient will call to schedule appointment today.     PN1 visit scheduled. The patient was oriented to our practice, the navigator role, reviewed delivering physicians and [facility] for delivery. All questions were answered.    Interviewed by: José Flores RN

## 2024-08-30 ENCOUNTER — PATIENT MESSAGE (OUTPATIENT)
Dept: OBGYN CLINIC | Facility: MEDICAL CENTER | Age: 29
End: 2024-08-30

## 2024-08-30 ENCOUNTER — INITIAL PRENATAL (OUTPATIENT)
Dept: OBGYN CLINIC | Facility: MEDICAL CENTER | Age: 29
End: 2024-08-30

## 2024-08-30 VITALS — DIASTOLIC BLOOD PRESSURE: 74 MMHG | SYSTOLIC BLOOD PRESSURE: 102 MMHG | WEIGHT: 180 LBS | BODY MASS INDEX: 24.41 KG/M2

## 2024-08-30 DIAGNOSIS — Z34.81 PRENATAL CARE, SUBSEQUENT PREGNANCY, FIRST TRIMESTER: Primary | ICD-10-CM

## 2024-08-30 DIAGNOSIS — Z31.430 ENCOUNTER OF FEMALE FOR TESTING FOR GENETIC DISEASE CARRIER STATUS FOR PROCREATIVE MANAGEMENT: ICD-10-CM

## 2024-08-30 PROCEDURE — OBC

## 2024-08-30 RX ORDER — DOCUSATE SODIUM 100 MG/1
100 CAPSULE, LIQUID FILLED ORAL 2 TIMES DAILY
COMMUNITY

## 2024-09-01 LAB
ABO GROUP BLD: NORMAL
APPEARANCE UR: ABNORMAL
BACTERIA UR QL AUTO: ABNORMAL /HPF
BASOPHILS # BLD AUTO: 22 CELLS/UL (ref 0–200)
BASOPHILS NFR BLD AUTO: 0.3 %
BILIRUB UR QL STRIP: NEGATIVE
BLD GP AB SCN SERPL QL: NORMAL
COLOR UR: YELLOW
EOSINOPHIL # BLD AUTO: 50 CELLS/UL (ref 15–500)
EOSINOPHIL NFR BLD AUTO: 0.7 %
ERYTHROCYTE [DISTWIDTH] IN BLOOD BY AUTOMATED COUNT: 12.3 % (ref 11–15)
GLUCOSE UR QL STRIP: NEGATIVE
HBV SURFACE AB SERPL IA-ACNC: 60 MIU/ML
HBV SURFACE AG SERPL QL IA: NORMAL
HCT VFR BLD AUTO: 36.4 % (ref 35–45)
HCV AB SERPL QL IA: NORMAL
HGB BLD-MCNC: 12.1 G/DL (ref 11.7–15.5)
HGB UR QL STRIP: NEGATIVE
HIV 1+2 AB+HIV1 P24 AG SERPL QL IA: NORMAL
HYALINE CASTS #/AREA URNS LPF: ABNORMAL /LPF
KETONES UR QL STRIP: NEGATIVE
LEUKOCYTE ESTERASE UR QL STRIP: ABNORMAL
LYMPHOCYTES # BLD AUTO: 2282 CELLS/UL (ref 850–3900)
LYMPHOCYTES NFR BLD AUTO: 31.7 %
MCH RBC QN AUTO: 30.6 PG (ref 27–33)
MCHC RBC AUTO-ENTMCNC: 33.2 G/DL (ref 32–36)
MCV RBC AUTO: 91.9 FL (ref 80–100)
MONOCYTES # BLD AUTO: 439 CELLS/UL (ref 200–950)
MONOCYTES NFR BLD AUTO: 6.1 %
NEUTROPHILS # BLD AUTO: 4406 CELLS/UL (ref 1500–7800)
NEUTROPHILS NFR BLD AUTO: 61.2 %
NITRITE UR QL STRIP: NEGATIVE
PH UR STRIP: 5.5 [PH] (ref 5–8)
PLATELET # BLD AUTO: 214 THOUSAND/UL (ref 140–400)
PMV BLD REES-ECKER: 11 FL (ref 7.5–12.5)
PROT UR QL STRIP: NEGATIVE
RBC # BLD AUTO: 3.96 MILLION/UL (ref 3.8–5.1)
RBC #/AREA URNS HPF: ABNORMAL /HPF
RH BLD: NORMAL
RPR SER QL: NORMAL
SP GR UR STRIP: 1.01 (ref 1–1.03)
SQUAMOUS #/AREA URNS HPF: ABNORMAL /HPF
WBC # BLD AUTO: 7.2 THOUSAND/UL (ref 3.8–10.8)
WBC #/AREA URNS HPF: ABNORMAL /HPF

## 2024-09-03 LAB
ABO GROUP BLD: NORMAL
APPEARANCE UR: ABNORMAL
BACTERIA UR QL AUTO: ABNORMAL /HPF
BASOPHILS # BLD AUTO: 22 CELLS/UL (ref 0–200)
BASOPHILS NFR BLD AUTO: 0.3 %
BILIRUB UR QL STRIP: NEGATIVE
BLD GP AB SCN SERPL QL: NORMAL
COLOR UR: YELLOW
EOSINOPHIL # BLD AUTO: 50 CELLS/UL (ref 15–500)
EOSINOPHIL NFR BLD AUTO: 0.7 %
ERYTHROCYTE [DISTWIDTH] IN BLOOD BY AUTOMATED COUNT: 12.3 % (ref 11–15)
GLUCOSE UR QL STRIP: NEGATIVE
HBV SURFACE AB SERPL IA-ACNC: 60 MIU/ML
HBV SURFACE AG SERPL QL IA: NORMAL
HCT VFR BLD AUTO: 36.4 % (ref 35–45)
HCV AB SERPL QL IA: NORMAL
HGB BLD-MCNC: 12.1 G/DL (ref 11.7–15.5)
HGB UR QL STRIP: NEGATIVE
HIV 1+2 AB+HIV1 P24 AG SERPL QL IA: NORMAL
HYALINE CASTS #/AREA URNS LPF: ABNORMAL /LPF
KETONES UR QL STRIP: NEGATIVE
LEUKOCYTE ESTERASE UR QL STRIP: ABNORMAL
LYMPHOCYTES # BLD AUTO: 2282 CELLS/UL (ref 850–3900)
LYMPHOCYTES NFR BLD AUTO: 31.7 %
MCH RBC QN AUTO: 30.6 PG (ref 27–33)
MCHC RBC AUTO-ENTMCNC: 33.2 G/DL (ref 32–36)
MCV RBC AUTO: 91.9 FL (ref 80–100)
MONOCYTES # BLD AUTO: 439 CELLS/UL (ref 200–950)
MONOCYTES NFR BLD AUTO: 6.1 %
NEUTROPHILS # BLD AUTO: 4406 CELLS/UL (ref 1500–7800)
NEUTROPHILS NFR BLD AUTO: 61.2 %
NITRITE UR QL STRIP: NEGATIVE
PH UR STRIP: 5.5 [PH] (ref 5–8)
PLATELET # BLD AUTO: 214 THOUSAND/UL (ref 140–400)
PMV BLD REES-ECKER: 11 FL (ref 7.5–12.5)
PROT UR QL STRIP: NEGATIVE
RBC # BLD AUTO: 3.96 MILLION/UL (ref 3.8–5.1)
RBC #/AREA URNS HPF: ABNORMAL /HPF
RH BLD: NORMAL
RPR SER QL: NORMAL
RUBV IGG SERPL IA-ACNC: 4.21 INDEX
SP GR UR STRIP: 1.01 (ref 1–1.03)
SQUAMOUS #/AREA URNS HPF: ABNORMAL /HPF
WBC # BLD AUTO: 7.2 THOUSAND/UL (ref 3.8–10.8)
WBC #/AREA URNS HPF: ABNORMAL /HPF

## 2024-09-04 ENCOUNTER — TELEPHONE (OUTPATIENT)
Dept: OBGYN CLINIC | Facility: MEDICAL CENTER | Age: 29
End: 2024-09-04

## 2024-09-04 NOTE — TELEPHONE ENCOUNTER
----- Message from José LEUNG sent at 8/30/2024  3:54 PM EDT -----  Regarding: OB Visits  Hello!    Please assist patient with scheduling further OB visits. She comes back from vacation on 10/16 and would like to come in 10/17 or 10/18 after 2:45 PM. Thank you.

## 2024-09-09 ENCOUNTER — OFFICE VISIT (OUTPATIENT)
Dept: GASTROENTEROLOGY | Facility: CLINIC | Age: 29
End: 2024-09-09
Payer: COMMERCIAL

## 2024-09-09 VITALS
HEART RATE: 93 BPM | OXYGEN SATURATION: 99 % | DIASTOLIC BLOOD PRESSURE: 74 MMHG | TEMPERATURE: 98.9 F | HEIGHT: 72 IN | BODY MASS INDEX: 24.71 KG/M2 | SYSTOLIC BLOOD PRESSURE: 106 MMHG | WEIGHT: 182.4 LBS

## 2024-09-09 DIAGNOSIS — K59.00 CONSTIPATION, UNSPECIFIED CONSTIPATION TYPE: ICD-10-CM

## 2024-09-09 DIAGNOSIS — R19.5 MUCUS IN STOOL: ICD-10-CM

## 2024-09-09 DIAGNOSIS — K62.5 RECTAL BLEEDING: ICD-10-CM

## 2024-09-09 DIAGNOSIS — R19.7 DIARRHEA, UNSPECIFIED TYPE: Primary | ICD-10-CM

## 2024-09-09 PROCEDURE — 99213 OFFICE O/P EST LOW 20 MIN: CPT | Performed by: DIETITIAN, REGISTERED

## 2024-09-09 NOTE — PROGRESS NOTES
[facility] - Outpatient Follow-up Note  Ailyn Muro 29 y.o. female MRN: [id number]  Encounter: [id number]          ASSESSMENT AND PLAN:    1.  Diarrhea   2.  Constipation  3.  Mucus in stool  4.  Rectal bleeding  Patient with history of bloating, lower abdominal cramping, gas pains, and mild constipation for the last 10+ years with recent worsening.  Patient tested positive for SIBO and IMO on 5/11/2024.  Patient completed treatment with Xifaxan and neomycin x 14 days.  Patient had significant symptom improvement after antibiotics for several weeks.  In the last month, patient has had recurrence of GI symptoms with constipation and alternating diarrhea, but now also experiencing fecal urgency, mucus in the stool, and blood in the stool.  Blood was seen in mid-August and has not recurred since.  She is currently ~12 weeks pregnant.  She drinks a lot of water daily.  Family history is significant for maternal grandmother with colorectal cancer diagnosed in her 40s.  No prior colonoscopy.     -Given patient is currently pregnant, will defer endoscopic evaluation.  Check fecal calprotectin and infectious stool tests.  -Start MiraLAX 1 capful daily.  Advised patient she can adjust dosing up or down as needed.  -Continue with excellent water intake >80 oz daily.  -Advised patient to call the office or send a [employer] message with any questions/concerns or any new/worsening symptoms.      Follow up 2 months.    __________________________________________________________    SUBJECTIVE:  Ailyn Muro is a 29 y.o. female who presents for follow up.  Last office visit 7/2024.    Patient reports starting in early August, she had recurrence of GI symptoms with constipation and alternating diarrhea, but now also experiencing fecal urgency, mucus in the stool, and blood in the stool.  Blood was seen in mid-August and has not recurred since.  She goes 3-4 days without a BM and then experiences  diarrhea.  Previously tried using Colace but then was nervous this was worsening diarrhea so she stopped.  She is currently ~12 weeks pregnant and dealing with first trimester nausea.  She drinks a lot of water daily.  No prior colonoscopy.      REVIEW OF SYSTEMS:  10 point ROS reviewed and negative, except as above      Historical Information   Past Medical History:   Diagnosis Date   • Common wart     Resolved 2016    • Ganglion cyst     Resolved 2016    • Migraine with aura     Resolved 2016    • Varicella     vaccine     Past Surgical History:   Procedure Laterality Date   • DENTAL SURGERY     • [place]  DELIVERY ONLY N/A 2022    Procedure:  SECTION ();  Surgeon: Yandy Velasquez MD;  Location: [facility] ;  Service: Obstetrics     Social History   Social History     Substance and Sexual Activity   Alcohol Use Not Currently    Comment: social     Social History     Substance and Sexual Activity   Drug Use No     Social History     Tobacco Use   Smoking Status Never   Smokeless Tobacco Never     Family History   Problem Relation Age of Onset   • Arthritis Mother    • Deep vein thrombosis Father    • No Known Problems Sister    • Colon cancer Maternal Grandmother    • Prostate cancer Maternal Grandfather    • Skin cancer Maternal Grandfather    • Hypertension Maternal Grandfather    • Cancer Maternal Grandfather         bladder cancer    • Ovarian cancer Paternal Grandmother    • No Known Problems Paternal Grandfather    • Breast cancer Maternal Aunt         unsure age        Meds/Allergies       Current Outpatient Medications:   •  docusate sodium (COLACE) 100 mg capsule  •  Prenatal Vit-Fe Fumarate-FA (PRENATAL VITAMIN PO)    Allergies   Allergen Reactions   • Clindamycin Throat Swelling   • Sumatriptan Nausea Only and Throat Swelling   • Azithromycin GI Intolerance and Vomiting   • Penicillins Rash           Objective     Wt Readings from Last 3 Encounters:   24 82.7  kg (182 lb 6.4 oz)   08/30/24 81.6 kg (180 lb)   08/14/24 83.9 kg (185 lb)     Temp Readings from Last 3 Encounters:   09/09/24 98.9 °F (37.2 °C) (Tympanic)   07/22/24 98.5 °F (36.9 °C) (Tympanic)   07/08/24 98.9 °F (37.2 °C)     BP Readings from Last 3 Encounters:   09/09/24 106/74   08/30/24 102/74   08/14/24 110/72     Pulse Readings from Last 3 Encounters:   09/09/24 93   07/22/24 97   07/08/24 64          PHYSICAL EXAM:      Physical Exam  Vitals reviewed.   Constitutional:       General: She is not in acute distress.     Appearance: She is well-developed.   HENT:      Head: Normocephalic and atraumatic.   Eyes:      Conjunctiva/sclera: Conjunctivae normal.   Cardiovascular:      Rate and Rhythm: Normal rate and regular rhythm.      Heart sounds: No murmur heard.  Pulmonary:      Effort: Pulmonary effort is normal. No respiratory distress.      Breath sounds: Normal breath sounds.   Abdominal:      General: Bowel sounds are normal. There is no distension.      Palpations: Abdomen is soft.      Tenderness: There is no abdominal tenderness. There is no guarding.   Musculoskeletal:         General: No swelling.      Cervical back: Neck supple.   Skin:     General: Skin is warm and dry.   Neurological:      Mental Status: She is alert.   Psychiatric:         Mood and Affect: Mood normal.          Lab Results:   No visits with results within 1 Day(s) from this visit.   Latest known visit with results is:   Initial Prenatal on 08/30/2024   Component Date Value   • Urine Culture Result 08/31/2024     • White Blood Cell Count 08/31/2024 7.2    • Red Blood Cell Count 08/31/2024 3.96    • Hemoglobin 08/31/2024 12.1    • HCT 08/31/2024 36.4    • MCV 08/31/2024 91.9    • MCH 08/31/2024 30.6    • MCHC 08/31/2024 33.2    • RDW 08/31/2024 12.3    • Platelet Count 08/31/2024 214    • SL AMB MPV 08/31/2024 11.0    • Neutrophils (Absolute) 08/31/2024 4,406    • Lymphocytes (Absolute) 08/31/2024 2,282    • Monocytes (Absolute)  08/31/2024 439    • Eosinophils (Absolute) 08/31/2024 50    • Basophils ABS 08/31/2024 22    • Neutrophils 08/31/2024 61.2    • Lymphocytes 08/31/2024 31.7    • Monocytes 08/31/2024 6.1    • Eosinophils 08/31/2024 0.7    • Basophils PCT 08/31/2024 0.3    • SL AMB ANTIBODY SCREEN, * 08/31/2024 NO ANTIBODIES DETECTED    • ABO Grouping 08/31/2024 B    • Rh Type 08/31/2024 RH(D) POSITIVE    • RPR 08/31/2024 NON-REACTIVE    • HBsAg Screen 08/31/2024 NON-REACTIVE    • Rubella IgG Scr 08/31/2024 4.21    • HIV AG/AB, 4th Gen 08/31/2024 NON-REACTIVE    • HEP C AB 08/31/2024 NON-REACTIVE    • Color UA 08/31/2024 YELLOW    • Urine Appearance 08/31/2024 CLOUDY (A)    • Specific Gravity 08/31/2024 1.008    • Ph 08/31/2024 5.5    • Glucose, Urine 08/31/2024 NEGATIVE    • Bilirubin, Urine 08/31/2024 NEGATIVE    • Ketone, Urine 08/31/2024 NEGATIVE    • Blood, Urine 08/31/2024 NEGATIVE    • Protein, Urine 08/31/2024 NEGATIVE    • Nitrites Urine 08/31/2024 NEGATIVE    • Leukocyte Esterase 08/31/2024 2+ (A)    • SL AMB WBC, URINE 08/31/2024 10-20 (A)    • RBC, Urine 08/31/2024 NONE SEEN    • Squamous Epithelial Cells 08/31/2024 10-20 (A)    • Bacteria, UA 08/31/2024 FEW (A)    • Hyaline Casts 08/31/2024 NONE SEEN    • Comment(s) 08/31/2024     • Hepatitis B Surface Anti* 08/31/2024 60        Lab Results   Component Value Date    WBC 7.2 08/31/2024    HGB 12.1 08/31/2024    HCT 36.4 08/31/2024    MCV 91.9 08/31/2024     08/31/2024       Lab Results   Component Value Date    SODIUM 137 04/07/2024    K 3.6 04/07/2024     04/07/2024    CO2 24 04/07/2024    AGAP 6 04/07/2024    BUN 14 04/07/2024    CREATININE 0.66 04/07/2024    GLUC 110 04/07/2024    CALCIUM 8.4 04/07/2024    AST 14 04/07/2024    ALT 13 04/07/2024    ALKPHOS 51 04/07/2024    TP 6.4 04/07/2024    TBILI 0.44 04/07/2024    EGFR 121 04/07/2024         Radiology Results:   US OB < 14 weeks with transvaginal    Result Date: 8/22/2024  Narrative: FIRST TRIMESTER  OBSTETRIC ULTRASOUND, COMPLETE INDICATION: O26.841: Uterine size-date discrepancy, first trimester. Discrepancy in dating. LMP: 6/14/2024. COMPARISON: None. TECHNIQUE: Transabdominal ultrasound of the pelvis was performed. Additional transvaginal imaging was then performed to better assess the gestation, myometrial/endometrial architecture and ovarian parenchymal detail. The study includes volumetric sweeps and traditional still imaging technique. FINDINGS: A single live intrauterine gestation is identified. Cardiac activity is present. Heart rate of 178 bpm. YOLK SAC: Present and normal in size and appearance. MEAN GESTATIONAL SAC SIZE: 40.0 mm = 9w 2d MEAN CROWN RUMP LENGTH: 27.0 mm = 9w 4d FETAL ANATOMY: Appropriate for gestational age. AMNIOTIC FLUID/SAC SHAPE: Within expected normal range. PLACENTA: The placenta is appropriate for gestational age. There is no significant subchorionic fluid collection. UTERUS/ADNEXA: The uterus and ovaries are within normal limits. The cervix remains closed. No free fluid present. Normal right ovary. Left ovary not visualized.     Impression: Single live intrauterine gestation at 9 weeks and 4 days based on crown-rump length. Resident: Julien Shields I, the attending radiologist, have reviewed the images and agree with the final report above. Workstation performed: [id number]     [facility] US OB < 14 weeks single or first gestation level 1    Result Date: 8/14/2024  Narrative: Live  IUP  Based on LMP would be  8weeks  6 days (sure of  LMP 06/14/2024) - EDC  03/21/2025 Based on US today measuring  7weeks 4 days  -EDC 03/30/2025  Given discrepancy on US derived  EDC and  LMP derived EDC We discussed repeating US in 7 days to confirm viability and  growth

## 2024-09-16 PROBLEM — O09.899 HISTORY OF PRETERM DELIVERY, CURRENTLY PREGNANT: Status: ACTIVE | Noted: 2024-09-16

## 2024-09-17 ENCOUNTER — ROUTINE PRENATAL (OUTPATIENT)
Dept: PERINATAL CARE | Facility: OTHER | Age: 29
End: 2024-09-17
Payer: COMMERCIAL

## 2024-09-17 ENCOUNTER — INITIAL PRENATAL (OUTPATIENT)
Dept: OBGYN CLINIC | Facility: CLINIC | Age: 29
End: 2024-09-17

## 2024-09-17 VITALS
SYSTOLIC BLOOD PRESSURE: 116 MMHG | BODY MASS INDEX: 24.87 KG/M2 | HEIGHT: 72 IN | HEART RATE: 86 BPM | DIASTOLIC BLOOD PRESSURE: 62 MMHG | WEIGHT: 183.6 LBS

## 2024-09-17 VITALS — SYSTOLIC BLOOD PRESSURE: 102 MMHG | DIASTOLIC BLOOD PRESSURE: 62 MMHG | WEIGHT: 182.8 LBS | BODY MASS INDEX: 24.79 KG/M2

## 2024-09-17 DIAGNOSIS — O09.899 HISTORY OF PRETERM DELIVERY, CURRENTLY PREGNANT: Primary | ICD-10-CM

## 2024-09-17 DIAGNOSIS — O09.899 HISTORY OF PRETERM DELIVERY, CURRENTLY PREGNANT: ICD-10-CM

## 2024-09-17 DIAGNOSIS — Z3A.13 13 WEEKS GESTATION OF PREGNANCY: ICD-10-CM

## 2024-09-17 DIAGNOSIS — Z36.82 ENCOUNTER FOR (NT) NUCHAL TRANSLUCENCY SCAN: ICD-10-CM

## 2024-09-17 DIAGNOSIS — Z36.0 ENCOUNTER FOR ANTENATAL SCREENING FOR CHROMOSOMAL ANOMALIES: ICD-10-CM

## 2024-09-17 DIAGNOSIS — Z82.49 FAMILY HISTORY OF DEEP VEIN THROMBOSIS: ICD-10-CM

## 2024-09-17 DIAGNOSIS — Z34.81 PRENATAL CARE, SUBSEQUENT PREGNANCY, FIRST TRIMESTER: ICD-10-CM

## 2024-09-17 DIAGNOSIS — O21.9 NAUSEA AND VOMITING IN PREGNANCY: ICD-10-CM

## 2024-09-17 DIAGNOSIS — Z34.91 FIRST TRIMESTER PREGNANCY: Primary | ICD-10-CM

## 2024-09-17 DIAGNOSIS — M54.50 ACUTE BILATERAL LOW BACK PAIN WITHOUT SCIATICA: ICD-10-CM

## 2024-09-17 DIAGNOSIS — O34.219 H/O CESAREAN SECTION COMPLICATING PREGNANCY: ICD-10-CM

## 2024-09-17 PROBLEM — Z98.891 HISTORY OF CLASSICAL CESAREAN SECTION: Status: ACTIVE | Noted: 2024-09-17

## 2024-09-17 PROCEDURE — 76813 OB US NUCHAL MEAS 1 GEST: CPT | Performed by: OBSTETRICS & GYNECOLOGY

## 2024-09-17 PROCEDURE — 76801 OB US < 14 WKS SINGLE FETUS: CPT | Performed by: OBSTETRICS & GYNECOLOGY

## 2024-09-17 PROCEDURE — 87591 N.GONORRHOEAE DNA AMP PROB: CPT | Performed by: OBSTETRICS & GYNECOLOGY

## 2024-09-17 PROCEDURE — 36415 COLL VENOUS BLD VENIPUNCTURE: CPT | Performed by: OBSTETRICS & GYNECOLOGY

## 2024-09-17 PROCEDURE — PNV: Performed by: OBSTETRICS & GYNECOLOGY

## 2024-09-17 PROCEDURE — 87491 CHLMYD TRACH DNA AMP PROBE: CPT | Performed by: OBSTETRICS & GYNECOLOGY

## 2024-09-17 PROCEDURE — 99244 OFF/OP CNSLTJ NEW/EST MOD 40: CPT | Performed by: NURSE PRACTITIONER

## 2024-09-17 RX ORDER — SCOLOPAMINE TRANSDERMAL SYSTEM 1 MG/1
1 PATCH, EXTENDED RELEASE TRANSDERMAL
Qty: 10 PATCH | Refills: 1 | Status: SHIPPED | OUTPATIENT
Start: 2024-09-17

## 2024-09-17 RX ORDER — ONDANSETRON 4 MG/1
4 TABLET, FILM COATED ORAL EVERY 8 HOURS PRN
Qty: 20 TABLET | Refills: 0 | Status: SHIPPED | OUTPATIENT
Start: 2024-09-17

## 2024-09-17 NOTE — LETTER
2024     Ramona Solomon MD  [address] [place] [address]  [address] [place] [address]    Patient: Ailyn Muro   YOB: 1995   Date of Visit: 2024       Dear Dr. Solomon:    Thank you for referring Ailyn Muro to me for evaluation. Below are my notes for this consultation.    If you have questions, please do not hesitate to call me. I look forward to following your patient along with you.         Sincerely,        Danette Gan MD        CC: No Recipients    Danette Gan MD  2024  5:54 PM  Sign when Signing Visit  OFFICE CONSULT      Dear Dr. Solomon,     Thank you for requesting a  consultation on your patient Ailyn Muro for the following indications:  Genetic screening    History  Medications: Prenatal vitamins, Zofran, Colace and scopolamine patch (for upcoming cruise)  Allergies to medications: Clindamycin, azithromycin, penicillins and sumatriptan  Past medical history:  migraines, ganglion cyst and accessory breast tissue  Past surgical history: Dental surgery  Past obstetrical history: In 2022 she had a  (29w5d) classical  delivery of a male  weighing four pounds following spontaneous rupture of membranes and  labor. Her son was in NICU for 7 weeks and is doing well.   Social history: Denies current use of alcohol, tobacco or drugs of abuse  First generation family history: DVT in her father (Ailyn had a negative thrombophilia workup during her last pregnancy).     Ultrasound findings: The ultrasound shows a fetus concordant with dates. The nasal bone and nuchal translucency appear normal. No malformations are seen on today's early ultrasound.       She does not report any vaginal bleeding or uterine cramping or contractions.      Specific counseling was provided on the following problems:  1. We discussed the options for genetic screening which include invasive testing on the fetal placenta or on  fetal skin cells within the amniotic fluid and compared this to noninvasive testing which includes cell free DNA screening and the sequential screen.  We reviewed the risks, the benefits and the limitations of each.  In the end patient chose to complete the cell free DNA screen.    2. We reviewed her history of spontaneous  delivery following PROM, which increases her risk for  birth in this pregnancy.  She states she was ruptured for 10 days and then went into labor. In women with a history of spontaneous  birth, treatment with progesterone therapy from 16-36 weeks gestation may reduce recurrence risk. Studies evaluating the effectiveness of progesterone therapy in reducing recurrence risk for  delivery have produced conflicting results regarding effectiveness, but have not suggested  harm with treatment.  A vaginal suppository of 200mg daily can be used empirically or initiated if cervical shortening is found.  Serial cervical surveillance (by transvaginal sonography) is helpful in predicting risk of  delivery.  Measurement of transvaginal cervical length is recommended between 16 0/7 and 23 6/7 weeks gestation. Cervical length equal to or less than 25mm before 24 weeks gestation would prompt recommendation for a transvaginal ultrasound-indicated cerclage. Surveillance for signs and symptoms of  labor and rupture of membranes are recommended during the pregnancy. After our discussion, she plans to pursue cervical surveillance.     3.  We discussed her history of prior classical  delivery  Assessment of placental location is indicated at the time of anatomy scan to assess for risk of placenta accreta spectrum (PAS), as PAS is a risk of prior . Given the increased risk of uterine rupture associated with a classical , delivery timing is recommenced between 36 and 37 weeks. If scheduled prior to 37 weeks, recommend steroids prior to delivery,     4.  We  reviewed current recommendations regarding  Flu, COVID and RSV (third trimester) vaccines by the American College of Obstetricians and Gynecologists and the Society for Maternal-Fetal Medicine. We discussed reassuring pregnancy outcome information after vaccination. We discussed the increased severity of infections and the resultant maternal and fetal complications that can arise with a severe infection including  labor.  Vaccines have been found to generate  antibodies in pregnant and lactating women similar to that observed in non-pregnant women. Vaccine-induced antibody levels were significantly greater than the levels found in response to natural infection. Immune transfer of these antibodies to neonates is found to occur via the placenta and breast milk.       Future tests recommended:  The results of her NIPT will return in 7-10 days and her OB office will order an MSAFP screen at 16-18 weeks to screen for spina bifida.       Future ultrasounds ordered today:   Cervical surveillance every 2 weeks 16-23w6d weeks   Fetal Level II ultrasound imaging is recommended at 19-20 weeks' gestation.        Split-shared decision-making between DANIEL Aguirre and myself was utilized, with the majority of the time spent by EUGENIO Aguirre.  Medical decision-making for this encounter was moderate (diagnosis moderate, data moderate and risk moderate).    I reviewed the ultrasound pictures and recommended the medical decision making transcribed in the care of this patient.      Danette Gna M.D.

## 2024-09-17 NOTE — PROGRESS NOTES
Initial Prenatal Visit  [facility]  [address], [address], [place]    Assessment/Plan:  Ailyn is a 29 y.o. year old  at 13w4d who presents for initial prenatal visit.     Supervision of normal pregnancy  - Prenatal labs reviewed and normal.  Blood type: B+  - Aneuploidy screening discussed.  Patient opts for sequential aneuploidy screening.  - Routine cervical cancer screening: Pap Up to date.  - Routine STI Screening: GC/Chlamydia sent today.  HIV/Hep B/Syphilis ordered in prenatal panel.    Additional Pregnancy Problems:   1. First trimester pregnancy  -     Chlamydia/GC amplified DNA by PCR  2. Nausea and vomiting in pregnancy  -     scopolamine (TRANSDERM-SCOP) 1 mg/3 days TD 72 hr patch; Place 1 patch on the skin over 72 hours every third day  -     ondansetron (ZOFRAN) 4 mg tablet; Take 1 tablet (4 mg total) by mouth every 8 (eight) hours as needed for nausea or vomiting  3. Acute bilateral low back pain without sciatica  -     Ambulatory Referral to Physical Therapy; Future  4. History of  delivery, currently pregnant  5. Delivery by classical  section  Assessment & Plan:  -for repeat  36w0d - 37w0d        Subjective:   CC:  Desires prenatal care  Ailyn Muro is a 29 y.o.  female who presents for prenatal care.  Pregnancy ROS: no leakage of fluid, pelvic pain, or vaginal bleeding.  Resolving nausea, + vomiting.  Reports low back pain, had similar pain in first pregnancy  Will refer to PT    The following portions of the patient's history were reviewed and updated as appropriate: allergies, current medications, past family history, past medical history, obstetric history, gynecologic history, past social history, past surgical history and problem list.      Objective:  /62   Wt 82.9 kg (182 lb 12.8 oz)   LMP 2024 (Exact Date)   BMI 24.79 kg/m²   Pregravid Weight/BMI: 82.6 kg (182 lb) (BMI 24.68)  Current Weight: 82.9 kg  (182 lb 12.8 oz)   Total Weight Gain: 0.363 kg (12.8 oz)   Pre- Vitals      Flowsheet Row Most Recent Value   Prenatal Assessment    Fetal Heart Rate 152   Prenatal Vitals    Blood Pressure 102/62   Weight - Scale 82.9 kg (182 lb 12.8 oz)   Urine Albumin/Glucose    Dilation/Effacement/Station    Vaginal Drainage    Draining Fluid No   Edema    LLE Edema None           General: Well appearing, no distress  Respiratory: Normal respiratory rate, lungs clear to auscultation, no wheezing or rales  Cardiovascular: Regular rate and rhythm, no murmurs, rubs, or gallops  Breasts: Normal bilaterally, nontender without masses, asymmetry, or nipple discharge.  Abdomen: Soft, gravid, nontender  : Urethra normal. Normal labia majora and minora. Vagina normal.  No vaginal bleeding. No vaginal discharge. Cervix visually closed.   Extremities: Warm and well perfused.  Non tender.  No edema.

## 2024-09-17 NOTE — PROGRESS NOTES
Ultrasound Probe Disinfection    A transvaginal ultrasound was performed.   Prior to use, disinfection was performed with High Level Disinfection Process ([employer]).  Probe serial number [id number] was used.    Amanda ROJAS, SHARAN  09/17/24  2:59 PM

## 2024-09-17 NOTE — PROGRESS NOTES
OFFICE CONSULT      Dear Dr. Solomon,     Thank you for requesting a  consultation on your patient Ailyn Muro for the following indications:  Genetic screening    History  Medications: Prenatal vitamins, Zofran, Colace and scopolamine patch (for upcoming cruise)  Allergies to medications: Clindamycin, azithromycin, penicillins and sumatriptan  Past medical history:  migraines, ganglion cyst and accessory breast tissue  Past surgical history: Dental surgery  Past obstetrical history: In 2022 she had a  (29w5d) classical  delivery of a male  weighing four pounds following spontaneous rupture of membranes and  labor. Her son was in NICU for 7 weeks and is doing well.   Social history: Denies current use of alcohol, tobacco or drugs of abuse  First generation family history: DVT in her father (Ailyn had a negative thrombophilia workup during her last pregnancy).     Ultrasound findings: The ultrasound shows a fetus concordant with dates. The nasal bone and nuchal translucency appear normal. No malformations are seen on today's early ultrasound.       She does not report any vaginal bleeding or uterine cramping or contractions.      Specific counseling was provided on the following problems:  1. We discussed the options for genetic screening which include invasive testing on the fetal placenta or on fetal skin cells within the amniotic fluid and compared this to noninvasive testing which includes cell free DNA screening and the sequential screen.  We reviewed the risks, the benefits and the limitations of each.  In the end patient chose to complete the cell free DNA screen.    2. We reviewed her history of spontaneous  delivery following PROM, which increases her risk for  birth in this pregnancy.  She states she was ruptured for 10 days and then went into labor. In women with a history of spontaneous  birth, treatment with progesterone therapy from  16-36 weeks gestation may reduce recurrence risk. Studies evaluating the effectiveness of progesterone therapy in reducing recurrence risk for  delivery have produced conflicting results regarding effectiveness, but have not suggested  harm with treatment.  A vaginal suppository of 200mg daily can be used empirically or initiated if cervical shortening is found.  Serial cervical surveillance (by transvaginal sonography) is helpful in predicting risk of  delivery.  Measurement of transvaginal cervical length is recommended between 16 0/7 and 23 6/7 weeks gestation. Cervical length equal to or less than 25mm before 24 weeks gestation would prompt recommendation for a transvaginal ultrasound-indicated cerclage. Surveillance for signs and symptoms of  labor and rupture of membranes are recommended during the pregnancy. After our discussion, she plans to pursue cervical surveillance.     3.  We discussed her history of prior classical  delivery  Assessment of placental location is indicated at the time of anatomy scan to assess for risk of placenta accreta spectrum (PAS), as PAS is a risk of prior . Given the increased risk of uterine rupture associated with a classical , delivery timing is recommenced between 36 and 37 weeks. If scheduled prior to 37 weeks, recommend steroids prior to delivery,     4.  We reviewed current recommendations regarding  Flu, COVID and RSV (third trimester) vaccines by the American College of Obstetricians and Gynecologists and the Society for Maternal-Fetal Medicine. We discussed reassuring pregnancy outcome information after vaccination. We discussed the increased severity of infections and the resultant maternal and fetal complications that can arise with a severe infection including  labor.  Vaccines have been found to generate  antibodies in pregnant and lactating women similar to that observed in non-pregnant women. Vaccine-induced  antibody levels were significantly greater than the levels found in response to natural infection. Immune transfer of these antibodies to neonates is found to occur via the placenta and breast milk.       Future tests recommended:  The results of her NIPT will return in 7-10 days and her OB office will order an MSAFP screen at 16-18 weeks to screen for spina bifida.       Future ultrasounds ordered today:   Cervical surveillance every 2 weeks 16-23w6d weeks   Fetal Level II ultrasound imaging is recommended at 19-20 weeks' gestation.        Split-shared decision-making between DANIEL Aguirre and myself was utilized, with the majority of the time spent by EUGENIO Aguirre.  Medical decision-making for this encounter was moderate (diagnosis moderate, data moderate and risk moderate).    I reviewed the ultrasound pictures and recommended the medical decision making transcribed in the care of this patient.      Danette Gan M.D.

## 2024-09-18 LAB
C TRACH DNA SPEC QL NAA+PROBE: NEGATIVE
N GONORRHOEA DNA SPEC QL NAA+PROBE: NEGATIVE

## 2024-09-20 ENCOUNTER — TELEPHONE (OUTPATIENT)
Age: 29
End: 2024-09-20

## 2024-09-20 NOTE — TELEPHONE ENCOUNTER
Patient calling was referred to PT from  and due to her insurance patient needs an insurance authorization prior to her PT appt.

## 2024-09-22 LAB
CFDNA.FET/CFDNA.TOTAL SFR FETUS: NORMAL %
CITATION REF LAB TEST: NORMAL
FET 13+18+21+X+Y ANEUP PLAS.CFDNA: NEGATIVE
FET CHR 21 TS PLAS.CFDNA QL: NEGATIVE
FET CHR 21 TS PLAS.CFDNA QL: NEGATIVE
FET MS X RISK WBC.DNA+CFDNA QL: NOT DETECTED
FET SEX PLAS.CFDNA DOSAGE CFDNA: NORMAL
FET TS 13 RISK PLAS.CFDNA QL: NEGATIVE
FET X + Y ANEUP RISK PLAS.CFDNA SEQ-IMP: NOT DETECTED
GA EST FROM CONCEPTION DATE: NORMAL D
GESTATIONAL AGE > 9:: YES
LAB DIRECTOR NAME PROVIDER: NORMAL
LAB DIRECTOR NAME PROVIDER: NORMAL
LABORATORY COMMENT REPORT: NORMAL
LIMITATIONS OF THE TEST: NORMAL
NEGATIVE PREDICTIVE VALUE: NORMAL
PERFORMANCE CHARACTERISTICS: NORMAL
POSITIVE PREDICTIVE VALUE: NORMAL
REF LAB TEST METHOD: NORMAL
SL AMB NOTE:: NORMAL
TEST PERFORMANCE INFO SPEC: NORMAL

## 2024-09-23 ENCOUNTER — TELEPHONE (OUTPATIENT)
Dept: FAMILY MEDICINE CLINIC | Facility: CLINIC | Age: 29
End: 2024-09-23

## 2024-09-23 NOTE — TELEPHONE ENCOUNTER
----- Message from Areli LEUNG sent at 9/20/2024  3:38 PM EDT -----  Regarding: FW: OP Rehab Insurance Referral Request  This message was not sent in error. Patient's PCP is Dr Sultana Bennett and patient has [employer] H[employer] insurance. She needs an insurance referral for her upcoming PT evaluation on 9/25/24. Per PT Administration this is how we are to request insurance referrals for patients with [facility] providers. The ordering provider is also a [facility] provider. Any questions please reach out to our office, thanks. [phone]  ----- Message -----  From: Kelsey Garcia  Sent: 9/20/2024   3:15 PM EDT  To: Areli Boucher  Subject: FW: OP Rehab Insurance Referral Request          Sent to [facility] in error.  ----- Message -----  From: Areli Boucher  Sent: 9/20/2024   7:56 AM EDT  To: [facility]  Subject: OP Rehab Insurance Referral Request              Patient is in need of insurance referral for PT  Rendering NPI:[place]- [id number]  Ordering Physician:Dr Rebeca Hills MD   Diagnosis:M54.50  Date of visit:9/25/2024 - Please put 30 visits on this referral request, thank you.    Rendering Office Phone Number:[phone]  Rendering Office Fax Number:[phone]  
Insurance referral done thru [employer]  
[de-identified] : 1 week postop visit for this 73 y/o M here with wife s/p R L cwd mastoidectomy with L meatoplasty. He has been keeping his L ear dry, and it feels tender, but otherwise has no complaints.

## 2024-09-25 ENCOUNTER — EVALUATION (OUTPATIENT)
Dept: PHYSICAL THERAPY | Facility: CLINIC | Age: 29
End: 2024-09-25
Payer: COMMERCIAL

## 2024-09-25 DIAGNOSIS — M54.50 ACUTE BILATERAL LOW BACK PAIN WITHOUT SCIATICA: Primary | ICD-10-CM

## 2024-09-25 PROCEDURE — 97161 PT EVAL LOW COMPLEX 20 MIN: CPT

## 2024-09-25 PROCEDURE — 97110 THERAPEUTIC EXERCISES: CPT

## 2024-09-25 NOTE — PROGRESS NOTES
PT Evaluation     Today's date: 2024  Patient name: Ailyn Muro  : 1995  MRN: [id number]  Referring provider: Rebeca Hills MD  Dx:   Encounter Diagnosis     ICD-10-CM    1. Acute bilateral low back pain without sciatica  M54.50 Ambulatory Referral to Physical Therapy                     Assessment  Impairments: abnormal or restricted ROM, activity intolerance, impaired physical strength, lacks appropriate home exercise program, pain with function, poor posture , poor body mechanics and endurance  Symptom irritability: low    Assessment details: Patient is a 29 year old female that presents to outpatient physical therapy with acute low back pain without sciatica. Pt is 14 weeks into second pregnancy. Pt demonstrates signs and symptoms indicate decreased movement coordination. (+) quadrant test as well as symptoms reproduction with R hip flexor mobility testing. Decrease R hip flexor strength and pain with PA joint mobility testing to L2-L5. Pt exhibits mild limitation with lumbopelvic mobility and decreased functional core strength. Pt demonstrates increased pain, decreased ROM, decreased strength, decreased activity tolerance, and decreased functional activity such as standing, walking and twisting due to pain. Pt would benefit from skilled physical therapy to address noted impairments, meet patient's goals, and to return to PLOF.   Thank you for this referral.    Understanding of Dx/Px/POC: good     Prognosis: good    Goals  STGs:   1. Pt will be able to demonstrate an increase of strength by at least 1/2 grade within 4 weeks   2. Pt will be able to achieve increased ROM by at least 25% within 4 weeks.   3. Pt will be able to report pain less than 3/10 at worse within 4 weeks.   4. Pt will be able to demonstrate improved R hip abductor strength to at least 4+/5 MMT within 4 weeks   5. Pt will be able to report no symptoms with passive hip extension beyond 15 degrees within 4 weeks.      LTGs:    1. Pt will be independent with all IADLs without pain or discomfort upon discharge.   2. Pt will be independent with HEP upon discharge   3. Pt will be able to report no pain or discomfort with all work duties and light recreational activities for a full day upon discharge.  4. Pt will be able to report 'no difficulty' with light household activities such as cleaning or lifting at least 5 lbs to waist upon discharge         Plan  Patient would benefit from: PT eval and skilled physical therapy  Planned modality interventions: cryotherapy, electrical stimulation/Russian stimulation, iontophoresis, low level laser therapy, TENS, thermotherapy: hydrocollator packs, ultrasound, traction and unattended electrical stimulation    Planned therapy interventions: abdominal trunk stabilization, IADL retraining, joint mobilization, manual therapy, massage, muscle pump exercises, neuromuscular re-education, patient education, postural training, strengthening, stretching, therapeutic activities, therapeutic training, therapeutic exercise, home exercise program, functional ROM exercises, gait training, balance/weight bearing training, balance, ADL training, activity modification, flexibility, Galindo taping and breathing training    Treatment plan discussed with: patient  Plan details: Visits: 2-3x a week   Duration: 8 weeks         Subjective Evaluation    History of Present Illness  Mechanism of injury: Pt states that she has been having low back pain/discomfort for the past over the last week or so without ABENA. Pt is 14 weeks pregnant with her second child. ( for 1st). Indicates that she had a lot of sharp, stabbing pain in her lower right side of her back, especially when walking (marching R knee forward) and when twisting. Reports that her pain also did start to occur worse after excessive vomiting and heaving. States that her pain is improved now, but still has some mild discomfort in her lower back.  Has tried  over the counter medication without relieve as well as heat without much help.   History of low back: same, sharp stabbing pain in lower back during 1st pregnancy as well as muscle soreness and pain post-partem with some improvement with chiropractic care.   Social involvement: Speech Therapist    Denies changes in bowel/bladder. Denies saddle anesthesia. Denies numbness/tingling      Goals: decrease pain  Patient Goals  Patient goals for therapy: decreased pain    Pain  Current pain ratin  At best pain ratin  At worst pain ratin  Quality: sharp, tight and discomfort          Objective     Neurological Testing     Sensation     Lumbar   Left   Intact: light touch    Right   Intact: light touch    Active Range of Motion     Lumbar   Flexion:  WFL  Extension:  with pain Restriction level: moderate  Left lateral flexion:  Restriction level: minimal  Right lateral flexion:  Restriction level: minimal  Left rotation:  Restriction level: minimal  Right rotation:  Restriction level: minimal    Joint Play   Joints within functional limits: T12, L1, L2 and S1     Pain: L3, L4 and L5   Mechanical Assessment    Cervical      Thoracic      Lumbar    Lying extension: repeated movements  Pain location: no change    Strength/Myotome Testing     Lumbar   Left   Heel walk: normal  Toe walk: normal    Right   Heel walk: normal  Toe walk: normal    Left Hip   Planes of Motion   Flexion: 4+  Extension: 4+  Abduction: 4  External rotation: 4+  Internal rotation: 4    Right Hip   Planes of Motion   Flexion: 4-  Extension: 4+  Abduction: 4+  External rotation: 4+  Internal rotation: 4+    Left Knee   Flexion: 4+  Extension: 4+    Right Knee   Flexion: 4+  Extension: 4+    Muscle Activation   Patient able to activate left transverse abdominals, left multifidus, right transverse abdominals and right multifidus.     Tests   Pain with max closing      Lumbar     Left   Positive quadrant.   Negative crossed SLR, passive SLR and  "slump test.     Right   Positive quadrant.   Negative crossed SLR, passive SLR and slump test.     Left Pelvic Girdle/Sacrum   Negative: active SLR test.     Right Pelvic Girdle/Sacrum   Negative: active SLR test.     Left Hip   Negative Ely's, ANGELINA and FADIR.   Modified Mart: Negative.     Right Hip   Negative Ely's, ANGELINA and FADIR.   Modified Mart: Positive.     Functional Assessment      Squat    within functional limits         Precautions:   Acute bilateral low back pain without sciatica   History of  delivery, currently pregnant - 14 weeks (24)   History of classical  section       Daily Treatment Diary    Date             FOTO IE -             Re-Eval IE               Manuals    Hip flexor - mart stretch  JM (R)                                                   Neuro Re-Ed     PPT             PPT with bridge             Pallof press                                                                 Ther Ex    Bike - ROM             Hip flexor stretch - half kneeling 10\" hold 3x            SLR 3x10            Clamshell (B)             Reverse Clamshell (B)             Standing hip abd and ext  machine                                                                             Ther Activity                              Gait Training                              Modalities                                       "

## 2024-10-01 ENCOUNTER — TELEPHONE (OUTPATIENT)
Age: 29
End: 2024-10-01

## 2024-10-01 NOTE — TELEPHONE ENCOUNTER
Left a VM with pt to give me a call back regarding stool sample. Please transfer call back to me when pt calls.

## 2024-10-05 ENCOUNTER — NURSE TRIAGE (OUTPATIENT)
Dept: OTHER | Facility: OTHER | Age: 29
End: 2024-10-05

## 2024-10-05 NOTE — TELEPHONE ENCOUNTER
"Reason for Disposition   Patient sounds very sick or weak to the triager    Answer Assessment - Initial Assessment Questions  1. LOCATION: \"Where does it hurt?\"       Front forehead, occipital - down neck    2. ONSET: \"When did the headache start?\" (e.g., minutes, hours or days)       For the past week; usually improved with Tylenol     3. PATTERN: \"Does the pain come and go, or has it been constant since it started?\"      Goes away with medication, then returns    4. SEVERITY: \"How bad is the pain?\" and \"What does it keep you from doing?\"       4/10    5. RECURRENT SYMPTOM: \"Have you ever had headaches before?\" If Yes, ask: \"When was the last time?\" and \"What happened that time?\"       Yes  6. CAUSE: \"What do you think is causing the headache?\"      History of iron deficiency anemia    7. MIGRAINE: \"Have you been diagnosed with migraine headaches?\" If Yes, ask: \"Is this headache similar?\"       Patient thinks she has     8. HEAD INJURY: \"Has there been any recent injury to the head?\"       Denies    9. OTHER SYMPTOMS: \"Do you have any other symptoms?\" (e.g., abdomen pain, blurred vision, fever, stiff neck; swelling of hands, face, or feet)      Shortness of breath -exertional; Denies chest pain, blurred vision, fever; denies abdominal pain; denies swelling of hands, face, feet  Denies vaginal bleeding, contractions, LOF  Feeling very fatigued for about a week, states that is worse yesterday and today. Denies other symptoms of illness      10. PREGNANCY: \"How many weeks pregnant are you?\"        16  11. GUIDO: \"What date are you expecting to deliver?\"        3/21/24    Protocols used: Pregnancy - Headache-Adult-AH    "

## 2024-10-05 NOTE — TELEPHONE ENCOUNTER
Per on-call provider, patient should try to rest and follow up with the office this week. Her hemoglobin was 12 less than a month ago. If she starts to feel short of breath at rest, should go to the nearest ED. Could be viral; rest and hydration.

## 2024-10-08 ENCOUNTER — ROUTINE PRENATAL (OUTPATIENT)
Dept: PERINATAL CARE | Facility: OTHER | Age: 29
End: 2024-10-08
Payer: COMMERCIAL

## 2024-10-08 VITALS
DIASTOLIC BLOOD PRESSURE: 70 MMHG | BODY MASS INDEX: 25.06 KG/M2 | WEIGHT: 185 LBS | HEIGHT: 72 IN | HEART RATE: 94 BPM | SYSTOLIC BLOOD PRESSURE: 112 MMHG

## 2024-10-08 DIAGNOSIS — Z3A.16 16 WEEKS GESTATION OF PREGNANCY: ICD-10-CM

## 2024-10-08 DIAGNOSIS — O09.899 HISTORY OF PRETERM DELIVERY, CURRENTLY PREGNANT: Primary | ICD-10-CM

## 2024-10-08 PROCEDURE — 99213 OFFICE O/P EST LOW 20 MIN: CPT | Performed by: NURSE PRACTITIONER

## 2024-10-08 PROCEDURE — 76817 TRANSVAGINAL US OBSTETRIC: CPT | Performed by: OBSTETRICS & GYNECOLOGY

## 2024-10-08 NOTE — PROGRESS NOTES
Ultrasound Probe Disinfection    A transvaginal ultrasound was performed.   Prior to use, disinfection was performed with High Level Disinfection Process ([employer]).  Probe serial number F2: [id number] was used.    Eula Martin  10/08/24  2:02 PM

## 2024-10-09 NOTE — PROGRESS NOTES
Ailyn Muro presents today for transvaginal ultrasound at  for the indication of prior  delivery at 29w5d weeks via classical  delivery.  On exam she appears well, in no acute distress, and her abdomen is nontender. She had a low risk NIPT.     Ultrasound findings today are as follows. A viable single intrauterine pregnancy is visualized.  Amniotic fluid is within normal limits.  A transvaginal ultrasound was performed to assess the cervix. The cervical length was 3.68 cm, which is normal for her current gestational age. There was no significant funneling or dynamic changes appreciated.    Evaluation and Management: We reviewed today's findings and her questions were answered. We discussed follow-up. She is scheduled for cervical surveillance on 10/22/2024 and a fetal anatomic survey with transvaginal ultrasound on 2024.  Should you have any questions, please do not hesitate to contact our office.    Split-shared decision-making between DANIEL Aguirre and myself was utilized, with the majority of the time spent by EUGENIO Aguirre.  Medical decision making was low (diagnosis low, data limited and risk low).    I reviewed the ultrasound pictures and recommended the medical decision making transcribed in the care of this patient.      Danette Gan M.D.

## 2024-10-15 LAB — CALPROTECTIN STL-MCNT: 70 MCG/G

## 2024-10-18 ENCOUNTER — ROUTINE PRENATAL (OUTPATIENT)
Dept: OBGYN CLINIC | Facility: MEDICAL CENTER | Age: 29
End: 2024-10-18

## 2024-10-18 VITALS
BODY MASS INDEX: 25.4 KG/M2 | SYSTOLIC BLOOD PRESSURE: 100 MMHG | DIASTOLIC BLOOD PRESSURE: 68 MMHG | WEIGHT: 187.5 LBS | HEIGHT: 72 IN

## 2024-10-18 DIAGNOSIS — O99.340 ANXIETY DURING PREGNANCY: Primary | ICD-10-CM

## 2024-10-18 DIAGNOSIS — Z3A.18 18 WEEKS GESTATION OF PREGNANCY: ICD-10-CM

## 2024-10-18 DIAGNOSIS — F41.9 ANXIETY DURING PREGNANCY: Primary | ICD-10-CM

## 2024-10-18 PROCEDURE — PNV: Performed by: STUDENT IN AN ORGANIZED HEALTH CARE EDUCATION/TRAINING PROGRAM

## 2024-10-18 RX ORDER — SERTRALINE HYDROCHLORIDE 25 MG/1
25 TABLET, FILM COATED ORAL DAILY
Qty: 90 TABLET | Refills: 3 | Status: SHIPPED | OUTPATIENT
Start: 2024-10-18 | End: 2025-10-18

## 2024-10-18 NOTE — PROGRESS NOTES
Routine Prenatal Visit  [facility] [address], [place], [place]    Assessment/Plan:  Ailyn is a 29 y.o. year old  at 18w0d who presents for routine prenatal visit.     1. Anxiety during pregnancy  Assessment & Plan:  - Reporting some anxiety related to the pregnancy and her previous delivery.   TATY-7 score is 10.  She is interested in starting zoloft.  Orders:  -     sertraline (Zoloft) 25 mg tablet; Take 1 tablet (25 mg total) by mouth daily  2. 18 weeks gestation of pregnancy  -     Alpha fetoprotein, maternal; Future  -     Alpha fetoprotein, maternal        Subjective:     CC: Prenatal care    Ailyn Muro is a 29 y.o.  female who presents for routine prenatal care at 18w0d.  Pregnancy ROS: Denies leakage of fluid, pelvic pain, or vaginal bleeding.  Reports fetal movement.    Reporting some anxiety related to the pregnancy and her previous delivery.   TATY-7 score is 10.  She is interested in starting zoloft.    The following portions of the patient's history were reviewed and updated as appropriate: allergies, current medications, past family history, past medical history, obstetric history, gynecologic history, past social history, past surgical history and problem list.      Objective:  /68   Ht 6' (1.829 m)   Wt 85 kg (187 lb 8 oz)   LMP 2024 (Exact Date)   BMI 25.43 kg/m²   Pregravid Weight/BMI: 82.6 kg (182 lb) (BMI 24.68)  Current Weight: 85 kg (187 lb 8 oz)   Total Weight Gain: 2.495 kg (5 lb 8 oz)   Pre- Vitals      Flowsheet Row Most Recent Value   Prenatal Assessment    Movement Present   Prenatal Vitals    Blood Pressure 100/68   Weight - Scale 85 kg (187 lb 8 oz)   Urine Albumin/Glucose    Dilation/Effacement/Station    Vaginal Drainage    Draining Fluid No   Edema    LLE Edema None   RLE Edema None   Facial Edema None             General: Well appearing, no distress  Respiratory: Unlabored breathing  Cardiovascular: Regular  rate.  Abdomen: Soft, gravid, nontender  Fundal Height: Appropriate for gestational age.  Extremities: Warm and well perfused.  Non tender.

## 2024-10-18 NOTE — ASSESSMENT & PLAN NOTE
- Reporting some anxiety related to the pregnancy and her previous delivery.   TATY-7 score is 10.  She is interested in starting zoloft.

## 2024-10-22 ENCOUNTER — ROUTINE PRENATAL (OUTPATIENT)
Dept: PERINATAL CARE | Facility: OTHER | Age: 29
End: 2024-10-22
Payer: COMMERCIAL

## 2024-10-22 VITALS
BODY MASS INDEX: 25.47 KG/M2 | SYSTOLIC BLOOD PRESSURE: 108 MMHG | DIASTOLIC BLOOD PRESSURE: 70 MMHG | HEIGHT: 72 IN | HEART RATE: 101 BPM | WEIGHT: 188 LBS

## 2024-10-22 DIAGNOSIS — O09.899 HISTORY OF PRETERM DELIVERY, CURRENTLY PREGNANT: ICD-10-CM

## 2024-10-22 DIAGNOSIS — Z3A.18 18 WEEKS GESTATION OF PREGNANCY: Primary | ICD-10-CM

## 2024-10-22 PROCEDURE — 76817 TRANSVAGINAL US OBSTETRIC: CPT | Performed by: OBSTETRICS & GYNECOLOGY

## 2024-10-22 NOTE — PROGRESS NOTES
Ultrasound Probe Disinfection    A transvaginal ultrasound was performed.   Prior to use, disinfection was performed with High Level Disinfection Process ([employer]).  Probe serial number F2: [id number] was used.    Ofelia Licea  10/22/24  2:02 PM

## 2024-10-22 NOTE — PROGRESS NOTES
The patient was seen today for an ultrasound.  Please see ultrasound report (located under Ob Procedures) for additional details.   Thank you very much for allowing us to participate in the care of this very nice patient.  Should you have any questions, please do not hesitate to contact me.     Garland Chao MD FACOG  Attending Physician, Maternal-Fetal Medicine  [facility]

## 2024-10-25 LAB
# FETUSES US: 1
AFP ADJ MOM SERPL: 1.23
AFP INTERP SERPL-IMP: NORMAL
AFP SERPL-MCNC: 53.1 NG/ML
AGE: NORMAL
DONATED EGG PATIENT QL: NO
GA CLIN EST: 18.9 WEEKS
GA METHOD: NORMAL
HX OF NTD NARR: NO
HX OF TRISOMY 21 NARR: NO
IDDM PATIENT QL: NO
NEURAL TUBE DEFECT RISK FETUS: NORMAL %
SL AMB REPEAT SPECIMEN: NO

## 2024-10-29 ENCOUNTER — NURSE TRIAGE (OUTPATIENT)
Age: 29
End: 2024-10-29

## 2024-10-29 NOTE — TELEPHONE ENCOUNTER
"Spoke to patient who is 19w4d, .  She reports being constipated and straining to have a BM.  While doing that she started with bright red vaginal spotting.  She reports urinating about 30 mins later and it had resolved.  She does report some cramping but feels it is an upset stomach.  She reports she was taking miralax but did stop.  She was advised to increase fluids and go back to taking the miralax. She was also advised to call back if bleeding continues or becomes heavy, or has any severe pain.  Pt verbalized understanding, no further questions or concerns at this time.       SEC to on call provider for any additional recommendations. Per provider:  No additional recommendations.    Reason for Disposition   SPOTTING (single or brief episode)    Answer Assessment - Initial Assessment Questions  1. ONSET: \"When did this bleeding start?\"        Today  2. BLEEDING SEVERITY: \"Describe the bleeding that you are having.\" \"How much bleeding is there?\"       Bright red when wiping  after BM  3. ABDOMEN PAIN: \"Do you have any pain?\" \"How bad is the pain?\"  (e.g., Scale 0-10; none, mild, moderate, or severe)      cramping  4. PREGNANCY: \"Do you know how many weeks or months pregnant you are?\" \"When was the first day of your last normal menstrual period?\"      19w4d  5. ULTRASOUND: \"Have you had an ultrasound during this pregnancy?\"  Note: To confirm intrauterine pregnancy, placenta location.      yes  6. HEMODYNAMIC STATUS: \"Are you weak or feeling lightheaded?\" If Yes, ask: \"Can you stand and walk normally?\"       denies  7. OTHER SYMPTOMS: \"What other symptoms are you having with the bleeding?\" (e.g., passed tissue, vaginal discharge, fever, menstrual-type cramps)      Constipated    Protocols used: Pregnancy - Vaginal Bleeding Less Than 20 Weeks EGA-Adult-OH    "

## 2024-11-05 ENCOUNTER — TELEPHONE (OUTPATIENT)
Dept: OBGYN CLINIC | Facility: MEDICAL CENTER | Age: 29
End: 2024-11-05

## 2024-11-05 NOTE — TELEPHONE ENCOUNTER
OB nurse navigator attempted to get in contact with patient for 2nd Trimester call but unable to complete call at this time. Patient unavailable. Left message on vm to return call. NormOxyst message sent.

## 2024-11-06 NOTE — ASSESSMENT & PLAN NOTE
PPROM delivered @ 29w 5d; with classical incision   Has cervical surveillance scheduled for 24

## 2024-11-07 ENCOUNTER — ROUTINE PRENATAL (OUTPATIENT)
Age: 29
End: 2024-11-07
Payer: COMMERCIAL

## 2024-11-07 VITALS
DIASTOLIC BLOOD PRESSURE: 62 MMHG | WEIGHT: 186 LBS | HEART RATE: 106 BPM | SYSTOLIC BLOOD PRESSURE: 96 MMHG | BODY MASS INDEX: 25.19 KG/M2 | HEIGHT: 72 IN

## 2024-11-07 DIAGNOSIS — O09.899 HISTORY OF PRETERM DELIVERY, CURRENTLY PREGNANT: ICD-10-CM

## 2024-11-07 DIAGNOSIS — Z3A.20 20 WEEKS GESTATION OF PREGNANCY: Primary | ICD-10-CM

## 2024-11-07 DIAGNOSIS — Z98.891 HISTORY OF CLASSICAL CESAREAN SECTION: ICD-10-CM

## 2024-11-07 DIAGNOSIS — Z36.3 ENCOUNTER FOR ANTENATAL SCREENING FOR MALFORMATION: ICD-10-CM

## 2024-11-07 DIAGNOSIS — Z03.75 ENCOUNTER FOR SUSPECTED CERVICAL SHORTENING RULED OUT: ICD-10-CM

## 2024-11-07 PROCEDURE — 99212 OFFICE O/P EST SF 10 MIN: CPT | Performed by: STUDENT IN AN ORGANIZED HEALTH CARE EDUCATION/TRAINING PROGRAM

## 2024-11-07 PROCEDURE — 76817 TRANSVAGINAL US OBSTETRIC: CPT | Performed by: STUDENT IN AN ORGANIZED HEALTH CARE EDUCATION/TRAINING PROGRAM

## 2024-11-07 PROCEDURE — 76805 OB US >/= 14 WKS SNGL FETUS: CPT | Performed by: STUDENT IN AN ORGANIZED HEALTH CARE EDUCATION/TRAINING PROGRAM

## 2024-11-07 NOTE — PROGRESS NOTES
"[facility]: Ms. Muro was seen today for anatomic survey and cervical length screening ultrasound.  See ultrasound report under \"OB Procedures\" tab.      MDM:   I. Diagnoses/Problems addressed:  minimal  II.  Data: I reviewed 2 lab tests ordered by another provider.  III.  Risk of morbidity:  minimal    Please don't hesitate to contact our office with any concerns or questions.  -Ailyn Morin MD      "

## 2024-11-07 NOTE — PROGRESS NOTES
Ultrasound Probe Disinfection    A transvaginal ultrasound was performed.   Prior to use, disinfection was performed with High Level Disinfection Process ([employer]).  Probe serial number S2: [id number] was used.    Ofelia Licea  11/07/24  8:03 AM

## 2024-11-11 ENCOUNTER — ROUTINE PRENATAL (OUTPATIENT)
Dept: OBGYN CLINIC | Facility: MEDICAL CENTER | Age: 29
End: 2024-11-11

## 2024-11-11 VITALS — SYSTOLIC BLOOD PRESSURE: 108 MMHG | BODY MASS INDEX: 26.08 KG/M2 | DIASTOLIC BLOOD PRESSURE: 74 MMHG | WEIGHT: 192.3 LBS

## 2024-11-11 DIAGNOSIS — F41.9 ANXIETY DURING PREGNANCY: ICD-10-CM

## 2024-11-11 DIAGNOSIS — Z3A.21 21 WEEKS GESTATION OF PREGNANCY: Primary | ICD-10-CM

## 2024-11-11 DIAGNOSIS — O09.899 HISTORY OF PRETERM DELIVERY, CURRENTLY PREGNANT: ICD-10-CM

## 2024-11-11 DIAGNOSIS — O34.219 H/O CESAREAN SECTION COMPLICATING PREGNANCY: ICD-10-CM

## 2024-11-11 DIAGNOSIS — O99.340 ANXIETY DURING PREGNANCY: ICD-10-CM

## 2024-11-11 PROCEDURE — PNV: Performed by: NURSE PRACTITIONER

## 2024-11-11 NOTE — PROGRESS NOTES
Ambulatory Visit  Name: Ailyn Muro      : 1995      MRN: [id number]  Encounter Provider: DANIEL Herbert  Encounter Date: 2024   Encounter department: [facility]    Assessment & Plan  21 weeks gestation of pregnancy  Continue prenatal vitamin daily  Follow-up with  center scheduled 24 for growth scan.   Declines influenza vaccine after counseling       History of  delivery, currently pregnant  PPROM delivered @ 29w 5d; with classical incision   Has cervical surveillance scheduled for 24         H/O  section complicating pregnancy  Recommend repeat , prior classical incision          Anxiety during pregnancy  Where to call office with worsening symptoms, thoughts of self-harm or harm to others       RTO 4 weeks f/u US   History of Present Illness     Ailyn Muro is a 29 y.o. female   Denies loss of fluid, vaginal bleeding or abdominal pain.  Did have some cramping over the past week.  Confirms frequent fetal movement.  Tolerating prenatal vitamin well.  Did not start Zoloft.  Patient states she is doing well.  Declines influenza vaccine after counseling.    History obtained from : patient  Review of Systems   Constitutional:  Negative for chills and fever.   Respiratory: Negative.     Cardiovascular: Negative.            Objective     /74   Wt 87.2 kg (192 lb 4.8 oz)   LMP 2024 (Exact Date)   BMI 26.08 kg/m²     Physical Exam  Constitutional:       Appearance: Normal appearance.   Neurological:      Mental Status: She is alert and oriented to person, place, and time.   Psychiatric:         Mood and Affect: Mood normal.         Behavior: Behavior normal.

## 2024-11-21 ENCOUNTER — HOSPITAL ENCOUNTER (OUTPATIENT)
Facility: HOSPITAL | Age: 29
Discharge: HOME | End: 2024-11-21
Attending: STUDENT IN AN ORGANIZED HEALTH CARE EDUCATION/TRAINING PROGRAM | Admitting: OBSTETRICS & GYNECOLOGY
Payer: COMMERCIAL

## 2024-11-21 ENCOUNTER — TELEPHONE (OUTPATIENT)
Age: 29
End: 2024-11-21

## 2024-11-21 ENCOUNTER — NURSE TRIAGE (OUTPATIENT)
Age: 29
End: 2024-11-21

## 2024-11-21 VITALS
HEART RATE: 95 BPM | SYSTOLIC BLOOD PRESSURE: 118 MMHG | WEIGHT: 189 LBS | HEIGHT: 72 IN | DIASTOLIC BLOOD PRESSURE: 70 MMHG | RESPIRATION RATE: 18 BRPM | TEMPERATURE: 98.7 F | OXYGEN SATURATION: 99 % | BODY MASS INDEX: 25.6 KG/M2

## 2024-11-21 PROBLEM — N89.8 VAGINAL DISCHARGE: Status: ACTIVE | Noted: 2024-11-21

## 2024-11-21 PROCEDURE — 76815 OB US LIMITED FETUS(S): CPT

## 2024-11-21 PROCEDURE — 76815 OB US LIMITED FETUS(S): CPT | Performed by: OBSTETRICS & GYNECOLOGY

## 2024-11-21 PROCEDURE — 99213 OFFICE O/P EST LOW 20 MIN: CPT

## 2024-11-21 PROCEDURE — 99213 OFFICE O/P EST LOW 20 MIN: CPT | Performed by: OBSTETRICS & GYNECOLOGY

## 2024-11-21 NOTE — PROGRESS NOTES
"L&D Triage Note - OB/GYN  Ailyn Muro 29 y.o. female MRN: [id number]  Unit/Bed#:  TRIAGE 3-01 Encounter: [id number]      ASSESSMENT/PLAN  Ailyn Muro is a 29 y.o.  at 22w6d who presents to triage for leakage of fluid. Rupture workup was negative. Patient to be discharged home with return precautions.    1) R/o PPROM  Neg ferning,nitrizine/pooling  LEE ANN 10.45    1)  22 weeks gestation of pregnancy  - Continue routine prenatal care  - Discharge from OB triage with  labor precautions                 - Reviewed rupture of membranes, false vs true labor, decreased fetal movement, and vaginal bleeding              - Pt to call provider with any concerns and follow up at her next    [facility] a patient [facility]  Discussed with Dr. Lamar.    ______________    SUBJECTIVE    GUIDO: Estimated Date of Delivery: 3/21/25    HPI:  Ailyn Muro is a 29 y.o.  22w6d with hx of PPROM at 29 wks with prior pregnancy presents to L&D triage following an episode of \"rupture of fluids\" while seated at work. She reports that she stood up from chair at work and noticed a \"wet patch\". No trauma, strain, or exertion preceded this episode. She states that the fluid was clear and soaked underwear. Patient denies abdominal pain, contractions, vaginal bleeding. Patient reports increased fetal movement in triage.    Contractions: None  Leakage of fluid: yes   Vaginal bleeding: None  Fetal movement: present    Obstetrical history is significant for hx of PPROM in prior pregnancy, 29 wk delivery via CS-Classical d/t transverse lie.    ROS:  General: Denies: fever  Cardiovascular: negative  HEENT: Denies blurry vision, denies headache  Pulmonary: Denies cough, shortness of breath or dyspnea on exertion  GI: no abdominal pain  : Denies none    Physical Exam:  General: in no apparent distress, alert, oriented times 3, and afebrile  Cardiovascular: RRR  Lungs: non-labored breathing, CTAB  Abdomen: Soft, " "Non-tender, Gravid  Lower extremities: nontender    Speculum Exam:   KOH/Wet mount     Infection:   - no clue cells    - no hyphae   - no trichomonads present     Membrane status   - no ferning   - no nitrazene   - no pooling   Fundal height: Appropriate for gestational age  Extremities: Warm and well perfused, non-tender      OBJECTIVE:  /70 (BP Location: Right arm)   Pulse 95   Temp 98.7 °F (37.1 °C) (Oral)   Resp 18   Ht 6' (1.829 m)   Wt 85.7 kg (189 lb)   LMP 06/14/2024 (Exact Date)   SpO2 99%   BMI 25.63 kg/m²   Body mass index is 25.63 kg/m².  Labs: No results found for this or any previous visit (from the past 24 hours).         IMAGING:             TAUS   LEE ANN      - Q1 4.75 cm     - Q2 3.31 cm     - Q3 0 cm     - Q4 2.39 cm     - Total: 10.45 cm     Placenta: posterior   Presentation: breech      Bruno Negron, MS3  11/21/2024  2:37 PM    Danette Burr  PGY-1 OB/GYN  11/21/24  5:40 PM     Portions of the record may have been created with voice recognition software.  Occasional wrong word or \"sound a like\" substitutions may have occurred due to the inherent limitations of voice recognition software.  Read the chart carefully and recognize, using context, where substitutions have occurred    "

## 2024-11-21 NOTE — DISCHARGE INSTRUCTIONS
Pregnancy at 19 to 22 Weeks   WHAT YOU NEED TO KNOW:   What changes are happening with my body?  Now that you are in your second trimester, you have more energy. You may also be feeling hungrier than usual. You may be gaining about ½ to 1 pound a week, and your pregnancy is beginning to show. You may need to start wearing maternity clothes. As your baby gets larger, you may have other symptoms. These may include body aches or stretch marks on your abdomen, breasts, thighs, or buttocks.  How do I care for myself at this stage of my pregnancy?       Eat a variety of healthy foods.  Healthy foods include fruits, vegetables, whole-grain breads, low-fat dairy foods, beans, lean meats, and fish. Drink liquids as directed. Ask how much liquid to drink each day and which liquids are best for you. Limit caffeine to less than 200 milligrams each day. Limit your intake of fish to 2 servings each week. Choose fish low in mercury such as canned light tuna, shrimp, salmon, cod, or tilapia. Do not  eat fish high in mercury such as swordfish, tilefish, katrin mackerel, and shark.         Take prenatal vitamins as directed.  Your need for certain vitamins and minerals, such as folic acid, increases during pregnancy. Prenatal vitamins provide some of the extra vitamins and minerals you need. Prenatal vitamins may also help to decrease the risk of certain birth defects.         Talk to your healthcare provider about exercise.  Moderate exercise can help you stay fit. Your healthcare provider will help you plan an exercise program that is safe for you during pregnancy.         Do not smoke.  Smoking increases your risk of a miscarriage and other health problems during your pregnancy. Smoking can cause your baby to be born too early or weigh less at birth. Ask your healthcare provider for information if you need help quitting.    Do not drink alcohol.  Alcohol passes from your body to your baby through the placenta. It can affect your  baby's brain development and cause fetal alcohol syndrome (FAS). FAS is a group of conditions that causes mental, behavior, and growth problems.    Talk to your healthcare provider before you take any medicines.  Many medicines may harm your baby if you take them when you are pregnant. Do not take any medicines, vitamins, herbs, or supplements without first talking to your healthcare provider. Never use illegal or street drugs (such as marijuana or cocaine) while you are pregnant.    What are some safety tips during pregnancy?   Avoid hot tubs and saunas.  Do not use a hot tub or sauna while you are pregnant, especially during your first trimester. Hot tubs and saunas may raise your baby's temperature and increase the risk of birth defects.    Avoid toxoplasmosis.  This is an infection caused by eating raw meat or being around infected cat feces. It can cause birth defects, miscarriages, and other problems. Wash your hands after you touch raw meat. Make sure any meat is well-cooked before you eat it. Avoid raw eggs and unpasteurized milk. Use gloves or ask someone else to clean your cat's litter box while you are pregnant.       What changes are happening with my baby?  By 22 weeks, your baby is about 8 inches long from the top of the head to the rump (baby's bottom). Your baby also weighs about 1 pound. Your baby is becoming much more active. You may be able to feel the baby move inside you now. The first movements may not be that noticeable. They may feel like a fluttering sensation. As time goes on, your baby's movements will become stronger and more noticeable.  What do I need to know about prenatal care?  During the first 28 weeks of your pregnancy, you will see your healthcare provider once a month. Your healthcare provider will check your blood pressure and weight. You may also need the following:  A urine test  may also be done to check for sugar and protein. These can be signs of gestational diabetes or  infection. Protein in your urine may also be a sign of preeclampsia. Preeclampsia is a condition that can develop during week 20 or later of your pregnancy. It causes high blood pressure, and it can cause problems with your kidneys and other organs.    Fundal height  is a measurement of your uterus to check your baby's growth. This number is usually the same as the number of weeks that you have been pregnant.    A fetal ultrasound  shows pictures of your baby inside your uterus. It shows your baby's development. The movement and position of your baby can also be seen. Your healthcare provider may be able to tell you what your baby's gender is during the ultrasound.         Your baby's heart rate  will be checked.    When should I seek immediate care?   You develop a severe headache that does not go away.    You have new or increased vision changes, such as blurred or spotted vision.    You have new or increased swelling in your face or hands.    You have vaginal spotting or bleeding.    Your water broke or you feel warm water gushing or trickling from your vagina.    When should I call my doctor or obstetrician?   You have abdominal cramps, pressure, or tightening.    You have a change in vaginal discharge.    You cannot keep food or drinks down, and you are losing weight.    You have chills or a fever.    You have vaginal itching, burning, or pain.    You have yellow, green, white, or foul-smelling vaginal discharge.    You have pain or burning when you urinate, less urine than usual, or pink or bloody urine.    You have questions or concerns about your condition or care.    CARE AGREEMENT:   You have the right to help plan your care. Learn about your health condition and how it may be treated. Discuss treatment options with your healthcare providers to decide what care you want to receive. You always have the right to refuse treatment. The above information is an  only. It is not intended as medical  advice for individual conditions or treatments. Talk to your doctor, nurse or pharmacist before following any medical regimen to see if it is safe and effective for you.  © Copyright "[employer] 2022 Information is for End User's use only and may not be sold, redistributed or otherwise used for commercial purposes. All illustrations and images included in CareNotes® are the copyrighted property of [employer] or [employer]

## 2024-11-21 NOTE — PROCEDURES
Ailyn Muro, a  at 22w6d with an GUDIO of 3/21/2025, by Last Menstrual Period, was seen at [facility] for the following procedure(s): $Procedure Type: LEE ANN]               4 Quadrant LEE ANN  LEE ANN Q1 (cm): 4.8 cm  LEE ANN Q2 (cm): 3.3 cm  LEE ANN Q3 (cm): 0 cm  LEE ANN Q4 (cm): 2.4 cm  LEE ANN TOTAL (cm): 10.5 cm    Danette Burr  PGY-1 OB/GYN  24  5:42 PM

## 2024-11-21 NOTE — TELEPHONE ENCOUNTER
"OB patient 22w6d  states she had fluid leak through underwear and pants on to chair around 1110. Noticed when standing up after sitting. Denies continuous trickle. Fluid is clear and odorless. Has history of PPROM at 29 weeks. Denies abdominal pain, vaginal bleeding. Endorses fetal movement. Advised evaluation in triage. Will check with provider regarding [facility] or AL.     ESC sent to DANIEL Connelly for evaluation    Outgoing call to patient. Informed to be seen at [facility].     ESC sent to [facility] L&D Charge RN as notification.       Reason for Disposition   Leakage of fluid from vagina  (Exception: Patient is uncertain, but thinks it might be urine incontinence.)    Answer Assessment - Initial Assessment Questions  1. ONSET: \"When did you notice the fluid coming out of your vagina?\"         1110 - noticed spot on chair after sitting, pants and underwear saturated. No continued trickle of fluid. Clear and odorless.   2. CONTRACTIONS: \"Are you having any contractions?\" If Yes, ask: \"Describe the contractions that you are having.\" (e.g., duration, frequency, regularity, severity)      denies  3. GUIDO: \"What date are you expecting to deliver?\"      3/21/25  4. PARITY: \"Have you had a baby before?\" If Yes, ask: \"How long did the labor last?\"      -   5. FETAL MOVEMENT: \"Has the baby's movement decreased or changed significantly from normal?\"      Movement is normal  6. OTHER SYMPTOMS: \"Do you have any other symptoms?\" (e.g., abdomen pain, fever, hand or face swelling, vaginal bleeding)      Denies    Protocols used: Pregnancy - Rupture of Membranes Suspected-Adult-OH    "

## 2024-11-22 ENCOUNTER — ROUTINE PRENATAL (OUTPATIENT)
Dept: PERINATAL CARE | Facility: OTHER | Age: 29
End: 2024-11-22
Payer: COMMERCIAL

## 2024-11-22 VITALS
HEART RATE: 107 BPM | HEIGHT: 72 IN | DIASTOLIC BLOOD PRESSURE: 70 MMHG | BODY MASS INDEX: 25.76 KG/M2 | WEIGHT: 190.2 LBS | SYSTOLIC BLOOD PRESSURE: 100 MMHG

## 2024-11-22 DIAGNOSIS — O09.899 HISTORY OF PRETERM DELIVERY, CURRENTLY PREGNANT: Primary | ICD-10-CM

## 2024-11-22 DIAGNOSIS — Z3A.23 23 WEEKS GESTATION OF PREGNANCY: ICD-10-CM

## 2024-11-22 PROCEDURE — 76815 OB US LIMITED FETUS(S): CPT | Performed by: OBSTETRICS & GYNECOLOGY

## 2024-11-22 PROCEDURE — 76817 TRANSVAGINAL US OBSTETRIC: CPT | Performed by: OBSTETRICS & GYNECOLOGY

## 2024-11-22 PROCEDURE — 99213 OFFICE O/P EST LOW 20 MIN: CPT | Performed by: NURSE PRACTITIONER

## 2024-11-22 NOTE — PROGRESS NOTES
"[facility]: Ailyn Muro was seen today for serial cervical length screening ultrasound.  See ultrasound report under \"OB Procedures\" tab.   Please don't hesitate to contact our office with any concerns or questions.  -Tierra Merchant MD      "
Ultrasound Probe Disinfection    A transvaginal ultrasound was performed.   Prior to use, disinfection was performed with High Level Disinfection Process ([employer]).  Probe serial number [id number]: [id number] was used.    Cee Ambriz  11/22/24  9:35 AM   
Carmen Terry granddaughter

## 2024-12-03 DIAGNOSIS — Z3A.24 24 WEEKS GESTATION OF PREGNANCY: Primary | ICD-10-CM

## 2024-12-03 DIAGNOSIS — R30.0 DYSURIA: ICD-10-CM

## 2024-12-05 ENCOUNTER — RESULTS FOLLOW-UP (OUTPATIENT)
Dept: OBGYN CLINIC | Facility: MEDICAL CENTER | Age: 29
End: 2024-12-05

## 2024-12-08 ENCOUNTER — OFFICE VISIT (OUTPATIENT)
Dept: URGENT CARE | Facility: CLINIC | Age: 29
End: 2024-12-08
Payer: COMMERCIAL

## 2024-12-08 VITALS
RESPIRATION RATE: 16 BRPM | OXYGEN SATURATION: 98 % | DIASTOLIC BLOOD PRESSURE: 68 MMHG | HEART RATE: 97 BPM | SYSTOLIC BLOOD PRESSURE: 106 MMHG | TEMPERATURE: 97 F

## 2024-12-08 DIAGNOSIS — J02.9 SORE THROAT: Primary | ICD-10-CM

## 2024-12-08 DIAGNOSIS — J02.9 VIRAL PHARYNGITIS: ICD-10-CM

## 2024-12-08 LAB — S PYO AG THROAT QL: NEGATIVE

## 2024-12-08 PROCEDURE — 87880 STREP A ASSAY W/OPTIC: CPT | Performed by: NURSE PRACTITIONER

## 2024-12-08 PROCEDURE — G0382 LEV 3 HOSP TYPE B ED VISIT: HCPCS | Performed by: NURSE PRACTITIONER

## 2024-12-08 NOTE — PROGRESS NOTES
"NAME: Ailyn Muro is a 29 y.o. female  : 1995    MRN: [id number]    /68   Pulse 97   Temp (!) 97 °F (36.1 °C) (Tympanic)   Resp 16   LMP 2024 (Exact Date)   SpO2 98%     9:59 AM    Assessment and Plan   Sore throat [J02.9]  1. Sore throat  POCT rapid ANTIGEN strepA    Throat culture    Throat culture      2. Viral pharyngitis            Ailyn was seen today for sore throat.    Diagnoses and all orders for this visit:    Sore throat  -     POCT rapid ANTIGEN strepA  -     Throat culture; Future  -     Throat culture    Viral pharyngitis        Patient Instructions     Patient Instructions   Take meds as directed for symptoms  If worsening symptoms follow up with pcp or ob/gyn  No bacterial infection noted today,   Rapid strep was negative, sent to lab for further testing    Safe meds when pregnant.     Colds/Sore Throat   · Robitussin DM® - Plain (guaifenesin)   · Saline nasal spray   · Warm salt water gargle   · Cepacol® throat lozenges or mouthwash (cetylpyridinium chloride)   · Sucrets® (hexylresoricinol, dyclonone HC1)   · Sudafed® (pseudoephedrine, phenylephnine reformulated) for colds or congestion. May take after 13 weeks of pregnancy.     Allergy Avoid the \"D\" - or Decongestant   · Claritin® (loratadine)   · Zrytec® (cetirizine)   · Allegra® (fexofenadine)     Headache /Aches and Pains   · Tylenol® (acetaminophen)   Do not exceed more than 3000 mg of Tylenol in a 24-hour period.   Headache prevention:   · Riboflavin 400mg daily (preventative)  · Magnesium 500mg daily (preventative)      Heartburn   · Mylanta® (aluminum hydroxide/simethicone, magnesium hydroxide)   · Maalox® (aluminum magnesium hydroxide, magnesium hydroxide)   · Tums® (calcium carbonate)   · Riopan® (magaldrate)     Constipation   · Colace® (docusate sodium)   · Surfak® (docusate calcium)   · MiraLAX®   · Glycerin suppositories   · Fleets® enema (sodium phosphate &sodium biphosphate)   Avoid enemas in the third " trimester if you have had  labor contractions.     Nausea /Vomiting   · Vitamin B6 (pyridoxine) - May take 50 mg at bedtime, 25 mg in the am, 25 mg in the afternoon   · Unisom® (doxylamine) - May use for nausea/vomiting - take 12.5 mg every 8 hours as needed (cut a 25 mg tablet in half). May cause drowsiness.   · Ginger capsules daily - Total 1000 mg daily     Sleep   · Benadryl® (diphenhydramine) - Take 1-2 tablets as needed at bedtime   · Unisom® (doxylamine) 25 mg tablet - As needed at bedtime   · Melatonin 5 mg tablet - As needed at bedtime     Generally the generic form of medicine is usually lower priced than the brand name form of the medicine.     [facility]  [facility] ([email or web address])      Proceed to the nearest ER if symptoms worsen, Follow up with your PCP  Continue to social distance, wash your hands, and wear your masks. Please continue to follow the CDC.gov guidelines daily for they are subject to change on COVID-19    Chief Complaint     Chief Complaint   Patient presents with    Sore Throat     Sore throat since MONDAY. Pt reports slight nasal congestion and BL ear fullness. Pts son has a cough & pt works with students with strep. No fevers.          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 She has no fever today. She has sore throat today as well and is 25 weeks pregnant. Printed out list of meds for patient she is able to take while pregnant and given to pt.             Review of Systems   Review of Systems   Constitutional:  Negative for chills, diaphoresis, fatigue and fever.   HENT:  Positive for congestion, ear pain and sore throat. Negative for postnasal drip, rhinorrhea, sinus pressure, sinus pain and sneezing.    Eyes: Negative.    Respiratory:  Negative for cough and chest tightness.    Cardiovascular: Negative.    Gastrointestinal:  Negative.         25 weeks pregnant with her 2nd child, feels baby move and has prenatal care,    Genitourinary: Negative.          Current Medications       Current Outpatient Medications:     Prenatal Vit-Fe Fumarate-FA (PRENATAL VITAMIN PO), Take by mouth, Disp: , Rfl:     Current Allergies     Allergies as of 2024 - Reviewed 2024   Allergen Reaction Noted    Clindamycin Throat Swelling     Sumatriptan Nausea Only and Throat Swelling 2016    Azithromycin GI Intolerance and Vomiting 2022    Penicillins Rash 2012              Past Medical History:   Diagnosis Date    Common wart     Resolved 2016     Ganglion cyst     Resolved 2016     Migraine with aura     Resolved 2016     Varicella     vaccine       Past Surgical History:   Procedure Laterality Date    DENTAL SURGERY      [facility]  DELIVERY ONLY N/A 2022    Procedure:  SECTION ();  Surgeon: Yandy Velasquez MD;  Location: [facility] ;  Service: Obstetrics       Family History   Problem Relation Age of Onset    Arthritis Mother     Deep vein thrombosis Father     No Known Problems Sister     Colon cancer Maternal Grandmother     Prostate cancer Maternal Grandfather     Skin cancer Maternal Grandfather     Hypertension Maternal Grandfather     Cancer Maternal Grandfather         bladder cancer     Ovarian cancer Paternal Grandmother     No Known Problems Paternal Grandfather     Breast cancer Maternal Aunt         unsure age          Medications have been verified.    The following portions of the patient's history were reviewed and updated as appropriate: allergies, current medications, past family history, past medical history, past social history, past surgical history and problem list.    Objective   /68   Pulse 97   Temp (!) 97 °F (36.1 °C) (Tympanic)   Resp 16   LMP 2024 (Exact Date)   SpO2 98%      Physical Exam     Physical Exam  Constitutional:       Appearance: She is  "well-developed and normal weight. She is not ill-appearing, toxic-appearing or diaphoretic.   HENT:      Head: Normocephalic.      Right Ear: Hearing, tympanic membrane, ear canal and external ear normal. No drainage or tenderness.      Left Ear: Hearing, tympanic membrane, ear canal and external ear normal. No drainage, swelling or tenderness.      Nose: Mucosal edema present. No congestion or rhinorrhea.      Mouth/Throat:      Mouth: Mucous membranes are moist.      Pharynx: Uvula midline. No posterior oropharyngeal erythema.      Tonsils: No tonsillar exudate. 0 on the right. 0 on the left.   Eyes:      Conjunctiva/sclera: Conjunctivae normal.      Pupils: Pupils are equal, round, and reactive to light.   Neck:      Thyroid: No thyroid mass.   Cardiovascular:      Rate and Rhythm: Normal rate and regular rhythm.      Heart sounds: Normal heart sounds.   Pulmonary:      Effort: Pulmonary effort is normal.      Breath sounds: Normal breath sounds.   Abdominal:      Comments: Pregnant abdomen, 25 weeks, no c/o pain, per pt baby moving   Musculoskeletal:      Cervical back: Normal range of motion. No erythema.   Skin:     Capillary Refill: Capillary refill takes less than 2 seconds.   Neurological:      General: No focal deficit present.      Mental Status: She is alert.   Psychiatric:         Behavior: Behavior is cooperative.               Note: Portions of this record may have been created with voice recognition software. Occasional wrong word or \"sound a like\" substitutions may have occurred due to the inherent limitations of voice recognition software. Please read the chart carefully and recognize, using context, where substitutions have occurred.*    DANIEL Nelson  "

## 2024-12-08 NOTE — PATIENT INSTRUCTIONS
"Take meds as directed for symptoms  If worsening symptoms follow up with pcp or ob/gyn  No bacterial infection noted today,   Rapid strep was negative, sent to lab for further testing    Safe meds when pregnant.     Colds/Sore Throat   · Robitussin DM® - Plain (guaifenesin)   · Saline nasal spray   · Warm salt water gargle   · Cepacol® throat lozenges or mouthwash (cetylpyridinium chloride)   · Sucrets® (hexylresoricinol, dyclonone HC1)   · Sudafed® (pseudoephedrine, phenylephnine reformulated) for colds or congestion. May take after 13 weeks of pregnancy.     Allergy Avoid the \"D\" - or Decongestant   · Claritin® (loratadine)   · Zrytec® (cetirizine)   · Allegra® (fexofenadine)     Headache /Aches and Pains   · Tylenol® (acetaminophen)   Do not exceed more than 3000 mg of Tylenol in a 24-hour period.   Headache prevention:   · Riboflavin 400mg daily (preventative)  · Magnesium 500mg daily (preventative)      Heartburn   · Mylanta® (aluminum hydroxide/simethicone, magnesium hydroxide)   · Maalox® (aluminum magnesium hydroxide, magnesium hydroxide)   · Tums® (calcium carbonate)   · Riopan® (magaldrate)     Constipation   · Colace® (docusate sodium)   · Surfak® (docusate calcium)   · MiraLAX®   · Glycerin suppositories   · Fleets® enema (sodium phosphate &sodium biphosphate)   Avoid enemas in the third trimester if you have had  labor contractions.     Nausea /Vomiting   · Vitamin B6 (pyridoxine) - May take 50 mg at bedtime, 25 mg in the am, 25 mg in the afternoon   · Unisom® (doxylamine) - May use for nausea/vomiting - take 12.5 mg every 8 hours as needed (cut a 25 mg tablet in half). May cause drowsiness.   · Ginger capsules daily - Total 1000 mg daily     Sleep   · Benadryl® (diphenhydramine) - Take 1-2 tablets as needed at bedtime   · Unisom® (doxylamine) 25 mg tablet - As needed at bedtime   · Melatonin 5 mg tablet - As needed at bedtime     Generally the generic form of medicine is usually lower priced " than the brand name form of the medicine.     [facility] Pregnancy Essentials Guide  [facility] ([email or web address].org)

## 2024-12-11 PROBLEM — N89.8 VAGINAL DISCHARGE: Status: RESOLVED | Noted: 2024-11-21 | Resolved: 2024-12-11

## 2024-12-11 NOTE — ASSESSMENT & PLAN NOTE
Orders:    CBC and differential; Future    Glucose, 1H PG; Future    RPR-Syphilis Screening (Total Syphilis IGG/IGM); Future    Ferritin; Future      Anesthesia Evaluation     Patient summary reviewed and Nursing notes reviewed   no history of anesthetic complications:  NPO Solid Status: > 8 hours  NPO Liquid Status: > 8 hours           Airway   Mallampati: I  TM distance: <3 FB  Neck ROM: full  no difficulty expected  Dental - normal exam     Pulmonary - negative pulmonary ROS and normal exam   Cardiovascular - negative cardio ROS and normal exam        Neuro/Psych- negative ROS  GI/Hepatic/Renal/Endo    (+) morbid obesity, GERD,      Musculoskeletal     Abdominal  - normal exam    Bowel sounds: normal.   Substance History - negative use     OB/GYN negative ob/gyn ROS         Other   (+) arthritis                   Anesthesia Plan    ASA 3     general     intravenous induction   Anesthetic plan and risks discussed with patient.

## 2024-12-11 NOTE — ASSESSMENT & PLAN NOTE
History of classical  section for  delivery.  Reviewed recommendation for repeat at 37 gestational weeks.

## 2024-12-11 NOTE — PROGRESS NOTES
Name: Ailyn Muro      : 1995      MRN: [id number]  Encounter Provider: DANIEL Herbert  Encounter Date: 2024   Encounter department: [facility]  :  Assessment & Plan  Anxiety during pregnancy  Encouraged to call office with worsening symptoms, thoughts of self-harm or harm to others       H/O  section complicating pregnancy  History of classical  section for  delivery.  Reviewed recommendation for repeat at 37 gestational weeks.       History of  delivery, currently pregnant    Orders:    CBC and differential; Future    Glucose, 1H PG; Future    RPR-Syphilis Screening (Total Syphilis IGG/IGM); Future    Ferritin; Future    26 weeks gestation of pregnancy  Continue prenatal vitamins daily  Follow-up with  center scheduled 24  Common discomforts of pregnancy and precautions including  labor reviewed.  Signs and symptoms to report reviewed.  Orders:    CBC and differential; Future    Glucose, 1H PG; Future    RPR-Syphilis Screening (Total Syphilis IGG/IGM); Future    Ferritin; Future    Molecular Vaginal Panel    UA w Reflex to Microscopic w Reflex to Culture    Urine culture    Burning with urination  Will call / [facility] sc[facility]cet message with results  Orders:    Molecular Vaginal Panel    UA w Reflex to Microscopic w Reflex to Culture    Urine culture      RTO 2 weeks f/u labs     History of Present Illness   HPI  Ailyn Muro is a 29 y.o. female who presents for PN visit.   Denies loss of fluid, vaginal bleeding and abdominal pain.  Confirms frequent fetal movement.  Tolerating prenatal vitamin well.  Patient is concerned with urinary frequency and burning with urination.  Urine culture 12/3/24-negative for infection.  Patient states she feels she has incomplete emptying of bladder and burning.  Denies vaginal discharge, irritation or odor.  Symptoms have remained the same since onset.    History obtained from:  patient    Review of Systems   Constitutional:  Negative for chills and fever.   Respiratory: Negative.     Cardiovascular: Negative.    Genitourinary:  Positive for dysuria, frequency and urgency. Negative for decreased urine volume, difficulty urinating, dyspareunia, enuresis, flank pain, genital sores, hematuria, menstrual problem, pelvic pain, vaginal bleeding, vaginal discharge and vaginal pain.   Neurological:  Negative for headaches.     Medical History Reviewed by provider this encounter:  Allergies  Meds     .     Objective   /70   Wt 85.7 kg (189 lb)   LMP 06/14/2024 (Exact Date)   BMI 25.63 kg/m²      Physical Exam  Vitals reviewed.   Constitutional:       Appearance: Normal appearance.   Neurological:      Mental Status: She is alert and oriented to person, place, and time.   Psychiatric:         Mood and Affect: Mood normal.         Behavior: Behavior normal.

## 2024-12-13 ENCOUNTER — ROUTINE PRENATAL (OUTPATIENT)
Dept: OBGYN CLINIC | Facility: CLINIC | Age: 29
End: 2024-12-13

## 2024-12-13 VITALS — DIASTOLIC BLOOD PRESSURE: 70 MMHG | WEIGHT: 189 LBS | BODY MASS INDEX: 25.63 KG/M2 | SYSTOLIC BLOOD PRESSURE: 122 MMHG

## 2024-12-13 DIAGNOSIS — F41.9 ANXIETY DURING PREGNANCY: Primary | ICD-10-CM

## 2024-12-13 DIAGNOSIS — O09.899 HISTORY OF PRETERM DELIVERY, CURRENTLY PREGNANT: ICD-10-CM

## 2024-12-13 DIAGNOSIS — O99.340 ANXIETY DURING PREGNANCY: Primary | ICD-10-CM

## 2024-12-13 DIAGNOSIS — Z3A.26 26 WEEKS GESTATION OF PREGNANCY: ICD-10-CM

## 2024-12-13 DIAGNOSIS — O34.219 H/O CESAREAN SECTION COMPLICATING PREGNANCY: ICD-10-CM

## 2024-12-13 DIAGNOSIS — R30.0 BURNING WITH URINATION: ICD-10-CM

## 2024-12-13 PROCEDURE — 81514 NFCT DS BV&VAGINITIS DNA ALG: CPT | Performed by: NURSE PRACTITIONER

## 2024-12-13 PROCEDURE — PNV: Performed by: NURSE PRACTITIONER

## 2024-12-13 PROCEDURE — 87086 URINE CULTURE/COLONY COUNT: CPT | Performed by: NURSE PRACTITIONER

## 2024-12-14 LAB
C GLABRATA DNA VAG QL NAA+PROBE: NEGATIVE
C KRUSEI DNA VAG QL NAA+PROBE: NEGATIVE
CANDIDA SP 6 PNL VAG NAA+PROBE: NEGATIVE
T VAGINALIS DNA VAG QL NAA+PROBE: NEGATIVE
VAGINOSIS/ITIS DNA PNL VAG PROBE+SIG AMP: NEGATIVE

## 2024-12-15 LAB
BACTERIA UR CULT: ABNORMAL
BACTERIA UR CULT: ABNORMAL

## 2024-12-16 ENCOUNTER — RESULTS FOLLOW-UP (OUTPATIENT)
Dept: OBGYN CLINIC | Facility: CLINIC | Age: 29
End: 2024-12-16

## 2024-12-21 LAB
BASOPHILS # BLD AUTO: 11 CELLS/UL (ref 0–200)
BASOPHILS NFR BLD AUTO: 0.1 %
EOSINOPHIL # BLD AUTO: 45 CELLS/UL (ref 15–500)
EOSINOPHIL NFR BLD AUTO: 0.4 %
ERYTHROCYTE [DISTWIDTH] IN BLOOD BY AUTOMATED COUNT: 12.8 % (ref 11–15)
FERRITIN SERPL-MCNC: 9 NG/ML (ref 16–154)
GLUCOSE 1H P 50 G GLC PO SERPL-MCNC: 137 MG/DL
HCT VFR BLD AUTO: 32.6 % (ref 35–45)
HGB BLD-MCNC: 11.1 G/DL (ref 11.7–15.5)
LYMPHOCYTES # BLD AUTO: 2418 CELLS/UL (ref 850–3900)
LYMPHOCYTES NFR BLD AUTO: 21.4 %
MCH RBC QN AUTO: 31.2 PG (ref 27–33)
MCHC RBC AUTO-ENTMCNC: 34 G/DL (ref 32–36)
MCV RBC AUTO: 91.6 FL (ref 80–100)
MONOCYTES # BLD AUTO: 791 CELLS/UL (ref 200–950)
MONOCYTES NFR BLD AUTO: 7 %
NEUTROPHILS # BLD AUTO: 8034 CELLS/UL (ref 1500–7800)
NEUTROPHILS NFR BLD AUTO: 71.1 %
PLATELET # BLD AUTO: 234 THOUSAND/UL (ref 140–400)
PMV BLD REES-ECKER: 10.9 FL (ref 7.5–12.5)
RBC # BLD AUTO: 3.56 MILLION/UL (ref 3.8–5.1)
WBC # BLD AUTO: 11.3 THOUSAND/UL (ref 3.8–10.8)

## 2024-12-22 DIAGNOSIS — O99.810 ABNORMAL GLUCOSE TOLERANCE TEST (GTT) DURING PREGNANCY, ANTEPARTUM: Primary | ICD-10-CM

## 2024-12-27 ENCOUNTER — RESULTS FOLLOW-UP (OUTPATIENT)
Dept: OBGYN CLINIC | Facility: CLINIC | Age: 29
End: 2024-12-27

## 2024-12-27 ENCOUNTER — ROUTINE PRENATAL (OUTPATIENT)
Dept: OBGYN CLINIC | Facility: MEDICAL CENTER | Age: 29
End: 2024-12-27
Payer: COMMERCIAL

## 2024-12-27 VITALS — WEIGHT: 191 LBS | DIASTOLIC BLOOD PRESSURE: 62 MMHG | SYSTOLIC BLOOD PRESSURE: 110 MMHG | BODY MASS INDEX: 25.9 KG/M2

## 2024-12-27 DIAGNOSIS — Z98.891 HISTORY OF CLASSICAL CESAREAN SECTION: ICD-10-CM

## 2024-12-27 DIAGNOSIS — Z3A.28 28 WEEKS GESTATION OF PREGNANCY: Primary | ICD-10-CM

## 2024-12-27 DIAGNOSIS — O09.899 HISTORY OF PRETERM DELIVERY, CURRENTLY PREGNANT: ICD-10-CM

## 2024-12-27 DIAGNOSIS — Z23 ENCOUNTER FOR IMMUNIZATION: ICD-10-CM

## 2024-12-27 DIAGNOSIS — O99.810 ABNORMAL GLUCOSE TOLERANCE TEST (GTT) DURING PREGNANCY, ANTEPARTUM: ICD-10-CM

## 2024-12-27 PROBLEM — O34.219 H/O CESAREAN SECTION COMPLICATING PREGNANCY: Status: RESOLVED | Noted: 2022-03-28 | Resolved: 2024-12-27

## 2024-12-27 LAB
DME PARACHUTE DELIVERY DATE REQUESTED: NORMAL
DME PARACHUTE ITEM DESCRIPTION: NORMAL
DME PARACHUTE ORDER STATUS: NORMAL
DME PARACHUTE SUPPLIER NAME: NORMAL
DME PARACHUTE SUPPLIER PHONE: NORMAL
GLUCOSE 1H P CHAL SERPL-MCNC: 128 MG/DL
GLUCOSE 2H P CHAL SERPL-MCNC: 130 MG/DL
GLUCOSE 3H P 100 G GLC PO SERPL-MCNC: 97 MG/DL
GLUCOSE P FAST SERPL-MCNC: 76 MG/DL (ref 65–94)

## 2024-12-27 PROCEDURE — PNV: Performed by: OBSTETRICS & GYNECOLOGY

## 2024-12-27 PROCEDURE — 90715 TDAP VACCINE 7 YRS/> IM: CPT | Performed by: OBSTETRICS & GYNECOLOGY

## 2024-12-27 PROCEDURE — 90471 IMMUNIZATION ADMIN: CPT | Performed by: OBSTETRICS & GYNECOLOGY

## 2024-12-27 NOTE — PROGRESS NOTES
Routine Prenatal Visit  [facility] [address], [place], [place]      OB/GYN Prenatal Visit    ASSESSMENT / PLAN:  1. 28 weeks gestation of pregnancy  Assessment & Plan:  28 week labs  The CBC is 11.3 low ferritin   2. Abnormal glucose tolerance test (GTT) during pregnancy, antepartum  Assessment & Plan:  Failed 1 hour and passed the 3 hour testing    3. History of classical  section  Assessment & Plan:  PPROM at 28 weeks   Classical  C section at 29 week  Repeat c section at 36 weeks   4. History of  delivery, currently pregnant  5. Encounter for immunization  -     Tdap Vaccine greater than or equal to 6yo        SUBJECTIVE:  Ailyn Muro is a 29 y.o.  at 29 here for prenatal visit.  She has no obstetric complaints and denies pelvic pain, cramping/contractions, vaginal bleeding, loss of fluid, or decreased fetal movement.       The following portions of the patient's history were reviewed and updated as appropriate: allergies, current medications, past family history, past medical history, obstetric history, gynecologic history, past social history, past surgical history and problem list.      Objective:  /62   Wt 86.6 kg (191 lb)   LMP 2024 (Exact Date)   BMI 25.90 kg/m²   Pregravid Weight/BMI: 82.6 kg (182 lb) (BMI 24.68)  Current Weight: 86.6 kg (191 lb)   Total Weight Gain: 4.082 kg (9 lb)   Pre- Vitals      Flowsheet Row Most Recent Value   Prenatal Assessment    Fetal Heart Rate 153   Movement Present   Prenatal Vitals    Blood Pressure 110/62   Weight - Scale 86.6 kg (191 lb)   Urine Albumin/Glucose    Dilation/Effacement/Station    Vaginal Drainage    Draining Fluid No   Edema    LLE Edema None   RLE Edema None   Facial Edema None               Physical Exam:    General: Well appearing, no distress  Respiratory: Unlabored breathing  Cardiovascular: Regular rate.  Abdomen: Soft, gravid, nontender  Fundal Height: Appropriate for  gestational age.  Extremities: Warm and well perfused.  Non tender.      Lamonte Oglesby MD

## 2025-01-02 LAB
DME PARACHUTE DELIVERY DATE ACTUAL: NORMAL
DME PARACHUTE DELIVERY DATE REQUESTED: NORMAL
DME PARACHUTE ITEM DESCRIPTION: NORMAL
DME PARACHUTE ORDER STATUS: NORMAL
DME PARACHUTE SUPPLIER NAME: NORMAL
DME PARACHUTE SUPPLIER PHONE: NORMAL

## 2025-01-08 PROBLEM — Z3A.29 29 WEEKS GESTATION OF PREGNANCY: Status: ACTIVE | Noted: 2024-12-27

## 2025-01-08 NOTE — ASSESSMENT & PLAN NOTE
Continue PNV daily   Continue FKC daily   Follow up with [facility] 1/30/25  Common discomforts of pregnancy and precautions including PTL reviewed

## 2025-01-08 NOTE — ASSESSMENT & PLAN NOTE
Repeat recommended 36-37 gestational weeks   Message sent to nurse navigator regarding scheduling

## 2025-01-08 NOTE — PROGRESS NOTES
Name: Ailyn Muro      : 1995      MRN: [id number]  Encounter Provider: DANIEL Herbert  Encounter Date: 1/10/2025   Encounter department: [facility]  :  Assessment & Plan  Anxiety during pregnancy         History of classical  section  Repeat recommended 36-37 gestational weeks   Message sent to nurse navigator regarding scheduling         History of  delivery, currently pregnant         29 weeks gestation of pregnancy  Continue PNV daily   Continue FKC daily   Follow up with PNC 25  Common discomforts of pregnancy and precautions including PTL reviewed         Abnormal glucose tolerance test (GTT) during pregnancy, antepartum  3 hour GTT - WNL          RTO 2 weeks     History of Present Illness   HPI  Ailyn Muro is a 29 y.o. female who presents for PN visit      Denies loss of fluid, vaginal bleeding and abdominal pain. Confirms frequent fetal movement. Doing fetal kick counts . Tolerating PNV well       History obtained from: patient    Review of Systems   Constitutional:  Negative for chills and fever.   Respiratory: Negative.     Cardiovascular: Negative.      Medical History Reviewed by provider this encounter:  Allergies  Meds     .     Objective   LMP 2024 (Exact Date)      Physical Exam  Vitals reviewed.   Constitutional:       Appearance: Normal appearance.   Neurological:      Mental Status: She is alert and oriented to person, place, and time.   Psychiatric:         Mood and Affect: Mood normal.         Behavior: Behavior normal.

## 2025-01-10 ENCOUNTER — ROUTINE PRENATAL (OUTPATIENT)
Dept: OBGYN CLINIC | Facility: CLINIC | Age: 30
End: 2025-01-10

## 2025-01-10 VITALS — SYSTOLIC BLOOD PRESSURE: 104 MMHG | WEIGHT: 192 LBS | BODY MASS INDEX: 26.04 KG/M2 | DIASTOLIC BLOOD PRESSURE: 72 MMHG

## 2025-01-10 DIAGNOSIS — F41.9 ANXIETY DURING PREGNANCY: Primary | ICD-10-CM

## 2025-01-10 DIAGNOSIS — O09.899 HISTORY OF PRETERM DELIVERY, CURRENTLY PREGNANT: ICD-10-CM

## 2025-01-10 DIAGNOSIS — Z3A.29 29 WEEKS GESTATION OF PREGNANCY: ICD-10-CM

## 2025-01-10 DIAGNOSIS — Z98.891 HISTORY OF CLASSICAL CESAREAN SECTION: ICD-10-CM

## 2025-01-10 DIAGNOSIS — O99.340 ANXIETY DURING PREGNANCY: Primary | ICD-10-CM

## 2025-01-10 DIAGNOSIS — O99.810 ABNORMAL GLUCOSE TOLERANCE TEST (GTT) DURING PREGNANCY, ANTEPARTUM: ICD-10-CM

## 2025-01-10 PROCEDURE — PNV: Performed by: NURSE PRACTITIONER

## 2025-01-12 ENCOUNTER — NURSE TRIAGE (OUTPATIENT)
Dept: OTHER | Facility: OTHER | Age: 30
End: 2025-01-12

## 2025-01-12 NOTE — TELEPHONE ENCOUNTER
"Reason for Disposition   [1] Blurred vision or visual changes AND [2] present now AND [3] sudden onset or new (e.g., minutes, hours, days)    Answer Assessment - Initial Assessment Questions  1. DESCRIPTION: \"How has your vision changed?\" (e.g., complete vision loss, blurred vision, double vision, floaters, etc.)        Blurred vision     2. LOCATION: \"One or both eyes?\" If one, ask: \"Which eye?\"        Left eye    3. SEVERITY: \"Can you see anything?\" If Yes, ask: \"What can you see?\" (e.g., fine print)      A cloud of blurriness. Can not see fine print on phone    4. ONSET: \"When did this begin?\" \"Did it start suddenly (minutes, hours) or has this been gradual (weeks, months)?\"        1/12    5. PATTERN: \"Does this come and go, or has it been constant since it started?\"        Constant     6. PAIN: \"Is there any pain in your eye(s)?\"  (Scale 1-10; or mild, moderate, severe)        No    7. CONTACTS-GLASSES: \"Do you wear contacts or glasses?\"      Not wearing anything now    8. CAUSE: \"What do you think is causing this visual problem?\"        Unsure    9. OTHER SYMPTOMS: \"Do you have any other symptoms?\" (e.g., arm or leg weakness, dry eyes, headache)      Denies    10. PREGNANCY: \"How many weeks pregnant are you?\"          30w2d    11. GUIDO: \"What date are you expecting to deliver?\"          3/21/25    Protocols used: Pregnancy - Vision Loss or Change-Adult-    Hx of migraine with aura    ESC message sent to on call OB provider. Provider stated sometimes visual symptoms can precede a migraine. Monitor at home to see if vision improves vs progress to a migraine. Encourage hydration and rest, avoid bright lights. Informed patient to call back with worsening symptoms. She verbalized understanding.   "

## 2025-01-14 ENCOUNTER — TELEPHONE (OUTPATIENT)
Dept: OBGYN CLINIC | Facility: MEDICAL CENTER | Age: 30
End: 2025-01-14

## 2025-01-14 NOTE — TELEPHONE ENCOUNTER
Discussed dates with patient. C/S scheduled for  at 0730 with Dr. Oglesby at [facility].     ----- Message from DANIEL Connelly sent at 1/10/2025  3:30 PM EST -----  Procedure to be scheduled (IOL or CS): R C/s     GUIDO: Estimated Date of Delivery: 3/21/25     Indication for delivery: Prior classical  section/    Requested date (s) of delivery: -   If requested date is unavailable, is there a date by which the pt must be delivered?    Physician preference: BRANDIN, DUSTIN or Buddy if possible     Cervical Exam Not evaluated.    If IOL, anticipated method: na    AM or PM IOL? NA    If CS, with or without tubal: without     TY

## 2025-01-14 NOTE — TELEPHONE ENCOUNTER
3RD TRIMESTER CHECK-IN CALL      Overall how are you feeling? Patient reports to be doing well overall.      Compliant with routine OB appointments? Yes.     Have you completed your 3rd trimester lab work? Yes.     Have you reviewed the contents of 3rd trimester folder from office? Yes.                Have you decided on a pediatrician? [facility].                            Questions on paperwork to go back to office? Not at this time.   Questions on the baby birth certificate forms? Not at this time.

## 2025-01-24 ENCOUNTER — TELEPHONE (OUTPATIENT)
Dept: OBGYN CLINIC | Facility: MEDICAL CENTER | Age: 30
End: 2025-01-24

## 2025-01-24 ENCOUNTER — ROUTINE PRENATAL (OUTPATIENT)
Dept: OBGYN CLINIC | Facility: MEDICAL CENTER | Age: 30
End: 2025-01-24

## 2025-01-24 VITALS — BODY MASS INDEX: 26.94 KG/M2 | SYSTOLIC BLOOD PRESSURE: 114 MMHG | WEIGHT: 198.6 LBS | DIASTOLIC BLOOD PRESSURE: 72 MMHG

## 2025-01-24 DIAGNOSIS — Z3A.32 32 WEEKS GESTATION OF PREGNANCY: ICD-10-CM

## 2025-01-24 DIAGNOSIS — F41.9 ANXIETY: ICD-10-CM

## 2025-01-24 DIAGNOSIS — Z98.891 HISTORY OF CLASSICAL CESAREAN SECTION: ICD-10-CM

## 2025-01-24 DIAGNOSIS — Z34.83 ENCOUNTER FOR SUPERVISION OF OTHER NORMAL PREGNANCY IN THIRD TRIMESTER: Primary | ICD-10-CM

## 2025-01-24 PROCEDURE — PNV: Performed by: OBSTETRICS & GYNECOLOGY

## 2025-01-24 RX ORDER — HYDROXYZINE HYDROCHLORIDE 25 MG/1
25 TABLET, FILM COATED ORAL EVERY 6 HOURS PRN
Qty: 30 TABLET | Refills: 3 | Status: SHIPPED | OUTPATIENT
Start: 2025-01-24

## 2025-01-24 NOTE — TELEPHONE ENCOUNTER
3RD TRIMESTER CHECK-IN     Overall how are you feeling? Patient reports to be doing well overall. Feels a bit anxious about upcoming c/s due to previous history. Reassured patient and discussed how the day of the procedure will go. Provider made aware of patient's feelings. Will have conversation regarding medication options to easy her anxiety.     Compliant with routine OB appointments? Yes.     Have you completed your 3rd trimester lab work? Yes. Passed 3 hour GTT.     Have you reviewed the contents of 3rd trimester folder from office? Yes. Has not filled out paperwork. Patient aware to reach out with any questions.                 Have you decided on a pediatrician?  [facility]                            Questions on paperwork to go back to office? Not at this time.  Questions on the baby birth certificate forms? Not at this time.

## 2025-01-24 NOTE — PROGRESS NOTES
Routine Prenatal Visit  [facility] [address], [place], [place]    Assessment/Plan:  Ailyn is a 29 y.o. year old  at 32w0d who presents for routine prenatal visit.     1. Encounter for supervision of other normal pregnancy in third trimester  2. History of classical  section  3. Anxiety  -     hydrOXYzine HCL (ATARAX) 25 mg tablet; Take 1 tablet (25 mg total) by mouth every 6 (six) hours as needed for itching        Subjective:     CC: Prenatal care    Ailyn Muro is a 29 y.o.  female who presents for routine prenatal care at 32w0d.  Pregnancy ROS: *** leakage of fluid, pelvic pain, or vaginal bleeding.  *** fetal movement.    The following portions of the patient's history were reviewed and updated as appropriate: allergies, current medications, past family history, past medical history, obstetric history, gynecologic history, past social history, past surgical history and problem list.      Objective:  /72   Wt 90.1 kg (198 lb 9.6 oz)   LMP 2024 (Exact Date)   BMI 26.94 kg/m²   Pregravid Weight/BMI: 82.6 kg (182 lb) (BMI 24.68)  Current Weight: 90.1 kg (198 lb 9.6 oz)   Total Weight Gain: 7.53 kg (16 lb 9.6 oz)   Pre- Vitals      Flowsheet Row Most Recent Value   Prenatal Assessment    Fetal Heart Rate 138   Movement Present   Prenatal Vitals    Blood Pressure 114/72   Weight - Scale 90.1 kg (198 lb 9.6 oz)   Urine Albumin/Glucose    Dilation/Effacement/Station    Vaginal Drainage    Draining Fluid No   Edema    LLE Edema None   RLE Edema None   Facial Edema None             General: Well appearing, no distress  Respiratory: Unlabored breathing  Cardiovascular: Regular rate.  Abdomen: Soft, gravid, nontender  Fundal Height: Appropriate for gestational age.  Extremities: Warm and well perfused.  Non tender.

## 2025-01-24 NOTE — PROGRESS NOTES
Routine Prenatal Visit  [facility] [address], [place], [place]    Assessment/Plan:  Ailyn is a 29 y.o. year old  at 32w0d who presents for routine prenatal visit.     1. Encounter for supervision of other normal pregnancy in third trimester  2. History of classical  section  3. Anxiety  -     hydrOXYzine HCL (ATARAX) 25 mg tablet; Take 1 tablet (25 mg total) by mouth every 6 (six) hours as needed for itching  4. 32 weeks gestation of pregnancy        Subjective:     CC: Prenatal care    Ailyn Muro is a 29 y.o.  female who presents for routine prenatal care at 32w0d.  Pregnancy ROS: no leakage of fluid, pelvic pain, or vaginal bleeding.  good fetal movement.  GBS at next visit  Anxious about upcoming delivery, atarax prescribed   The following portions of the patient's history were reviewed and updated as appropriate: allergies, current medications, past family history, past medical history, obstetric history, gynecologic history, past social history, past surgical history and problem list.      Objective:  /72   Wt 90.1 kg (198 lb 9.6 oz)   LMP 2024 (Exact Date)   BMI 26.94 kg/m²   Pregravid Weight/BMI: 82.6 kg (182 lb) (BMI 24.68)  Current Weight: 90.1 kg (198 lb 9.6 oz)   Total Weight Gain: 7.53 kg (16 lb 9.6 oz)   Pre- Vitals      Flowsheet Row Most Recent Value   Prenatal Assessment    Fetal Heart Rate 138   Movement Present   Prenatal Vitals    Blood Pressure 114/72   Weight - Scale 90.1 kg (198 lb 9.6 oz)   Urine Albumin/Glucose    Dilation/Effacement/Station    Vaginal Drainage    Draining Fluid No   Edema    LLE Edema None   RLE Edema None   Facial Edema None             General: Well appearing, no distress  Respiratory: Unlabored breathing  Cardiovascular: Regular rate.  Abdomen: Soft, gravid, nontender  Fundal Height: Appropriate for gestational age.  Extremities: Warm and well perfused.  Non tender.

## 2025-01-29 ENCOUNTER — OFFICE VISIT (OUTPATIENT)
Age: 30
End: 2025-01-29
Payer: COMMERCIAL

## 2025-01-29 VITALS
WEIGHT: 202.4 LBS | HEIGHT: 72 IN | DIASTOLIC BLOOD PRESSURE: 72 MMHG | OXYGEN SATURATION: 97 % | TEMPERATURE: 97.4 F | SYSTOLIC BLOOD PRESSURE: 114 MMHG | BODY MASS INDEX: 27.41 KG/M2 | HEART RATE: 86 BPM

## 2025-01-29 DIAGNOSIS — K59.00 CONSTIPATION, UNSPECIFIED CONSTIPATION TYPE: ICD-10-CM

## 2025-01-29 DIAGNOSIS — K21.9 GASTROESOPHAGEAL REFLUX DISEASE, UNSPECIFIED WHETHER ESOPHAGITIS PRESENT: Primary | ICD-10-CM

## 2025-01-29 PROCEDURE — 99214 OFFICE O/P EST MOD 30 MIN: CPT | Performed by: DIETITIAN, REGISTERED

## 2025-01-29 RX ORDER — FAMOTIDINE 20 MG/1
20 TABLET, FILM COATED ORAL 2 TIMES DAILY PRN
Qty: 60 TABLET | Refills: 4 | Status: SHIPPED | OUTPATIENT
Start: 2025-01-29

## 2025-01-29 NOTE — PROGRESS NOTES
Name: Ailyn Muro      : 1995      MRN: [id number]  Encounter Provider: Lamonte Donato PA-C  Encounter Date: 2025   Encounter department: [facility]  :  Assessment & Plan  Gastroesophageal reflux disease, unspecified whether esophagitis present  Patient is currently 32 weeks pregnant and has recently noticed an increase in nocturnal heartburn as well as trapped gas in her esophagus and epigastrium.  She has been taking Pepcid at bedtime which is helpful, but she has not tried any medication yet for her trapped gas symptom.  Orders:  •  famotidine (PEPCID) 20 mg tablet; Take 1 tablet (20 mg total) by mouth 2 (two) times a day as needed for heartburn or indigestion  -Recommend increasing Pepcid to 20 mg BID PRN.    -If Pepcid does not help with trapped gas, would recommend a trial of simethicone.  -We discussed diaphragmatic breathing as a way to manage indigestion.  -After patient gives birth, would recommend EGD for further evaluation.  -Follow up in about 6 months, unless needed sooner.  Constipation, unspecified constipation type  Patient has had some worsening constipation throughout her pregnancy.  She has been taking MiraLAX as needed, which does work well when she takes it.  She has only had 2 episodes of mucus in the stool over the last several months and has not had any rectal bleeding.  She makes sure to stay very well hydrated.     -Advised patient to take MiraLAX 1 capful every day to prevent constipation  -Continue with excellent water intake      History of Present Illness   HPI  Ailyn Muro is a 29 y.o. female who presents for follow-up of     Patient is currently 32 weeks pregnant and has recently noticed an increase in nocturnal heartburn as well as trapped gas in her esophagus and epigastrium.  She has been taking Pepcid at bedtime which is helpful, but she has not tried any medication yet for her trapped gas symptom.  Patient has had  some worsening constipation throughout her pregnancy.  She has been taking MiraLAX as needed, which does work well when she takes it.  She has only had 2 episodes of mucus in the stool over the last several months and has not had any rectal bleeding.  She makes sure to stay very well hydrated.        Review of Systems   All other systems reviewed and are negative.         Objective   /72 (BP Location: Left arm, Patient Position: Sitting, Cuff Size: Standard)   Pulse 86   Temp (!) 97.4 °F (36.3 °C) (Tympanic)   Ht 6' (1.829 m)   Wt 91.8 kg (202 lb 6.4 oz)   LMP 06/14/2024 (Exact Date)   SpO2 97%   BMI 27.45 kg/m²      Physical Exam  Constitutional:       Appearance: Normal appearance.   HENT:      Head: Normocephalic and atraumatic.   Pulmonary:      Effort: Pulmonary effort is normal.   Abdominal:      Comments: Gravid    Skin:     Coloration: Skin is not jaundiced or pale.   Neurological:      Mental Status: She is alert.   Psychiatric:         Mood and Affect: Mood normal.         Behavior: Behavior normal.

## 2025-01-30 ENCOUNTER — ULTRASOUND (OUTPATIENT)
Dept: PERINATAL CARE | Facility: OTHER | Age: 30
End: 2025-01-30
Payer: COMMERCIAL

## 2025-01-30 VITALS
WEIGHT: 199.6 LBS | HEIGHT: 72 IN | HEART RATE: 65 BPM | SYSTOLIC BLOOD PRESSURE: 124 MMHG | DIASTOLIC BLOOD PRESSURE: 62 MMHG | BODY MASS INDEX: 27.04 KG/M2

## 2025-01-30 DIAGNOSIS — Z98.891 HISTORY OF CLASSICAL CESAREAN SECTION: ICD-10-CM

## 2025-01-30 DIAGNOSIS — O09.899 HISTORY OF PRETERM DELIVERY, CURRENTLY PREGNANT: Primary | ICD-10-CM

## 2025-01-30 DIAGNOSIS — Z3A.32 32 WEEKS GESTATION OF PREGNANCY: ICD-10-CM

## 2025-01-30 DIAGNOSIS — Z36.2 ENCOUNTER FOR FOLLOW-UP ULTRASOUND OF FETAL ANATOMY: ICD-10-CM

## 2025-01-30 DIAGNOSIS — Z36.89 ENCOUNTER FOR ULTRASOUND TO ASSESS FETAL GROWTH: ICD-10-CM

## 2025-01-30 PROCEDURE — 76816 OB US FOLLOW-UP PER FETUS: CPT | Performed by: OBSTETRICS & GYNECOLOGY

## 2025-01-30 PROCEDURE — 99212 OFFICE O/P EST SF 10 MIN: CPT | Performed by: OBSTETRICS & GYNECOLOGY

## 2025-01-30 NOTE — LETTER
2025     Monique Villa, DANIEL  [address]  [address]  [place] [address]    Patient: Ailyn Muro   YOB: 1995   Date of Visit: 2025       Dear Dr. Villa:    Thank you for referring Ailyn Muro to me for evaluation. Below are my notes for this consultation.    If you have questions, please do not hesitate to call me. I look forward to following your patient along with you.         Sincerely,        Danette Gan MD        CC: No Recipients    Danette Gan MD  2025  2:38 PM  Sign when Signing Visit  Ailyn Muro has no complaints today.  She reports regular fetal movements and does not report any problems.  She is here today at 32w6d for an ultrasound for fetal growth and missed anatomy.    Problem list:  History of a 29-week 5-day classical  after spontaneous  rupture membranes and  labor.  She reports she is scheduled for a repeat  at 37 weeks.    Ultrasound findings:  The ultrasound today shows normal interval fetal growth and fluid.  The prior missed anatomy of the spine and aortic arch are seen today and appear normal.  This completes the anatomical survey.    Pregnancy ultrasound has limitations and is unable to detect all forms of fetal congenital abnormalities.  The inaccuracy in the EFW can be off by 1 lb either way in the third trimester.    Follow up recommended:   No further follow-up ultrasounds are recommended at this time.    Pre visit time reviewing her records   5 minutes  Face to face time 5 minutes  Post visit time on documentation of note, updating her problem list, adding orders and prescriptions 5 minutes.  Procedures that were completed today were charged separately.   The level of decision making was low level complexity.    Danette Gan MD

## 2025-01-30 NOTE — PROGRESS NOTES
Ailyn Muro has no complaints today.  She reports regular fetal movements and does not report any problems.  She is here today at 32w6d for an ultrasound for fetal growth and missed anatomy.    Problem list:  History of a 29-week 5-day classical  after spontaneous  rupture membranes and  labor.  She reports she is scheduled for a repeat  at 37 weeks.    Ultrasound findings:  The ultrasound today shows normal interval fetal growth and fluid.  The prior missed anatomy of the spine and aortic arch are seen today and appear normal.  This completes the anatomical survey.    Pregnancy ultrasound has limitations and is unable to detect all forms of fetal congenital abnormalities.  The inaccuracy in the EFW can be off by 1 lb either way in the third trimester.    Follow up recommended:   No further follow-up ultrasounds are recommended at this time.    Pre visit time reviewing her records   5 minutes  Face to face time 5 minutes  Post visit time on documentation of note, updating her problem list, adding orders and prescriptions 5 minutes.  Procedures that were completed today were charged separately.   The level of decision making was low level complexity.    Danette Gan MD

## 2025-02-01 PROBLEM — Z3A.33 33 WEEKS GESTATION OF PREGNANCY: Status: ACTIVE | Noted: 2024-12-27

## 2025-02-01 NOTE — PROGRESS NOTES
Name: Ailyn Muro      : 1995      MRN: [id number]  Encounter Provider: DANIEL Herbert  Encounter Date: 2/3/2025   [facility]  :  Assessment & Plan  33 weeks gestation of pregnancy  Continue PNV daily   Continue FKC daily   Common discomforts of pregnancy and precautions including PTL reviewed         History of classical  section  Repeat recommended 36-37 gestational weeks   Repeat  scheduled 25  Preoperative instructions provided . Has chlorhexidine wash           Anxiety during pregnancy  Continue Hydroxyzine as needed        History of  delivery, currently pregnant  22 @ 29w 5d for PPROM        Abnormal glucose tolerance test (GTT) during pregnancy, antepartum  3 hour WNL         RTO 2 weeks     History of Present Illness   HPI  Ailyn Muro is a 29 y.o. female who presents PN visit      Denies loss of fluid, vaginal bleeding and abdominal pain. Confirms frequent fetal movement. Tolerating prenatal vitamin, Pepcid and hydroxyzine well.     History obtained from: patient    Review of Systems   Constitutional:  Negative for chills and fever.   Respiratory: Negative.     Cardiovascular: Negative.      Medical History Reviewed by provider this encounter:  Allergies  Meds     .     Objective   LMP 2024 (Exact Date)      Physical Exam  Constitutional:       Appearance: Normal appearance.   Neurological:      Mental Status: She is alert and oriented to person, place, and time.   Psychiatric:         Mood and Affect: Mood normal.         Behavior: Behavior normal.

## 2025-02-01 NOTE — ASSESSMENT & PLAN NOTE
Continue PNV daily   Continue FKC daily   Common discomforts of pregnancy and precautions including PTL reviewed

## 2025-02-01 NOTE — ASSESSMENT & PLAN NOTE
Repeat recommended 36-37 gestational weeks   Repeat  scheduled 25  Preoperative instructions provided . Has chlorhexidine wash

## 2025-02-03 ENCOUNTER — ROUTINE PRENATAL (OUTPATIENT)
Dept: OBGYN CLINIC | Facility: CLINIC | Age: 30
End: 2025-02-03

## 2025-02-03 VITALS — DIASTOLIC BLOOD PRESSURE: 68 MMHG | BODY MASS INDEX: 27.29 KG/M2 | SYSTOLIC BLOOD PRESSURE: 112 MMHG | WEIGHT: 201.2 LBS

## 2025-02-03 DIAGNOSIS — Z3A.33 33 WEEKS GESTATION OF PREGNANCY: ICD-10-CM

## 2025-02-03 DIAGNOSIS — O99.810 ABNORMAL GLUCOSE TOLERANCE TEST (GTT) DURING PREGNANCY, ANTEPARTUM: ICD-10-CM

## 2025-02-03 DIAGNOSIS — O09.899 HISTORY OF PRETERM DELIVERY, CURRENTLY PREGNANT: ICD-10-CM

## 2025-02-03 DIAGNOSIS — F41.9 ANXIETY DURING PREGNANCY: Primary | ICD-10-CM

## 2025-02-03 DIAGNOSIS — O99.340 ANXIETY DURING PREGNANCY: Primary | ICD-10-CM

## 2025-02-03 DIAGNOSIS — Z98.891 HISTORY OF CLASSICAL CESAREAN SECTION: ICD-10-CM

## 2025-02-03 PROCEDURE — PNV: Performed by: NURSE PRACTITIONER

## 2025-02-17 PROBLEM — R22.31 MASS OF RIGHT AXILLA: Status: RESOLVED | Noted: 2021-10-04 | Resolved: 2025-02-17

## 2025-02-17 PROBLEM — Z3A.35 35 WEEKS GESTATION OF PREGNANCY: Status: ACTIVE | Noted: 2024-12-27

## 2025-02-17 NOTE — PROGRESS NOTES
Name: Ailyn Muro      : 1995      MRN: [id number]  Encounter Provider: DANIEL Herbert  Encounter Date: 2025   Encounter department: [facility] [place]  :  Assessment & Plan  35 weeks gestation of pregnancy  Continue PNV daily   Continue FKC daily   GBS collected.  +  penicillin allergy - hives/ itchy throat   Common discomforts of pregnancy and precautions including PTL reviewed           History of  delivery, currently pregnant         History of classical  section  Repeat recommended 36-37 gestational weeks   Repeat  scheduled 25  Preoperative instructions provided . Has chlorhexidine wash             Abnormal glucose tolerance test (GTT) during pregnancy, antepartum  3 hour WNL           Anxiety during pregnancy  Continue Hydroxyzine as needed            RTO PP exam    History of Present Illness   HPI  Ailyn Muro is a 29 y.o. female who presents for PN visit       Denies loss of fluid, vaginal bleeding and abdominal pain.  Confirms frequent fetal movement.  Doing fetal kick counts.  Tolerating prenatal vitamin and hydroxyzine well.     History obtained from: patient    Review of Systems   Constitutional:  Negative for chills and fever.   Respiratory: Negative.     Cardiovascular: Negative.      Medical History Reviewed by provider this encounter:  Allergies  Meds     .     Objective   LMP 2024 (Exact Date)      Physical Exam  Constitutional:       Appearance: Normal appearance.   Neurological:      Mental Status: She is alert and oriented to person, place, and time.   Psychiatric:         Mood and Affect: Mood normal.         Behavior: Behavior normal.

## 2025-02-17 NOTE — ASSESSMENT & PLAN NOTE
Continue PNV daily   Continue FKC daily   GBS collected.  +  penicillin allergy - hives/ itchy throat   Common discomforts of pregnancy and precautions including PTL reviewed

## 2025-02-18 ENCOUNTER — ROUTINE PRENATAL (OUTPATIENT)
Dept: OBGYN CLINIC | Facility: CLINIC | Age: 30
End: 2025-02-18

## 2025-02-18 VITALS — DIASTOLIC BLOOD PRESSURE: 64 MMHG | BODY MASS INDEX: 27.18 KG/M2 | WEIGHT: 200.4 LBS | SYSTOLIC BLOOD PRESSURE: 100 MMHG

## 2025-02-18 DIAGNOSIS — O99.810 ABNORMAL GLUCOSE TOLERANCE TEST (GTT) DURING PREGNANCY, ANTEPARTUM: ICD-10-CM

## 2025-02-18 DIAGNOSIS — O99.340 ANXIETY DURING PREGNANCY: ICD-10-CM

## 2025-02-18 DIAGNOSIS — Z3A.35 35 WEEKS GESTATION OF PREGNANCY: ICD-10-CM

## 2025-02-18 DIAGNOSIS — O09.899 HISTORY OF PRETERM DELIVERY, CURRENTLY PREGNANT: Primary | ICD-10-CM

## 2025-02-18 DIAGNOSIS — F41.9 ANXIETY DURING PREGNANCY: ICD-10-CM

## 2025-02-18 DIAGNOSIS — Z98.891 HISTORY OF CLASSICAL CESAREAN SECTION: ICD-10-CM

## 2025-02-18 PROCEDURE — PNV: Performed by: NURSE PRACTITIONER

## 2025-02-18 PROCEDURE — 87150 DNA/RNA AMPLIFIED PROBE: CPT | Performed by: NURSE PRACTITIONER

## 2025-02-20 LAB — GP B STREP DNA SPEC QL NAA+PROBE: NEGATIVE

## 2025-02-21 ENCOUNTER — RESULTS FOLLOW-UP (OUTPATIENT)
Dept: OBGYN CLINIC | Facility: MEDICAL CENTER | Age: 30
End: 2025-02-21

## 2025-02-21 DIAGNOSIS — K21.9 GASTROESOPHAGEAL REFLUX DISEASE, UNSPECIFIED WHETHER ESOPHAGITIS PRESENT: ICD-10-CM

## 2025-02-21 RX ORDER — FAMOTIDINE 20 MG/1
20 TABLET, FILM COATED ORAL 2 TIMES DAILY PRN
Qty: 180 TABLET | Refills: 1 | Status: SHIPPED | OUTPATIENT
Start: 2025-02-21

## 2025-02-24 ENCOUNTER — TELEPHONE (OUTPATIENT)
Dept: OBGYN CLINIC | Facility: CLINIC | Age: 30
End: 2025-02-24

## 2025-02-26 ENCOUNTER — ANESTHESIA EVENT (INPATIENT)
Dept: LABOR AND DELIVERY | Facility: HOSPITAL | Age: 30
End: 2025-02-26
Payer: COMMERCIAL

## 2025-02-26 RX ORDER — ONDANSETRON 2 MG/ML
4 INJECTION INTRAMUSCULAR; INTRAVENOUS ONCE AS NEEDED
Status: DISCONTINUED | OUTPATIENT
Start: 2025-02-26 | End: 2025-02-28

## 2025-02-26 RX ORDER — SODIUM CHLORIDE 9 MG/ML
125 INJECTION, SOLUTION INTRAVENOUS CONTINUOUS
Status: CANCELLED | OUTPATIENT
Start: 2025-02-26

## 2025-02-26 RX ORDER — FENTANYL CITRATE/PF 50 MCG/ML
25 SYRINGE (ML) INJECTION
Status: DISCONTINUED | OUTPATIENT
Start: 2025-02-26 | End: 2025-02-28

## 2025-02-26 RX ORDER — MEPERIDINE HYDROCHLORIDE 25 MG/ML
12.5 INJECTION INTRAMUSCULAR; INTRAVENOUS; SUBCUTANEOUS
Status: DISCONTINUED | OUTPATIENT
Start: 2025-02-26 | End: 2025-02-28

## 2025-02-27 ENCOUNTER — ROUTINE PRENATAL (OUTPATIENT)
Dept: OBGYN CLINIC | Facility: MEDICAL CENTER | Age: 30
End: 2025-02-27

## 2025-02-27 VITALS — BODY MASS INDEX: 27.26 KG/M2 | DIASTOLIC BLOOD PRESSURE: 68 MMHG | SYSTOLIC BLOOD PRESSURE: 118 MMHG | WEIGHT: 201 LBS

## 2025-02-27 DIAGNOSIS — Z98.891 HISTORY OF CLASSICAL CESAREAN SECTION: ICD-10-CM

## 2025-02-27 DIAGNOSIS — Z3A.36 36 WEEKS GESTATION OF PREGNANCY: Primary | ICD-10-CM

## 2025-02-27 DIAGNOSIS — O09.899 HISTORY OF PRETERM DELIVERY, CURRENTLY PREGNANT: ICD-10-CM

## 2025-02-27 PROCEDURE — PNV: Performed by: OBSTETRICS & GYNECOLOGY

## 2025-02-27 NOTE — PROGRESS NOTES
Ailyn is a 29 y.o. year old  at 36w6d for routine prenatal visit.   + FM, no vaginal bleeding, contractions, or LOF  Complaints: No   Has scheduled  repeat C/S tomorrow  given previous classical  C/S    Has Hibiclens at home   FKC and labor precautions reviewed   Most recent ultrasound and labs reviewed.

## 2025-02-28 ENCOUNTER — HOSPITAL ENCOUNTER (INPATIENT)
Facility: HOSPITAL | Age: 30
LOS: 3 days | Discharge: HOME | End: 2025-03-03
Attending: OBSTETRICS & GYNECOLOGY | Admitting: OBSTETRICS & GYNECOLOGY
Payer: COMMERCIAL

## 2025-02-28 ENCOUNTER — ANESTHESIA (INPATIENT)
Dept: LABOR AND DELIVERY | Facility: HOSPITAL | Age: 30
End: 2025-02-28
Payer: COMMERCIAL

## 2025-02-28 DIAGNOSIS — O09.899 HISTORY OF PRETERM DELIVERY, CURRENTLY PREGNANT: Primary | ICD-10-CM

## 2025-02-28 DIAGNOSIS — Z98.891 HISTORY OF CLASSICAL CESAREAN SECTION: ICD-10-CM

## 2025-02-28 DIAGNOSIS — Z98.891 S/P REPEAT LOW TRANSVERSE C-SECTION: ICD-10-CM

## 2025-02-28 PROBLEM — Z3A.37 37 WEEKS GESTATION OF PREGNANCY: Status: ACTIVE | Noted: 2024-12-27

## 2025-02-28 LAB
ABO GROUP BLD: NORMAL
ANION GAP SERPL CALCULATED.3IONS-SCNC: 8 MMOL/L (ref 4–13)
BASE EXCESS BLDCOA CALC-SCNC: -3.8 MMOL/L (ref 3–11)
BASE EXCESS BLDCOV CALC-SCNC: -2.9 MMOL/L (ref 1–9)
BLD GP AB SCN SERPL QL: NEGATIVE
BUN SERPL-MCNC: 8 MG/DL (ref 5–25)
CALCIUM SERPL-MCNC: 8.5 MG/DL (ref 8.4–10.2)
CHLORIDE SERPL-SCNC: 107 MMOL/L (ref 96–108)
CO2 SERPL-SCNC: 21 MMOL/L (ref 21–32)
CREAT SERPL-MCNC: 0.46 MG/DL (ref 0.6–1.3)
ERYTHROCYTE [DISTWIDTH] IN BLOOD BY AUTOMATED COUNT: 13 % (ref 11.6–15.1)
GFR SERPL CREATININE-BSD FRML MDRD: 135 ML/MIN/1.73SQ M
GLUCOSE SERPL-MCNC: 91 MG/DL (ref 65–140)
HCO3 BLDCOA-SCNC: 24 MMOL/L (ref 17.3–27.3)
HCO3 BLDCOV-SCNC: 21.7 MMOL/L (ref 12.2–28.6)
HCT VFR BLD AUTO: 34 % (ref 34.8–46.1)
HGB BLD-MCNC: 11.2 G/DL (ref 11.5–15.4)
MCH RBC QN AUTO: 29.3 PG (ref 26.8–34.3)
MCHC RBC AUTO-ENTMCNC: 32.9 G/DL (ref 31.4–37.4)
MCV RBC AUTO: 89 FL (ref 82–98)
O2 CT VFR BLDCOA CALC: 5 ML/DL
OXYHGB MFR BLDCOA: 25.6 %
OXYHGB MFR BLDCOV: 62.8 %
PCO2 BLDCOA: 54.8 MM[HG] (ref 30–60)
PCO2 BLDCOV: 37.6 MM HG (ref 27–43)
PH BLDCOA: 7.26 [PH] (ref 7.23–7.43)
PH BLDCOV: 7.38 [PH] (ref 7.19–7.49)
PLATELET # BLD AUTO: 242 THOUSANDS/UL (ref 149–390)
PMV BLD AUTO: 10.7 FL (ref 8.9–12.7)
PO2 BLDCOA: 14.7 MM HG (ref 5–25)
PO2 BLDCOV: 25.1 MM HG (ref 15–45)
POTASSIUM SERPL-SCNC: 4 MMOL/L (ref 3.5–5.3)
RBC # BLD AUTO: 3.82 MILLION/UL (ref 3.81–5.12)
RH BLD: POSITIVE
SAO2 % BLDCOV: 11.9 ML/DL
SODIUM SERPL-SCNC: 136 MMOL/L (ref 135–147)
SPECIMEN EXPIRATION DATE: NORMAL
TREPONEMA PALLIDUM IGG+IGM AB [PRESENCE] IN SERUM OR PLASMA BY IMMUNOASSAY: NORMAL
WBC # BLD AUTO: 12.4 THOUSAND/UL (ref 4.31–10.16)

## 2025-02-28 PROCEDURE — NC001 PR NO CHARGE: Performed by: OBSTETRICS & GYNECOLOGY

## 2025-02-28 PROCEDURE — 59510 CESAREAN DELIVERY: CPT | Performed by: OBSTETRICS & GYNECOLOGY

## 2025-02-28 PROCEDURE — 86901 BLOOD TYPING SEROLOGIC RH(D): CPT

## 2025-02-28 PROCEDURE — 86850 RBC ANTIBODY SCREEN: CPT

## 2025-02-28 PROCEDURE — 80048 BASIC METABOLIC PNL TOTAL CA: CPT

## 2025-02-28 PROCEDURE — 82805 BLOOD GASES W/O2 SATURATION: CPT | Performed by: OBSTETRICS & GYNECOLOGY

## 2025-02-28 PROCEDURE — 86900 BLOOD TYPING SEROLOGIC ABO: CPT

## 2025-02-28 PROCEDURE — 4A1HXCZ MONITORING OF PRODUCTS OF CONCEPTION, CARDIAC RATE, EXTERNAL APPROACH: ICD-10-PCS | Performed by: OBSTETRICS & GYNECOLOGY

## 2025-02-28 PROCEDURE — 4A0HXCZ MEASUREMENT OF PRODUCTS OF CONCEPTION, CARDIAC RATE, EXTERNAL APPROACH: ICD-10-PCS | Performed by: OBSTETRICS & GYNECOLOGY

## 2025-02-28 PROCEDURE — 86780 TREPONEMA PALLIDUM: CPT

## 2025-02-28 PROCEDURE — 85027 COMPLETE CBC AUTOMATED: CPT

## 2025-02-28 RX ORDER — OXYTOCIN/RINGER'S LACTATE 30/500 ML
PLASTIC BAG, INJECTION (ML) INTRAVENOUS CONTINUOUS PRN
Status: DISCONTINUED | OUTPATIENT
Start: 2025-02-28 | End: 2025-02-28

## 2025-02-28 RX ORDER — FENTANYL CITRATE 50 UG/ML
INJECTION, SOLUTION INTRAMUSCULAR; INTRAVENOUS AS NEEDED
Status: DISCONTINUED | OUTPATIENT
Start: 2025-02-28 | End: 2025-02-28

## 2025-02-28 RX ORDER — FAMOTIDINE 20 MG/1
20 TABLET, FILM COATED ORAL 2 TIMES DAILY PRN
Status: DISCONTINUED | OUTPATIENT
Start: 2025-02-28 | End: 2025-03-03 | Stop reason: HOSPADM

## 2025-02-28 RX ORDER — MORPHINE SULFATE 0.5 MG/ML
INJECTION, SOLUTION EPIDURAL; INTRATHECAL; INTRAVENOUS
Status: COMPLETED
Start: 2025-02-28 | End: 2025-02-28

## 2025-02-28 RX ORDER — KETOROLAC TROMETHAMINE 30 MG/ML
30 INJECTION, SOLUTION INTRAMUSCULAR; INTRAVENOUS EVERY 6 HOURS SCHEDULED
Status: DISCONTINUED | OUTPATIENT
Start: 2025-02-28 | End: 2025-03-01

## 2025-02-28 RX ORDER — ENOXAPARIN SODIUM 100 MG/ML
40 INJECTION SUBCUTANEOUS DAILY
Status: DISCONTINUED | OUTPATIENT
Start: 2025-03-01 | End: 2025-02-28

## 2025-02-28 RX ORDER — ONDANSETRON 2 MG/ML
4 INJECTION INTRAMUSCULAR; INTRAVENOUS EVERY 8 HOURS PRN
Status: DISCONTINUED | OUTPATIENT
Start: 2025-02-28 | End: 2025-02-28

## 2025-02-28 RX ORDER — DIPHENHYDRAMINE HCL 25 MG
25 TABLET ORAL EVERY 6 HOURS PRN
Status: DISCONTINUED | OUTPATIENT
Start: 2025-02-28 | End: 2025-03-03 | Stop reason: HOSPADM

## 2025-02-28 RX ORDER — SENNOSIDES 8.6 MG
1 TABLET ORAL DAILY
Status: DISCONTINUED | OUTPATIENT
Start: 2025-02-28 | End: 2025-03-03 | Stop reason: HOSPADM

## 2025-02-28 RX ORDER — HYDROMORPHONE HCL/PF 1 MG/ML
0.5 SYRINGE (ML) INJECTION EVERY 2 HOUR PRN
Status: ACTIVE | OUTPATIENT
Start: 2025-02-28 | End: 2025-03-01

## 2025-02-28 RX ORDER — PHENYLEPHRINE HYDROCHLORIDE 10 MG/ML
INJECTION INTRAVENOUS
Status: DISPENSED
Start: 2025-02-28 | End: 2025-02-28

## 2025-02-28 RX ORDER — OXYCODONE HYDROCHLORIDE 10 MG/1
10 TABLET ORAL EVERY 4 HOURS PRN
Refills: 0 | Status: DISCONTINUED | OUTPATIENT
Start: 2025-03-01 | End: 2025-03-03 | Stop reason: HOSPADM

## 2025-02-28 RX ORDER — ONDANSETRON 2 MG/ML
4 INJECTION INTRAMUSCULAR; INTRAVENOUS EVERY 6 HOURS PRN
Status: ACTIVE | OUTPATIENT
Start: 2025-02-28 | End: 2025-03-01

## 2025-02-28 RX ORDER — METOCLOPRAMIDE HYDROCHLORIDE 5 MG/ML
5 INJECTION INTRAMUSCULAR; INTRAVENOUS EVERY 6 HOURS PRN
Status: ACTIVE | OUTPATIENT
Start: 2025-02-28 | End: 2025-03-01

## 2025-02-28 RX ORDER — OXYCODONE HYDROCHLORIDE 5 MG/1
5 TABLET ORAL EVERY 4 HOURS PRN
Refills: 0 | Status: DISCONTINUED | OUTPATIENT
Start: 2025-03-01 | End: 2025-03-03 | Stop reason: HOSPADM

## 2025-02-28 RX ORDER — ONDANSETRON 2 MG/ML
4 INJECTION INTRAMUSCULAR; INTRAVENOUS ONCE AS NEEDED
Status: DISCONTINUED | OUTPATIENT
Start: 2025-02-28 | End: 2025-03-02 | Stop reason: SDUPTHER

## 2025-02-28 RX ORDER — NALBUPHINE HYDROCHLORIDE 10 MG/ML
3 INJECTION INTRAMUSCULAR; INTRAVENOUS; SUBCUTANEOUS
Status: ACTIVE | OUTPATIENT
Start: 2025-02-28 | End: 2025-03-01

## 2025-02-28 RX ORDER — HYDROXYZINE HYDROCHLORIDE 25 MG/1
25 TABLET, FILM COATED ORAL EVERY 6 HOURS PRN
Status: DISCONTINUED | OUTPATIENT
Start: 2025-02-28 | End: 2025-03-03 | Stop reason: HOSPADM

## 2025-02-28 RX ORDER — IBUPROFEN 600 MG/1
600 TABLET, FILM COATED ORAL EVERY 6 HOURS
Status: DISCONTINUED | OUTPATIENT
Start: 2025-03-02 | End: 2025-03-01

## 2025-02-28 RX ORDER — CALCIUM CARBONATE 500 MG/1
1000 TABLET, CHEWABLE ORAL DAILY PRN
Status: DISCONTINUED | OUTPATIENT
Start: 2025-02-28 | End: 2025-03-03 | Stop reason: HOSPADM

## 2025-02-28 RX ORDER — BENZOCAINE/MENTHOL 6 MG-10 MG
1 LOZENGE MUCOUS MEMBRANE DAILY PRN
Status: DISCONTINUED | OUTPATIENT
Start: 2025-02-28 | End: 2025-03-03 | Stop reason: HOSPADM

## 2025-02-28 RX ORDER — OXYCODONE HYDROCHLORIDE 5 MG/1
5 TABLET ORAL EVERY 4 HOURS PRN
Status: DISPENSED | OUTPATIENT
Start: 2025-02-28 | End: 2025-03-01

## 2025-02-28 RX ORDER — ONDANSETRON 2 MG/ML
4 INJECTION INTRAMUSCULAR; INTRAVENOUS EVERY 8 HOURS PRN
Status: DISCONTINUED | OUTPATIENT
Start: 2025-03-01 | End: 2025-03-03 | Stop reason: HOSPADM

## 2025-02-28 RX ORDER — CEFAZOLIN SODIUM 2 G/50ML
2000 SOLUTION INTRAVENOUS ONCE
Status: COMPLETED | OUTPATIENT
Start: 2025-02-28 | End: 2025-02-28

## 2025-02-28 RX ORDER — ONDANSETRON 2 MG/ML
INJECTION INTRAMUSCULAR; INTRAVENOUS AS NEEDED
Status: DISCONTINUED | OUTPATIENT
Start: 2025-02-28 | End: 2025-02-28

## 2025-02-28 RX ORDER — ACETAMINOPHEN 325 MG/1
650 TABLET ORAL EVERY 6 HOURS SCHEDULED
Status: COMPLETED | OUTPATIENT
Start: 2025-02-28 | End: 2025-03-03

## 2025-02-28 RX ORDER — PHENYLEPHRINE HCL IN 0.9% NACL 1 MG/10 ML
SYRINGE (ML) INTRAVENOUS
Status: COMPLETED
Start: 2025-02-28 | End: 2025-02-28

## 2025-02-28 RX ORDER — PROMETHAZINE HYDROCHLORIDE 25 MG/ML
6.25 INJECTION, SOLUTION INTRAMUSCULAR; INTRAVENOUS ONCE AS NEEDED
Status: DISCONTINUED | OUTPATIENT
Start: 2025-02-28 | End: 2025-03-02 | Stop reason: SDUPTHER

## 2025-02-28 RX ORDER — KETOROLAC TROMETHAMINE 30 MG/ML
INJECTION, SOLUTION INTRAMUSCULAR; INTRAVENOUS AS NEEDED
Status: DISCONTINUED | OUTPATIENT
Start: 2025-02-28 | End: 2025-02-28

## 2025-02-28 RX ORDER — SIMETHICONE 80 MG
80 TABLET,CHEWABLE ORAL 4 TIMES DAILY PRN
Status: DISCONTINUED | OUTPATIENT
Start: 2025-02-28 | End: 2025-03-03 | Stop reason: HOSPADM

## 2025-02-28 RX ORDER — FENTANYL CITRATE/PF 50 MCG/ML
50 SYRINGE (ML) INJECTION
Status: DISCONTINUED | OUTPATIENT
Start: 2025-02-28 | End: 2025-03-02 | Stop reason: SDUPTHER

## 2025-02-28 RX ORDER — OXYTOCIN/RINGER'S LACTATE 30/500 ML
PLASTIC BAG, INJECTION (ML) INTRAVENOUS
Status: COMPLETED
Start: 2025-02-28 | End: 2025-02-28

## 2025-02-28 RX ORDER — ONDANSETRON 2 MG/ML
INJECTION INTRAMUSCULAR; INTRAVENOUS
Status: COMPLETED
Start: 2025-02-28 | End: 2025-02-28

## 2025-02-28 RX ORDER — KETOROLAC TROMETHAMINE 30 MG/ML
INJECTION, SOLUTION INTRAMUSCULAR; INTRAVENOUS
Status: COMPLETED
Start: 2025-02-28 | End: 2025-02-28

## 2025-02-28 RX ORDER — DIPHENHYDRAMINE HYDROCHLORIDE 50 MG/ML
25 INJECTION INTRAMUSCULAR; INTRAVENOUS EVERY 6 HOURS PRN
Status: DISPENSED | OUTPATIENT
Start: 2025-02-28 | End: 2025-03-01

## 2025-02-28 RX ORDER — MORPHINE SULFATE 0.5 MG/ML
INJECTION, SOLUTION EPIDURAL; INTRATHECAL; INTRAVENOUS AS NEEDED
Status: DISCONTINUED | OUTPATIENT
Start: 2025-02-28 | End: 2025-02-28

## 2025-02-28 RX ORDER — PHENYLEPHRINE HCL IN 0.9% NACL 1 MG/10 ML
SYRINGE (ML) INTRAVENOUS AS NEEDED
Status: DISCONTINUED | OUTPATIENT
Start: 2025-02-28 | End: 2025-02-28

## 2025-02-28 RX ORDER — SODIUM CHLORIDE, SODIUM LACTATE, POTASSIUM CHLORIDE, CALCIUM CHLORIDE 600; 310; 30; 20 MG/100ML; MG/100ML; MG/100ML; MG/100ML
125 INJECTION, SOLUTION INTRAVENOUS CONTINUOUS
Status: DISCONTINUED | OUTPATIENT
Start: 2025-02-28 | End: 2025-03-03 | Stop reason: HOSPADM

## 2025-02-28 RX ORDER — FENTANYL CITRATE 50 UG/ML
INJECTION, SOLUTION INTRAMUSCULAR; INTRAVENOUS
Status: COMPLETED
Start: 2025-02-28 | End: 2025-02-28

## 2025-02-28 RX ORDER — NALOXONE HYDROCHLORIDE 0.4 MG/ML
0.1 INJECTION, SOLUTION INTRAMUSCULAR; INTRAVENOUS; SUBCUTANEOUS
Status: ACTIVE | OUTPATIENT
Start: 2025-02-28 | End: 2025-03-01

## 2025-02-28 RX ORDER — HYDROMORPHONE HCL/PF 1 MG/ML
0.5 SYRINGE (ML) INJECTION
Status: DISCONTINUED | OUTPATIENT
Start: 2025-02-28 | End: 2025-03-02 | Stop reason: SDUPTHER

## 2025-02-28 RX ORDER — OXYTOCIN/RINGER'S LACTATE 30/500 ML
62.5 PLASTIC BAG, INJECTION (ML) INTRAVENOUS ONCE
Status: DISCONTINUED | OUTPATIENT
Start: 2025-02-28 | End: 2025-03-03 | Stop reason: HOSPADM

## 2025-02-28 RX ORDER — BUPIVACAINE HYDROCHLORIDE 7.5 MG/ML
INJECTION, SOLUTION INTRASPINAL AS NEEDED
Status: DISCONTINUED | OUTPATIENT
Start: 2025-02-28 | End: 2025-02-28

## 2025-02-28 RX ADMIN — OXYCODONE 5 MG: 5 TABLET ORAL at 16:36

## 2025-02-28 RX ADMIN — OXYTOCIN 250 MILLI-UNITS/MIN: 10 INJECTION INTRAVENOUS at 08:38

## 2025-02-28 RX ADMIN — Medication 100 MCG: at 07:49

## 2025-02-28 RX ADMIN — KETOROLAC TROMETHAMINE 30 MG: 30 INJECTION, SOLUTION INTRAMUSCULAR; INTRAVENOUS at 08:36

## 2025-02-28 RX ADMIN — FENTANYL CITRATE 15 MCG: 50 INJECTION INTRAMUSCULAR; INTRAVENOUS at 07:41

## 2025-02-28 RX ADMIN — Medication 200 MCG: at 07:47

## 2025-02-28 RX ADMIN — SODIUM CHLORIDE, SODIUM LACTATE, POTASSIUM CHLORIDE, AND CALCIUM CHLORIDE 1000 ML: .6; .31; .03; .02 INJECTION, SOLUTION INTRAVENOUS at 06:22

## 2025-02-28 RX ADMIN — DIPHENHYDRAMINE HYDROCHLORIDE 25 MG: 50 INJECTION, SOLUTION INTRAMUSCULAR; INTRAVENOUS at 10:05

## 2025-02-28 RX ADMIN — OXYTOCIN 250 MILLI-UNITS/MIN: 10 INJECTION INTRAVENOUS at 08:06

## 2025-02-28 RX ADMIN — ACETAMINOPHEN 650 MG: 325 TABLET, FILM COATED ORAL at 12:38

## 2025-02-28 RX ADMIN — Medication 100 MCG: at 07:48

## 2025-02-28 RX ADMIN — SODIUM CHLORIDE, SODIUM LACTATE, POTASSIUM CHLORIDE, AND CALCIUM CHLORIDE 125 ML/HR: .6; .31; .03; .02 INJECTION, SOLUTION INTRAVENOUS at 12:38

## 2025-02-28 RX ADMIN — OXYCODONE 5 MG: 5 TABLET ORAL at 11:48

## 2025-02-28 RX ADMIN — SODIUM CHLORIDE, SODIUM LACTATE, POTASSIUM CHLORIDE, AND CALCIUM CHLORIDE: .6; .31; .03; .02 INJECTION, SOLUTION INTRAVENOUS at 07:35

## 2025-02-28 RX ADMIN — MORPHINE SULFATE 0.15 MG: 0.5 INJECTION, SOLUTION EPIDURAL; INTRATHECAL; INTRAVENOUS at 07:41

## 2025-02-28 RX ADMIN — KETOROLAC TROMETHAMINE 30 MG: 30 INJECTION, SOLUTION INTRAMUSCULAR; INTRAVENOUS at 14:20

## 2025-02-28 RX ADMIN — KETOROLAC TROMETHAMINE 30 MG: 30 INJECTION, SOLUTION INTRAMUSCULAR; INTRAVENOUS at 20:17

## 2025-02-28 RX ADMIN — BUPIVACAINE HYDROCHLORIDE IN DEXTROSE 1.6 ML: 7.5 INJECTION, SOLUTION SUBARACHNOID at 07:41

## 2025-02-28 RX ADMIN — CEFAZOLIN SODIUM 2000 MG: 2 SOLUTION INTRAVENOUS at 07:36

## 2025-02-28 RX ADMIN — PHENYLEPHRINE HYDROCHLORIDE 30 MCG/MIN: 50 INJECTION INTRAVENOUS at 07:43

## 2025-02-28 RX ADMIN — ACETAMINOPHEN 650 MG: 325 TABLET, FILM COATED ORAL at 18:34

## 2025-02-28 RX ADMIN — OXYCODONE 5 MG: 5 TABLET ORAL at 21:13

## 2025-02-28 RX ADMIN — ONDANSETRON 4 MG: 2 INJECTION INTRAMUSCULAR; INTRAVENOUS at 07:44

## 2025-02-28 RX ADMIN — SODIUM CHLORIDE, SODIUM LACTATE, POTASSIUM CHLORIDE, AND CALCIUM CHLORIDE: .6; .31; .03; .02 INJECTION, SOLUTION INTRAVENOUS at 08:22

## 2025-02-28 NOTE — ASSESSMENT & PLAN NOTE
In setting of PROM and then labor with fetus transverse  Plan for low transverse  section at 37 weeks

## 2025-02-28 NOTE — LACTATION NOTE
This note was copied from a baby's chart.  CONSULT - LACTATION  Baby Girl Foster 0 days female MRN: [id number]    [facility] NURSERY Room / Bed: (N)/(N) Encounter: [id number]    Maternal Information     MOTHER:  Ailyn Muro  Maternal Age: 29 y.o.  OB History: # 1 - Date: 22, Sex: Male, Weight: 1814 g (4 lb), GA: 29w5d, Type: , Classical, Apgar1: 7, Apgar5: 9, Living: Living, Birth Comments: PPROM at 28w, s/p latency antibiotics, steroid, mag. Started laboring when baby was transverse. Delivered via classical  at 29w    # 2 - Date: 25, Sex: Female, Weight: 3670 g (8 lb 1.5 oz), GA: 37w0d, Type: , Low Transverse, Apgar1: 8, Apgar5: 9, Living: Living, Birth Comments: None   Previous breast reduction surgery? No    Lactation history:   Has patient previously breast fed: Yes   How long had patient previously breast fed: Attempted, pumped for baby   Previous breast feeding complications: Infant separation (Baby was born , in NICU for 7 weeks.)     Past Surgical History:   Procedure Laterality Date    DENTAL SURGERY      [place]  DELIVERY ONLY N/A 2022    Procedure:  SECTION ();  Surgeon: Yandy Velasquez MD;  Location: [facility] ;  Service: Obstetrics       Birth information:  YOB: 2024   Time of birth: 8:05 AM   Sex: female   Delivery type: , Low Transverse   Birth Weight: 3670 g (8 lb 1.5 oz)   Percent of Weight Change: 0%     Gestational Age: 37w0d   [unfilled]    Reason for Consult:    Reason for Consult: Initial assessment (routine) - 10 min, Latch Assess (ext) - 20 min    Risk Factors:    Risk Factors: LPI (LGA, glucose protocol.)    Breast and nipple assessment:   Breasts/Nipples  Left Breast: Soft (Large)  Right Breast: Soft (Large)  Left Nipple: Flat  Right Nipple: Everted, Flat  Intervention: Hand expression  Breastfeeding Status: Yes  Breastfeeding Progress: Not yet  established    OB Lactation Tools:         Breast Pump:    Breast Pump  Pump: Personal (Has a Spectra S1 breast pump through insurance.)       Assessment:  Not assessed at this time , sleepy    Feeding assessment:  Progressing- baby 4 hours old. Sleepy at the breast  requiring stimulation and hand expression.    LATCH:  Latch: Repeated attempts, hold nipple in mouth, stimulate to suck   Audible Swallowing: A few with stimulation   Type of Nipple: Everted (After stimulation)   Comfort (Breast/Nipple): Soft/non-tender   Hold (Positioning): Partial assist, teach one side, mother does other, staff holds   LATCH Score: 7       Feeding recommendations:  breast feed on demand every 2-3 hours, watching for early feeding cues. At least 8-12 feedings in 24 hours.    Baby born at 37 weeks, on glucose protocol for LGA.    Assisted in latching baby at 4 hours post c/s. Patient states that baby has fed twice previously since being born.  Patient has large breasts with shorter flat nipples. Educated patient on the teacup hold to assist in latching baby at the breast. Baby was on and off the breast for 10 minutes. Active sucking noted with stimulation and hand expression. Baby was showing fullness cues post feed.    Discussed different waking techniques to help a sleepy baby - checking baby's diaper, sitting baby upright and burping them. Placing baby skin to skin prior to feedings. Stimulating baby's fingers, toes, ears, and chin while they are feeding on the breast to keep them actively sucking. Switching breasts and positions to extend baby's feed and keep baby stimulated. Review hand expression with patient, with permission.    Discussed that colostrum is thick and sticky and comes in small amounts in the first few days. Reviewed that breast milk will start to transition day 3-5. Discussed benefits of colostrum -  natural laxative and easy to digest for the baby.     Feeding plan:  Patient is encouraged to breastfeed on  demand, every 2-3 hours or when baby showing early feeding cues.  Discussed the importance of ensuring that baby feeds 8-12x in 24 hours and not waiting over 3 hours to feed.   Offer both breasts during a feed.  Notify nursing staff before bringing baby to breast to check baby's glucose.    Discussed how to know the baby is feeding adequately at the breast:  At least 8-12 times in 24 hours.  -Baby is deeply latched onto the breast, with lips flanged out. Actively sucking, swallows may not be audible.  -Mother feels a comfortable tugging sensation during a feeding.  -Baby is showing fullness cues, post feed- content, arms relaxed and/or sleeping.  -Baby is having adequate amounts of diapers and meeting goal diapers.  -Baby's stool is changing from black meconium, to greenish brown to yellow and seedy looking over the first week when exclusively providing breast milk.   -Baby's weight loss is within normal ranges.     Family was encouraged to continue to document baby's feedings and outputs to ensure baby is adequately feeding at the breast.     Patient was encouraged to contact lactation for a latch assessment, continued support and/or questions that arise.     Education provided for family was limited d/t maternal status. Lactation to follow up when patient is feeling better.      TRAVIS Davis 2/28/2025 1:20 PM

## 2025-02-28 NOTE — OP NOTE
OPERATIVE REPORT  PATIENT NAME: Ailyn Muro    :  1995  MRN: [id number]  Pt Location: [facility] OR ROOM 01    SURGERY DATE: 2025    Surgeons and Role:     * Lamonte Oglesby MD - Primary     * Lizabeth Wade MD    Preop Diagnosis:  Pregnancy at 37 weeks gestation  Hx of classical  section  Hx of  delivery     Procedure(s) (LRB):   SECTION () REPEAT (N/A)    Specimen(s):  ID Type Source Tests Collected by Time Destination   A :  Cord Blood Cord BLOOD GAS, VENOUS, CORD Lamonte Oglesby MD 2025 0748    B :  Cord Blood Cord BLOOD GAS, ARTERIAL, CORD Lamonte Oglesby MD 2025 0748    C :  Tissue (Placenta on Hold) OB Only Placenta PLACENTA IN STORAGE Lamonte Oglesby MD 2025 0755        Surgical QBL:  Surgical QBL (mL): 605 mL      Drains:  Urethral Catheter Double-lumen 16 Fr. (Active)   Number of days: 0       Anesthesia Type:   Spinal    Operative Indications:  Pregnancy at 37 weeks gestation  Hx of classical  section  Hx of  delivery       Dickson Group Classification System:  No Multiple pregnancy, No Transverse or oblique lie, No Breech lie, Gestational age is > or =37 weeks, Multiparous, Previous uterine scar +  is DICKSON GROUP 5    Operative Findings:  Peritoneal adhesions to the anterior abdominal wall  Uterus with midline muscle thinning at the location of the prior classical incision  Viable female  delivered at 0805 with APGARS of 8 and 9 at 1 and 5 minutes respectively. Birth weight 8lbs 1.5oz  Intact placenta with normal insertion of 3 vessel umbilical cord  Normal appearing fallopian tubes and ovaries bilaterally  Umbilical Cord Arterial & Venous Blood Gases:  Umbilical Cord Venous Blood Gas:  Results from last 7 days   Lab Units 25  0748   PH COV  7.380   PCO2 COV mm HG 37.6   HCO3 COV mmol/L 21.7   BASE EXC COV mmol/L -2.9*   O2 CT CD VB mL/dL 11.9   O2 HGB, VENOUS CORD % 62.8     Umbilical Cord Arterial Blood  Gas:  Results from last 7 days   Lab Units 02/28/25  0748   PH COA  7.259   PCO2 COA  54.8   PO2 COA mm HG 14.7   HCO3 COA mmol/L 24.0   BASE EXC COA mmol/L -3.8*   O2 CONTENT CORD ART ml/dl 5.0   O2 HGB, ARTERIAL CORD % 25.6         Complications:   None apparent    Procedure and Technique:  In the operating room the correct patient and procedures were identified. Spinal anesthesia was adequately established. Ancef IV was given for preoperative surgical prophylaxis. Patient was placed in the dorsal supine position with a left tilt of the hips. SCDs were applied to the lower extremities. Fetal heart tones were confirmed to be 150s and reassuring. Chlorhexidine was used to prep the vagina and perineum. A jade catheter was placed without difficulty. Chloraprep was used to prep the abdomen. She was draped in the usual sterile fashion. Time out was performed with all in agreement.     Pfannenstiel incision was made in the skin with Bovie electrocautery dissection was carried out over subsequent layers of tissue down to the level of the fascia. The fascia was incised at the midline and the incision was extended bilaterally using Bovie electrocautery. The rectus muscles were dissected off the fascial edges and then divided at midline. The peritoneum was identified, entered bluntly and extended superiorly and inferiorly. The bladder blade was inserted and the vesicouterine peritoneum was identified. A transverse incision was made in the lower uterine segment using a new surgical blade. Clear amniotic fluid was noted. The uterine incision was extended cephalad and caudal using blunt dissection. The surgeon's hand was placed into the uterine cavity. The fetal head was identified and elevated through the uterine incision with the assistance of fundal pressure. There was a nuchal cord x2 noted and reduced. With gentle traction, the shoulders were delivered followed by the rest of the fetal body. Following delayed cord  clamping, the umbilical cord was doubly clamped and cut. The infant was then passed off the table to the awaiting  staff. The  was noted to cry spontaneously and moved all extremities. Blood gas, cord blood, and portion of cord were obtained for analysis and routine blood testing.  The placenta delivered with uterine massage and was noted to be intact with and had normal insertion of a three-vessel cord. The placenta was sent to storage. Oxytocin was administered by IV infusion to enhance uterine contraction. The uterus was exteriorized and cleared of all clots and remaining products of conception.      The hysterotomy was reapproximated using 0-Vicryl in a running nonlocked fashion.  A second imbricating stitch with 0-Vicryl was applied. Bovie electrocautery was used to obtain hemostasis for serosal oozing. Hemostasis was achieved. The posterior cul-de-sac was cleared of all clots and products of conception. The uterus was replaced into the abdomen and the pericolic gutters were cleared of all clots. Hemostasis was once again confirmed at the hysterotomy. Surgicel was placed on the anterior portion of the uterus. The fascia was reapproximated using 0-Vicryl in a running nonlocked fashion. The skin incision was closed in a running subcuticular fashion with 4-0 Monocryl.  Good hemostasis was noted. Exofin was applied. The uterus was expressed of clots. Patient tolerated the procedure well.  All needle, sponge, and instrument counts were noted to be correct x 2 at the end of the procedure.  Patient was transferred to the recovery room in stable condition.      Dr. Oglesby was present for the procedure.      Patient Disposition:  PACU         SIGNATURE: Lizabeth Wade MD  DATE: 2025  TIME: 9:00 AM

## 2025-02-28 NOTE — ASSESSMENT & PLAN NOTE
Anesthesia consult for regional anesthesia  Ancef 2g IV for surgical prophylaxis  FU CBC, Syphilis screening, Blood Type & Screen

## 2025-02-28 NOTE — ANESTHESIA PREPROCEDURE EVALUATION
Procedure:   SECTION () REPEAT (Uterus)    Relevant Problems   CARDIO   (+) Migraine      GYN   (+) 37 weeks gestation of pregnancy      MUSCULOSKELETAL   (+) Acute bilateral low back pain without sciatica   (+) Chronic bilateral low back pain without sciatica      NEURO/PSYCH   (+) Anxiety   (+) Chronic bilateral low back pain without sciatica   (+) Migraine        Physical Exam    Airway    Mallampati score: II  TM Distance: >3 FB  Neck ROM: full     Dental   No notable dental hx     Cardiovascular  Rhythm: regular, Rate: normal, Cardiovascular exam normal    Pulmonary  Pulmonary exam normal Breath sounds clear to auscultation    Other Findings  post-pubertal.      Anesthesia Plan  ASA Score- 2     Anesthesia Type- spinal with ASA Monitors.         Additional Monitors:     Airway Plan:            Plan Factors-    Chart reviewed.   Existing labs reviewed. Patient summary reviewed.                  Induction-     Postoperative Plan- Plan for postoperative opioid use.     Perioperative Resuscitation Plan - Level 1 - Full Code.       Informed Consent- Anesthetic plan and risks discussed with patient.        NPO Status:  Vitals Value Taken Time   Date of last liquid 25 0610   Time of last liquid 2230 25 0610   Date of last solid 25 0610   Time of last solid 1830 25 0610

## 2025-02-28 NOTE — H&P
"H & P- Obstetrics   Ailyn Muro 29 y.o. female MRN: [id number]  Unit/Bed#: -01 Encounter: [id number]      Assessment/Plan:    Ailyn is a 29 y.o.  at 37w0d admitted for scheduled repeat  section in setting of previous classical  section.     Assessment & Plan  37 weeks gestation of pregnancy  Anesthesia consult for regional anesthesia  Ancef 2g IV for surgical prophylaxis  FU CBC, Syphilis screening, Blood Type & Screen  Abnormal glucose tolerance test (GTT) during pregnancy, antepartum  Elevated 1hr GTT of 137  3hr wnl  History of classical  section  In setting of PROM and then labor with fetus transverse  Plan for low transverse  section at 37 weeks  History of  delivery, currently pregnant  See plan under \"History of classical  section\"        Patient of: [facility]  This patient will be an INPATIENT  and I certify the anticipated length of stay is >2 Midnights  Discussed with Dr. Oglesby       SUBJECTIVE:    Chief Complaint: I am here for my     HPI: Ailyn Muro is a 29 y.o.  with an GUIDO of 3/21/2025, by Last Menstrual Period at 37w0d who is being admitted for scheduled repeat  section. She denies having uterine contractions, has no LOF, and reports no VB. She states she has felt good FM. This pregnancy is complicated by a history of classical cearean section. She reports she feels very anxious this morning because her last  was so stressful. She denies any other complaints. All other review of systems is negative.       Pregnancy Plan:  Pregnancy: An  Fetal sex: Female  Support person:  - Awais     Delivery Plans  Planned delivery method:   Planned delivery location: [facility]  Planned anesthesia: Spinal  Acceptable blood products: All     Post-Delivery Plans  Feeding intentions: Breast Milk      Patient Active Problem List   Diagnosis    Atypical nevi    Family history of deep vein " thrombosis    Anxiety    Migraine    Accessory breast tissue of axilla    Chronic bilateral low back pain without sciatica    Acute bilateral low back pain without sciatica    History of  delivery, currently pregnant    History of classical  section    Anxiety during pregnancy    Abnormal glucose tolerance test (GTT) during pregnancy, antepartum    37 weeks gestation of pregnancy       OB History    Para Term  AB Living   2 1 0 1 0 1   SAB IAB Ectopic Multiple Live Births   0 0 0 0 1      # Outcome Date GA Lbr Clinton/2nd Weight Sex Type Anes PTL Lv   2 Current            1  22 29w5d  1814 g (4 lb) M CS-Classical Spinal Y AFSHIN      Birth Comments: PPROM at 28w, s/p latency antibiotics, steroid, mag. Started laboring when baby was transverse. Delivered via classical  at 29w       Past Medical History:   Diagnosis Date    Common wart     Resolved 2016     Ganglion cyst     Resolved 2016     Migraine with aura     Resolved 2016     Small intestinal bacterial overgrowth (SIBO)     Varicella     vaccine       Past Surgical History:   Procedure Laterality Date    DENTAL SURGERY      [place]  DELIVERY ONLY N/A 2022    Procedure:  SECTION ();  Surgeon: Yandy Velasquez MD;  Location: [facility] ;  Service: Obstetrics       Social History     Tobacco Use    Smoking status: Never    Smokeless tobacco: Never   Substance Use Topics    Alcohol use: Not Currently     Comment: social       Allergies   Allergen Reactions    Clindamycin Throat Swelling    Sumatriptan Nausea Only and Throat Swelling    Azithromycin GI Intolerance and Vomiting    Penicillins Rash         Medications Prior to Admission:     famotidine (PEPCID) 20 mg tablet    hydrOXYzine HCL (ATARAX) 25 mg tablet    Prenatal Vit-Fe Fumarate-FA (PRENATAL VITAMIN PO)        OBJECTIVE:  Vitals:  Temp:  [98.2 °F (36.8 °C)] 98.2 °F (36.8 °C)  HR:  [108] 108  BP: (102-118)/(68-69)  102/69  Resp:  [18] 18  There is no height or weight on file to calculate BMI.     Physical Exam:  General: Well appearing, no distress  Respiratory: Unlabored breathing, clear to auscultation bilaterally  Cardiovascular: Regular rate and rhythm  Abdomen: Soft, gravid, nontender  Fundal Height: Appropriate for gestational age.  Extremities: Warm and well perfused.  Non tender.  Psychiatric: Behavioral normal        FHT:  Baseline: 140bpm  Moderate variability  15x15 accelerations  No decelerations  NST reactive    TOCO:   No contractions    Prenatal Labs: I have personally reviewed pertinent reports.  , Blood Type:   Lab Results   Component Value Date/Time    ABO Grouping B 08/31/2024 08:16 AM     , D (Rh type):   Lab Results   Component Value Date/Time    Rh Type RH(D) POSITIVE 08/31/2024 08:16 AM     , Antibody Screen: Negative  , HCT/HGB:   Lab Results   Component Value Date/Time    Hematocrit 34.0 (L) 02/28/2025 05:59 AM    Hemoglobin 11.2 (L) 02/28/2025 05:59 AM      , MCV:   Lab Results   Component Value Date/Time    MCV 89 02/28/2025 05:59 AM      , Platelets:   Lab Results   Component Value Date/Time    Platelets 242 02/28/2025 05:59 AM      , 1 hour Glucola:   Lab Results   Component Value Date/Time    Glucose 137 (H) 12/20/2024 11:31 AM   , 3 hour GTT: 97   , Rubella: Immune     , VDRL/RPR:   Lab Results   Component Value Date/Time    RPR NON-REACTIVE 08/31/2024 08:16 AM      , Urine Culture/Screen:   Lab Results   Component Value Date/Time    Urine Culture 70,000-79,000 cfu/ml Lactobacillus species (A) 12/13/2024 03:26 PM    Urine Culture <10,000 cfu/ml 12/13/2024 03:26 PM       , Hep B: Non-reactive   , Hep C:   Lab Results   Component Value Date/Time    HEP C AB NON-REACTIVE 08/31/2024 08:16 AM      , HIV:   Lab Results   Component Value Date/Time    HIV AG/AB, 4th Gen NON-REACTIVE 08/31/2024 08:16 AM     , Chlamydia: Negative   , Gonorrhea:   Lab Results   Component Value Date/Time    N gonorrhoeae,  "DNA Probe Negative 09/17/2024 01:23 PM     , Group B Strep:    Lab Results   Component Value Date/Time    Strep Grp B PCR Negative 02/18/2025 03:22 PM            Lizabeth Wade MD  2/28/2025  7:01 AM        Portions of the record may have been created with voice recognition software.  Occasional wrong word or \"sound a like\" substitutions may have occurred due to the inherent limitations of voice recognition software.  Read the chart carefully and recognize, using context, where substitutions have occurred    "

## 2025-02-28 NOTE — PROGRESS NOTES
Post Op Check - OB/GYN  Ailyn Muro 29 y.o. female MRN: [id number]  Unit/Bed#: -01 Encounter: [id number]      Subjective:  Ailyn Muro is doing well post-operatively from a  delivery. Pain is well controlled She denies nausea and vomiting. Tolerated lunch. Denies fever/chills, chest pain, SOB, or leg pain. Baby is in the room.     Vitals:   /74 (BP Location: Right arm)   Pulse 86   Temp 98.7 °F (37.1 °C) (Temporal)   Resp 16   LMP 2024 (Exact Date)   SpO2 98%   Breastfeeding Yes     Physical Exam:   GEN: Ailyn Muro appears well, alert and oriented x 3, pleasant and cooperative   ABDOMEN: soft, no tenderness, no distention, fundus @ -2 below umbilicus, Incision C/D/I  EXTREMITIES: SCDs on        Lizabeth Wade MD  2025  1:21 PM

## 2025-02-28 NOTE — DISCHARGE SUMMARY
Discharge Summary - OB/GYN   Name: Ailyn Muro 29 y.o. female I MRN: [id number]  Unit/Bed#: -01 I Date of Admission: 2025   Date of Service: 3/3/2025 I Hospital Day: 3    ADMISSION  Patient of: [facility]  Admission Date: 2025   Admitting Attending: Dr. Lamonte Oglesby MD  Admitting Diagnoses:   Patient Active Problem List   Diagnosis    Atypical nevi    Family history of deep vein thrombosis    Anxiety    Migraine    Accessory breast tissue of axilla    Chronic bilateral low back pain without sciatica    Acute bilateral low back pain without sciatica    History of  delivery, currently pregnant    History of classical  section    Anxiety during pregnancy    Abnormal glucose tolerance test (GTT) during pregnancy, antepartum    S/P repeat low transverse     Anemia, postpartum       DELIVERY  Delivery Method: , Low Transverse   Delivery Date and Time: 2025 8:05 AM  Delivery Attending: Lamonte Oglesby    DISCHARGE  Discharge Date: 3/3/25  Discharge Attending: Dr. Muro  Discharge Diagnosis:   Same, Delivered    Clinical course: Admission to Delivery  Ailyn Muro is a 29 y.o.  who was admitted at 37w0d for scheduled repeat  section in setting of hx of prior classical  section.       Delivery  Route of Delivery: , Low Transverse  Reason for  delivery: Prior  1    Anesthesia: Spinal,     Delivery: , Low Transverse at 2025 8:05 AM    Baby's Weight: 3670 g (8 lb 1.5 oz); 129.45    Apgar scores: 8  and 9  at 1 and 5 minutes, respectively    Clinical Course: Post-Delivery:  The post delivery course was unremarkable.    On the day of discharge, the patient was ambulating, voiding spontaneously, tolerating oral intake, and hemodynamically stable. She was able to reasonably perform all ADLs. She had appropriate bowel function. Pain was well-controlled. She was discharged home on postpartum/postop  "day #3 without complications. Patient was instructed to follow up with her OB as an outpatient and was given appropriate warnings to call her provider with problems or concerns.    Pertinent lab findings included:   Blood type B+.     Last three Hgb values:  Lab Results   Component Value Date    HGB 9.1 (L) 2025    HGB 11.2 (L) 2025    HGB 11.1 (L) 2024        Problem-specific follow-up plans included the following:  Assessment & Plan  37 weeks gestation of pregnancy (Resolved: 3/2/2025)  Anesthesia consult for regional anesthesia  Ancef 2g IV for surgical prophylaxis  FU CBC, Syphilis screening, Blood Type & Screen  Abnormal glucose tolerance test (GTT) during pregnancy, antepartum  Elevated 1hr GTT of 137  3hr wnl  History of classical  section  In setting of PROM and then labor with fetus transverse  Plan for low transverse  section at 37 weeks  History of  delivery, currently pregnant  See plan under \"History of classical  section\"  S/P repeat low transverse   ; admit Hgb 11.2-> post op 9.1   Routine PP care   Pain Mgt: IBU/ APAP and roxicodone PRN   Encourage ambulation   Encourage bonding   Lactation support   Void: check void   PP contraception: unsure   Anemia, postpartum  Post op Hgb 9.1     Venofer given       Discharge med list:     Medication List      ASK your doctor about these medications     famotidine 20 mg tablet; Commonly known as: PEPCID; TAKE 1 TABLET (20 MG   TOTAL) BY MOUTH 2 (TWO) TIMES A DAY AS NEEDED FOR HEARTBURN OR INDIGESTION   hydrOXYzine HCL 25 mg tablet; Commonly known as: ATARAX; Take 1 tablet   (25 mg total) by mouth every 6 (six) hours as needed for itching   PRENATAL VITAMIN PO       Condition at discharge:   good     Disposition:   Home    Planned Readmission:   No  "

## 2025-02-28 NOTE — ANESTHESIA POSTPROCEDURE EVALUATION
Post-Op Assessment Note    CV Status:  Stable    Pain management: adequate       Mental Status:  Alert and awake   Hydration Status:  Euvolemic   PONV Controlled:  Controlled   Airway Patency:  Patent     Post Op Vitals Reviewed: Yes    No anethesia notable event occurred.    Staff: Anesthesiologist           Last Filed PACU Vitals:  Vitals Value Taken Time   Temp 97.8 °F (36.6 °C) 02/28/25 0922   Pulse 79 02/28/25 0932   /65 02/28/25 0932   Resp 16 02/28/25 0932   SpO2 99 % 02/28/25 0922       Modified Donnell:     Vitals Value Taken Time   Activity 1 02/28/25 0932   Respiration 2 02/28/25 0932   Circulation 2 02/28/25 0932   Consciousness 2 02/28/25 0932   Oxygen Saturation 2 02/28/25 0932     Modified Donnell Score: 9

## 2025-02-28 NOTE — ANESTHESIA PROCEDURE NOTES
Spinal Block    Patient location during procedure: OR  Start time: 2/28/2025 7:41 AM  Reason for block: at surgeon's request and primary anesthetic  Staffing  Performed by: Carlo Wilkes DO  Authorized by: Carlo Wilkes DO    Preanesthetic Checklist  Completed: patient identified, IV checked, risks and benefits discussed, surgical consent, monitors and equipment checked, pre-op evaluation and timeout performed  Spinal Block  Patient position: sitting  Prep: Betadine and site prepped and draped  Patient monitoring: frequent blood pressure checks, continuous pulse ox and heart rate  Approach: midline  Location: L3-4  Needle  Needle type: Pencan   Needle gauge: 24 G  Needle length: 4 in  Assessment  Sensory level: T4  Injection Assessment:  negative aspiration for heme, no paresthesia on injection and positive aspiration for clear CSF.  Post-procedure:  site cleaned

## 2025-03-01 LAB
ERYTHROCYTE [DISTWIDTH] IN BLOOD BY AUTOMATED COUNT: 13.2 % (ref 11.6–15.1)
HCT VFR BLD AUTO: 28.1 % (ref 34.8–46.1)
HGB BLD-MCNC: 9.1 G/DL (ref 11.5–15.4)
MCH RBC QN AUTO: 29.8 PG (ref 26.8–34.3)
MCHC RBC AUTO-ENTMCNC: 32.4 G/DL (ref 31.4–37.4)
MCV RBC AUTO: 92 FL (ref 82–98)
PLATELET # BLD AUTO: 195 THOUSANDS/UL (ref 149–390)
PMV BLD AUTO: 10.8 FL (ref 8.9–12.7)
RBC # BLD AUTO: 3.05 MILLION/UL (ref 3.81–5.12)
WBC # BLD AUTO: 15.26 THOUSAND/UL (ref 4.31–10.16)

## 2025-03-01 PROCEDURE — 85027 COMPLETE CBC AUTOMATED: CPT

## 2025-03-01 PROCEDURE — 99024 POSTOP FOLLOW-UP VISIT: CPT | Performed by: NURSE PRACTITIONER

## 2025-03-01 RX ORDER — IBUPROFEN 600 MG/1
600 TABLET, FILM COATED ORAL EVERY 6 HOURS SCHEDULED
Status: DISCONTINUED | OUTPATIENT
Start: 2025-03-01 | End: 2025-03-02

## 2025-03-01 RX ADMIN — OXYCODONE 5 MG: 5 TABLET ORAL at 19:44

## 2025-03-01 RX ADMIN — ACETAMINOPHEN 650 MG: 325 TABLET, FILM COATED ORAL at 06:14

## 2025-03-01 RX ADMIN — KETOROLAC TROMETHAMINE 30 MG: 30 INJECTION, SOLUTION INTRAMUSCULAR; INTRAVENOUS at 02:47

## 2025-03-01 RX ADMIN — IRON SUCROSE 200 MG: 20 INJECTION, SOLUTION INTRAVENOUS at 08:37

## 2025-03-01 RX ADMIN — KETOROLAC TROMETHAMINE 30 MG: 30 INJECTION, SOLUTION INTRAMUSCULAR; INTRAVENOUS at 08:37

## 2025-03-01 RX ADMIN — SENNOSIDES 8.6 MG: 8.6 TABLET ORAL at 08:37

## 2025-03-01 RX ADMIN — ACETAMINOPHEN 650 MG: 325 TABLET, FILM COATED ORAL at 00:27

## 2025-03-01 RX ADMIN — ACETAMINOPHEN 650 MG: 325 TABLET, FILM COATED ORAL at 18:20

## 2025-03-01 RX ADMIN — SIMETHICONE 80 MG: 80 TABLET, CHEWABLE ORAL at 14:57

## 2025-03-01 RX ADMIN — ACETAMINOPHEN 650 MG: 325 TABLET, FILM COATED ORAL at 12:27

## 2025-03-01 RX ADMIN — IBUPROFEN 600 MG: 600 TABLET, FILM COATED ORAL at 17:03

## 2025-03-01 NOTE — ASSESSMENT & PLAN NOTE
; admit Hgb 11.2-> post op 9.1   Routine PP care   Pain Mgt: IBU/ APAP and roxicodone PRN   Encourage ambulation   Encourage bonding   Lactation support   Void: check void   PP contraception: unsure

## 2025-03-01 NOTE — PLAN OF CARE

## 2025-03-01 NOTE — LACTATION NOTE
This note was copied from a baby's chart.  CONSULT - LACTATION  Baby Girl Foster 1 days female MRN: [id number]    [facility] NURSERY Room / Bed: (N)/(N) Encounter: [id number]    Maternal Information     MOTHER:  Ailyn Muro  Maternal Age: 29 y.o.  OB History: # 1 - Date: 22, Sex: Male, Weight: 1814 g (4 lb), GA: 29w5d, Type: , Classical, Apgar1: 7, Apgar5: 9, Living: Living, Birth Comments: PPROM at 28w, s/p latency antibiotics, steroid, mag. Started laboring when baby was transverse. Delivered via classical  at 29w    # 2 - Date: 25, Sex: Female, Weight: 3670 g (8 lb 1.5 oz), GA: 37w0d, Type: , Low Transverse, Apgar1: 8, Apgar5: 9, Living: Living, Birth Comments: None   Previous breast reduction surgery? No    Lactation history:   Has patient previously breast fed: Yes   How long had patient previously breast fed: Attempted, pumped for baby   Previous breast feeding complications: Infant separation (Baby was born , was in NICU for 7 weeks.)     Past Surgical History:   Procedure Laterality Date    DENTAL SURGERY      NE  DELIVERY ONLY N/A 2022    Procedure:  SECTION ();  Surgeon: Yandy Velasquez MD;  Location: [facility] ;  Service: Obstetrics    NE  DELIVERY ONLY N/A 2025    Procedure:  SECTION () REPEAT;  Surgeon: Lamonte Oglesby MD;  Location: [facility];  Service: Obstetrics       Birth information:  YOB: 2024   Time of birth: 8:05 AM   Sex: female   Delivery type: , Low Transverse   Birth Weight: 3670 g (8 lb 1.5 oz)   Percent of Weight Change: -8%     Gestational Age: 37w0d   [unfilled]    Reason for Consult:    Reason for Consult: Follow-up assessment (ext) - 20 min    Risk Factors:    Risk Factors: LPI (37 weeks)    Breast and nipple assessment:   Breasts/Nipples  Left Breast: Soft (Large)  Right Breast: Soft (Large)  Left Nipple:  Flat  Right Nipple: Everted, Flat  Intervention: Hand expression  Breastfeeding Status: Yes  Breastfeeding Progress: Not yet established    OB Lactation Tools:    Other OB Lactation Tools  Feeding Devices: Pump    Breast Pump:    Breast Pump  Pump: Manual  Initiated by: Staff    [place] Assessment:  Sleeping on mother's chest post feeding, displaying fullness cues.    Feeding assessment:  Progressively getting better, no latch assessed.    Feeding recommendations:  breast feed on demand every 2-3 hours, watching for early feeding cues. At least 8-12 feedings in 24 hours.    Followed up with Ailyn to discuss breastfeeding progress. Ailyn is a second time mom, who did not breastfeed her first baby. Patient's first baby was born  and was in the NICU for 7 weeks. Ailyn attempted to breastfeed her first but baby had difficulty latching and pumped instead.    Ailyn states that breastfeeding has been progressively getting better. Patient states that baby is sleepy at the breast and falls asleep. Patient believes she has no milk and therefore offering a breast she feels has more milk based on how much she can hand express. Patient has been using the manual to help stimulate her nipple more and express a couple of drops to help baby latch.    Discussed keeping baby stimulated at the breast to keep baby actively sucking. Reiterated to offer both breasts and to switch breasts once baby starts to slow down.   Reviewed hand expression with patient again. Teach back method was used to show patient her colostrum. FOB of baby was also hand expressing to help mother see her colostrum.  Discussed that colostrum is thick and sticky and comes in small amounts in the first few days. Reviewed that breast milk will start to transition day 3-5.  Reviewed that colostrum started to be made during pregnancy.  Reviewed baby's belly size and cluster feeding. Discussed cluster feeding is a normal baby behavior and helps bring in a good  "milk supply and helps prevent excessive weight loss.     Encouraged family to monitor baby at the breast and see if baby continues to be sleepy. Reviewed how baby is 37 weeks and may lack stamina, or tire easily at the breast. Discussed indications of when to start pumping.    Provided verbal guidance on latching and positioning baby at the breast. Patient has larger breasts, provided with wash cloths and encouraged to roll under the breast for support. Provided with \"Breastfeeding with Larger Breasts\" education handout.    Patient was encouraged to contact lactation for a latch assessment, continued support and/or questions that arise.      TRAVIS Davis  3/1/2025 3:37 PM  "

## 2025-03-01 NOTE — PROGRESS NOTES
Progress Note - OB/GYN   Name: Ailyn Muro 29 y.o. female I MRN: [id number]  Unit/Bed#: -01 I Date of Admission: 2025   Date of Service: 3/1/2025 I Hospital Day: 1     Assessment & Plan  S/P repeat low transverse   ; admit Hgb 11.2-> post op 9.1   Routine PP care   Pain Mgt: IBU/ APAP and roxicodone PRN   Encourage ambulation   Encourage bonding   Lactation support   Void: check void   PP contraception: unsure   Anemia, postpartum  Post op Hgb 9.1     Venofer ordered      She is PPD# 1 s/p  repeat  section.  Recovering well and is stable     Disposition    - Anticipate discharge home on PPD# 2-4      Subjective/Objective     Chief Complaint: Postpartum State     Subjective:    Ailyn Muro is PPD/POD#1 s/p  repeat  section. She has no current complaints.  Pain is well controlled. She is recovering well and is stable.     Breastfeeding:  yes  Tolerating PO: yes  Nausea or Vomiting: no  Voiding: yes  Flatus: yes; has had no bowel movement.   Ambulating: yes  Chest pain: no  Shortness of breath: no  Leg pain: no  Lochia: small    Vitals:   BP 98/67 (BP Location: Right arm)   Pulse 90   Temp 98.1 °F (36.7 °C) (Oral)   Resp 18   LMP 2024 (Exact Date)   SpO2 98%   Breastfeeding Yes       Intake/Output Summary (Last 24 hours) at 3/1/2025 0723  Last data filed at 3/1/2025 0401  Gross per 24 hour   Intake 1500 ml   Output 2905 ml   Net -1405 ml       Invasive Devices       Peripheral Intravenous Line  Duration             Peripheral IV 25 Distal;Dorsal (posterior);Left Forearm 1 day                    Physical Exam:   GEN: Ailyn Muro appears well, alert and oriented x 3, pleasant and cooperative   CARDIO: RRR, no murmurs or rubs  RESP:  CTAB, no wheezes or rales  ABDOMEN: soft, no tenderness, no distention, fundus @ U, Incision C/D/I; areas of ecchymosis above INC   EXTREMITIES: SCDs on, non tender, no edema, b/l Cynthia's sign negative      Labs:      Hemoglobin   Date Value Ref Range Status   03/01/2025 9.1 (L) 11.5 - 15.4 g/dL Final   02/28/2025 11.2 (L) 11.5 - 15.4 g/dL Final   06/23/2017 13.8 11.1 - 15.9 g/dL Final     WBC   Date Value Ref Range Status   03/01/2025 15.26 (H) 4.31 - 10.16 Thousand/uL Final   02/28/2025 12.40 (H) 4.31 - 10.16 Thousand/uL Final   06/23/2017 7.9 3.4 - 10.8 x10E3/uL Final     Platelets   Date Value Ref Range Status   03/01/2025 195 149 - 390 Thousands/uL Final   02/28/2025 242 149 - 390 Thousands/uL Final   06/23/2017 254 150 - 379 x10E3/uL Final     Creatinine   Date Value Ref Range Status   02/28/2025 0.46 (L) 0.60 - 1.30 mg/dL Final     Comment:     Standardized to IDMS reference method   04/07/2024 0.66 0.60 - 1.30 mg/dL Final     Comment:     Standardized to IDMS reference method     AST   Date Value Ref Range Status   04/07/2024 14 13 - 39 U/L Final   12/12/2022 15 10 - 30 U/L Final   07/18/2022 14 13 - 39 U/L Final     Comment:     Specimen collection should occur prior to Sulfasalazine administration due to the potential for falsely depressed results.    11/12/2021 30 10 - 30 U/L Final     ALT   Date Value Ref Range Status   04/07/2024 13 7 - 52 U/L Final     Comment:     Specimen collection should occur prior to Sulfasalazine administration due to the potential for falsely depressed results.    12/12/2022 17 6 - 29 U/L Final   07/18/2022 11 7 - 52 U/L Final     Comment:     Specimen collection should occur prior to Sulfasalazine administration due to the potential for falsely depressed results.    11/12/2021 36 (H) 6 - 29 U/L Final          DANIEL Herbert  3/1/2025  7:23 AM

## 2025-03-02 PROBLEM — Z3A.37 37 WEEKS GESTATION OF PREGNANCY: Status: RESOLVED | Noted: 2024-12-27 | Resolved: 2025-03-02

## 2025-03-02 PROCEDURE — 99024 POSTOP FOLLOW-UP VISIT: CPT | Performed by: OBSTETRICS & GYNECOLOGY

## 2025-03-02 RX ORDER — IBUPROFEN 600 MG/1
600 TABLET, FILM COATED ORAL EVERY 6 HOURS
Status: DISCONTINUED | OUTPATIENT
Start: 2025-03-02 | End: 2025-03-03 | Stop reason: HOSPADM

## 2025-03-02 RX ADMIN — IBUPROFEN 600 MG: 600 TABLET, FILM COATED ORAL at 15:25

## 2025-03-02 RX ADMIN — SIMETHICONE 80 MG: 80 TABLET, CHEWABLE ORAL at 13:56

## 2025-03-02 RX ADMIN — SIMETHICONE 80 MG: 80 TABLET, CHEWABLE ORAL at 00:01

## 2025-03-02 RX ADMIN — SENNOSIDES 8.6 MG: 8.6 TABLET ORAL at 08:04

## 2025-03-02 RX ADMIN — ACETAMINOPHEN 650 MG: 325 TABLET, FILM COATED ORAL at 17:51

## 2025-03-02 RX ADMIN — IBUPROFEN 600 MG: 600 TABLET, FILM COATED ORAL at 05:57

## 2025-03-02 RX ADMIN — IBUPROFEN 600 MG: 600 TABLET, FILM COATED ORAL at 21:57

## 2025-03-02 RX ADMIN — SIMETHICONE 80 MG: 80 TABLET, CHEWABLE ORAL at 08:04

## 2025-03-02 RX ADMIN — ACETAMINOPHEN 650 MG: 325 TABLET, FILM COATED ORAL at 00:01

## 2025-03-02 RX ADMIN — IBUPROFEN 600 MG: 600 TABLET, FILM COATED ORAL at 00:02

## 2025-03-02 RX ADMIN — ACETAMINOPHEN 650 MG: 325 TABLET, FILM COATED ORAL at 12:13

## 2025-03-02 RX ADMIN — ACETAMINOPHEN 650 MG: 325 TABLET, FILM COATED ORAL at 05:57

## 2025-03-02 RX ADMIN — SIMETHICONE 80 MG: 80 TABLET, CHEWABLE ORAL at 17:52

## 2025-03-02 NOTE — PROGRESS NOTES
"Progress Note - OB/GYN   Name: Ailny Muro 29 y.o. female I MRN: [id number]  Unit/Bed#: -01 I Date of Admission: 2025   Date of Service: 3/2/2025 I Hospital Day: 2     Assessment & Plan  S/P repeat low transverse   ; admit Hgb 11.2-> post op 9.1   Routine PP care   Pain Mgt: IBU/ APAP and roxicodone PRN   Encourage ambulation   Encourage bonding   Lactation support   Void: check void   PP contraception: unsure   Anemia, postpartum  Post op Hgb 9.1     Venofer ordered     History of  delivery, currently pregnant  See plan under \"History of classical  section\"  History of classical  section  In setting of PROM and then labor with fetus transverse  Plan for low transverse  section at 37 weeks  Abnormal glucose tolerance test (GTT) during pregnancy, antepartum  Elevated 1hr GTT of 137  3hr wnl  37 weeks gestation of pregnancy (Resolved: 3/2/2025)      OB Post-Partum Progress Note  Subjective   Post delivery. Patient is doing well. Lochia WNL. Pain well controlled. She feels overall well.    Pain: yes, cramping, improved with meds  Tolerating PO: yes  Voiding: yes  Flatus: no  BM: no  Ambulating: yes  Breastfeeding:  yes  Chest pain: no  Shortness of breath: no  Leg pain: no  Lochia: WNL    Objective :  Temp:  [98.1 °F (36.7 °C)-98.6 °F (37 °C)] 98.2 °F (36.8 °C)  HR:  [76-88] 80  BP: ()/(62-73) 106/73  Resp:  [12-16] 16  SpO2:  [97 %-98 %] 97 %  O2 Device: None (Room air)    Physical Exam  Constitutional:       Appearance: Normal appearance.   HENT:      Head: Normocephalic and atraumatic.   Cardiovascular:      Rate and Rhythm: Normal rate.   Pulmonary:      Effort: Pulmonary effort is normal. No respiratory distress.   Abdominal:      Palpations: Abdomen is soft.      Tenderness: There is no abdominal tenderness.      Comments: Incision C/D/I   Musculoskeletal:         General: Normal range of motion.   Skin:     General: Skin is warm and dry. "   Neurological:      Mental Status: She is alert.           Lab Results: I have reviewed the following results:  Lab Results   Component Value Date    WBC 15.26 (H) 03/01/2025    HGB 9.1 (L) 03/01/2025    HCT 28.1 (L) 03/01/2025    MCV 92 03/01/2025     03/01/2025

## 2025-03-02 NOTE — LACTATION NOTE
This note was copied from a baby's chart.  CONSULT - LACTATION  Baby Girl Foster 2 days female MRN: [id number]    [facility] NURSERY Room / Bed: (N)/(N) Encounter: [id number]    Maternal Information     MOTHER:  Ailyn Muro  Maternal Age: 29 y.o.  OB History: # 1 - Date: 22, Sex: Male, Weight: 1814 g (4 lb), GA: 29w5d, Type: , Classical, Apgar1: 7, Apgar5: 9, Living: Living, Birth Comments: PPROM at 28w, s/p latency antibiotics, steroid, mag. Started laboring when baby was transverse. Delivered via classical  at 29w    # 2 - Date: 25, Sex: Female, Weight: 3670 g (8 lb 1.5 oz), GA: 37w0d, Type: , Low Transverse, Apgar1: 8, Apgar5: 9, Living: Living, Birth Comments: None   Previous breast reduction surgery? No    Lactation history:   Has patient previously breast fed: Yes   How long had patient previously breast fed: Attempted/ then expressed milk due to no latch after NICU   Previous breast feeding complications: Infant separation, Exclusive pump and bottle fed     Past Surgical History:   Procedure Laterality Date    DENTAL SURGERY      ME  DELIVERY ONLY N/A 2022    Procedure:  SECTION ();  Surgeon: Yandy Velasquez MD;  Location: [facility] ;  Service: Obstetrics    ME  DELIVERY ONLY N/A 2025    Procedure:  SECTION () REPEAT;  Surgeon: Lamonte Oglesby MD;  Location: [facility];  Service: Obstetrics       Birth information:  YOB: 2024   Time of birth: 8:05 AM   Sex: female   Delivery type: , Low Transverse   Birth Weight: 3670 g (8 lb 1.5 oz)   Percent of Weight Change: -12%     Gestational Age: 37w0d   [unfilled]    Reason for Consult:    Reason for Consult: Follow-up assessment (ext) - 20 min, Latch Assess (ext) - 20 min, Pump Setup - 15 min, Alternate Feeding - 10 min, Discharge (routine) - 10 min    Risk Factors:    Risk Factors: Maternal anatomy  (large, pendulous breasts: small, R flat nipple that everts; L small, flat, that everts with undefined nipple boundries)    Breast and nipple assessment:   Breasts/Nipples  Date Pumping Initiated: 25 (multi-user pump)  Time Pumping Initiated: 1133  Left Breast: Filling, Tender (large, pendulous)  Right Breast: Filling, Tender (Large / pendulous)  Left Nipple: Flat, Everted, Tender (undefined boundries; with nipple stimulation, upper quadrant everts; nipples appears to be in backwards L shape)  Right Nipple: Flat, Everted (short shank)  Intervention: Lanolin, Breast pump, Breast shells, Hand expression  Breastfeeding Status: Yes  Breastfeeding Progress: Breastfeeding well, Not yet established (anxious)    OB Lactation Tools:    Other OB Lactation Tools  Feeding Devices: Pump, Shells, Lanolin, Syringe, Other (Comment) (handpump)    Breast Pump:    Breast Pump  Pump: Manual, Electric, Personal (21 mm flanges)  Pump Review/Education: Setup, frequency, and cleaning, Milk storage, Other (Comment) (cycle and hands on pumping)  Initiated by: Griselda Tse  Date Initiated: 25       Assessment: restricted tongue movement    Feeding assessment:  feeding plan created during the consult.  LATCH:  Latch: Repeated attempts, hold nipple in mouth, stimulate to suck   Audible Swallowing: Spontaneous and intermittent (24 hours old)   Type of Nipple: Everted (After stimulation)   Comfort (Breast/Nipple): Soft/non-tender   Hold (Positioning): Partial assist, teach one side, mother does other, staff holds   LATCH Score: 8       [place]:    Appearance Items  Appearance of tongue when lifted: Heart or V-shaped  Elasticity of frenulum: Moderately elastic  Length of lingual frenulum when tongue lifted: Slight cleft in tip apparent  Attachment of lingual frenulum to tongue:  (posterior to 2/3rds to anterior portion of the tongue)  Attachment of lingual frenulum to tongue: Attached just below ridge  Appearance of tongue  when lifted: Class III: severe, 3-7 mm    Function Items  Lateralization: None  Lift of tongue: Tip stays at lower alveloar ridge or rises to mid-mouth only with jaw closure  Extension of tongue: Tip over lower gum only  Spread of anterior tongue: Moderate or partial  Cupping: Side edges only, moderate cup  Peristalsis: Partial, originiating posterior to tip  Snapback: Periodic  Function Score: 5         Feeding recommendations:   feeding plan created with consult.   Ailyn called out stating Domenico is ready to feed.     Ailyn brought Domenico up to the left breast in a football hold. Shallow latch noted. Ailyn did not stimulate the breasts and nipple prior to latching. Domenico is beneath the breast and to far in front of the breast to latch successfully.     Demonstration and teach back of football and cross cradle on the L breast and cross cradle on the R breast.     Latch After Lactation Education/Consult:  Efficiency: Cross cradle on the L / R breast              Lips Flanged: Yes, May need to flange upper and lower lip if latch is shallow or Domenico is not stimulated              Depth of latch: better with demonstration of U shape hold of the breast, alignment of nipple to nose, chin behind the areola, and wide mouth. Bring Domenico to the breast, not the breast to Domenico              Audible Swallow: Yes, on both breasts              Visible Milk: Yes, with HE after the breastfeeding session              Wide Open/ Asymmetrical: Yes              Suck Swallow Cycle: Breathing: yes, Coordinated: yes  Nipple Assessment after latch: Normal: elongated/eraser, no discoloration and no damage noted.  Latch Problems: alignment, snug hold,. Nipple to nose, chin to breast, how to achieve a deep latch    Position After Lactation Education/Consult:  Infant's Ergonomics/Body: cross cradle on the L/R breast               Body Alignment: no, Ailyn moves the breast so the breast tissue blocks her view of the baby and the latch.;  "pillows to support mom's body and bringing baby to the breast               Head Supported: Yes, enc. Snug hold               Close to Mom's body/ Lifted/ Supported: Yes, with demonstration of ear, shoulder, hip alignment               Mom's Ergonomics/Body: Yes, with pillows to lift the breast; lower lumbar support                           Supported: Yes, with additional pillows                           Sitting Back: Yes, with support provided                           Brings Baby to her breast: Yes, with reminders and demonstration  Positioning Problems: ear, shoulder, hip alignment, U shape hold of the breast, Nipple to nose, chin to breast. Wait for a wide mouth and deep latch.     Feeding Plan:     Due to mom's anxiety about Domenico and not knowing if she is getting enough, Demonstration of triple feeding was provided.     Mix Feeds:   Start every feeding at the breast. Offer both breasts or one breast and use breast compressions to achieve active suckling. Once baby is not actively suckling, bring baby in upright position and offer expressed milk and/or artificial supplementation via alternative feeding method (syringe, finger, paced bottle feeding). Burp frequently between breasts and during paced bottle feeding. Once feed is complete, place baby back on breast for on-nutritive suck. Pump after the feeding session to supplement with expressed milk at next feed.    Nurse on demand: when baby gives hunger cues; when your breasts feel full, or at least every 3 hours during the day and every 5 hours at night counting from the beginning of one feeding to the beginning of the next; which ever comes first. When sucking and swallowing slow, gently compress the breast to restart flow. If active suck-swallow does not restart, gently remove the baby and offer the other breast; offering up to \"four\" breasts per feeding.    Education of breastfeeding with large breasts. Demonstration and teach back of positioning and " "alignment. Use pillows, tables, rolled towels/blankets to lift breast. Lift baby up to breast level. Education on hand expression prior to latch, positioning of hand to compress the breast, and positioning and alignment of baby for deep latch with large breasts.     Mom's nipple everts with stimulation. With nipple compression, short shank noted. Education on ways to elongate the nipple: Hand expression, Latch assist, breast shells, supple cups, manual and electric pump stimulation.     Education on positioning and alignment. Mom is encouraged to:     - Bring baby up to the breast (use of pillows to elevate so baby's torso is against mom's breasts)   - Skin to skin for feedings with top hand exposed to show signs of satiation   - Chin deep into breast tissue (make baby look up to the nipple)   - nose aligned to the nipple   -Wait for wide gape, place chin behind the areola on the breast so nipple is at the nose. Once baby opens their mouth wide, the nipple will be aimed at the upper, back palate  - Cheeks should be touching breast, and baby should have a long neck   - Ear, shoulder, hip will be in alignment   - Deep, snug hold of baby up to the breast   - Every baby knows how to breathe at the breast. The tip of the nose may appear to touch the breast. Babies breathe from the side of the nasal passages. If baby can not breathe due to improper alignment, baby will unlatch    Education on creating a snug hold of your infant to the breast by verifying the infant's cheek is touching the breast, your infant's chin is deep into the breast tissue, your infant's arms are \"hugging\" the breast, and your infant's lips are flanged on the areola. Bring infant to the breast, not your breast to the infant. Latch should feel like a tugging sensation on the nipple.    Assistance with flanging baby's lips on the breast. Demonstration and teach-back of tugging on chin to flange lower lip and pressing digit into the breast, rolling " digit under the upper lip and away from the oral cavity to flange upper lip while baby is latched. Education on how to achieve flanged lips with initial latch and positioning to the breast. Encouraged to un-latch and re-latch baby if mom has pain, discomfort, or if latch appears shallow. Encouraged to call lactation for assistance.     Demonstrated with teach back breast compressions during a feeding to increase milk transfer and stimulate suckling after a breathing/muscle break.     Signs  Reviewed early signs of hunger, including tensing of hands and shoulders - no need to wait for open eyes.  Crying is a late hunger sign.  If baby is crying, soothe baby first and then attempt to latch.  Reviewed normal sucking patterns: transition from stimulation to nutritive to release or non-nutritive. The goal is to see and hear lots of swallowing.  Reviewed normal nursing pattern: infant could latch on one breast up to 30 minutes or until releases on own. Signs of satiation is open hand with fingers that do not grab your finger.  Discussed difference in sensation of non-nutritive v nutritive sucking    Timing of feeds  - Start feedings on breast that last feeding ended   - allow no more than 3 hours between breast feeding sessions   - time between feedings is counted from the beginning of the first feed to the beginning of the next feeding session    Feeding Plan     1. Meet early feeding cues  2. Use hand expression and nipple rolling techniques to assist with milk flow.  3. Use massage, warmth, to stimulate breasts  4. Use pillows to bring baby to the breast (shoulders back, lower back support). Make sure you can see the latch.   5. Bring baby to breast skin to skin  6. Have baby's chest against mom's torso. Baby's chin should be deeply into the breast, and nose should touch the nipple. This position will  assist with deeper latch**  7. Place opposite hand under the breast and grab the breast like a taco. Your thumb should  be in front of the baby's nose and behind the areola. Move baby not breast, and bring baby to breast when mouth is wide and deep latch is achieved.  8. Allow baby to stay on the breast for up to 30 min.   9. Look for signs of satiation. If baby is not satiated and latch was painful, use expressed milk via paced bottle feeding method.   10. Place baby on opposite breast after bottle feeding for non-nutritive suck and to create supply.  11. Start next feeding on the breast the feed finished (2nd breast).    (Scan QR code for [employer] - positions)     [employer] - positions      If baby does not meet diaper output  1. If baby does not suck with stimulation, becomes fussy, or un-latches, use breast pump to express milk.   2.  Feed expressed milk via paced bottle feeding method. Review Milkmob on youtube or scan QR code for MilkMob video  3. Bring baby back to opposite breast for non-nutritive suck and skin to skin  4. Pump after each feed to stimulate breasts and have expressed milk for next feed       Milk Mob     10. Move baby to the opposite breast and follow steps 1-8 to latch deeply.   11. Pump after each feed to stimulate breasts and have expressed milk for next feeding. Do not pump for more than 10 min.     Education on alternative feeding methods. Encouraged to call lactation for additional assistance with feedings.    Pumping:   - When pumping, begin in stimulation mode (high cycle, low vacuum) until milk begins to express. Change pump to expression mode (low cycle, high vacuum). Use hands on pumping techniques to assist with milk transfer. When milk stops expressing, change back to stimulation mode. When milk begins to flow, change to expression mode. You make cycle pump up to three times in a pumping session.    Spectra Education on turning on the pump, press the 3 wavy lines to place pump on stimulation mode (high cycle, low vacuum) Set vacuum to comfort with light  suction. After 3 min, press 3 wavy lines and change setting to Expression mode (low Cycle, High vacuum) Vacuum setting should not pinch, only tug the nipple. Now pump is set. Next time mom pumps, will only need to turn on pump and press 3 wavy line button to change cycle three times in a pumping session.     Ed. On 3rd party distributors that offer different flanges on-line. ie) [employer]. Names of reputable companies like [employer] and [employer] offer flanges for every double electric pump. Lactation Hub will also provide a set of 12 mm to 19 mm flanges to fit most flanges.Enc. To try smaller flange and place a small amount of lanolin where the tunnel and funnel meet.     Ed. On various flange sizes. Ed. On various flange circumferences that may fit the mother's breast better. Ed. On possible use of Pumping Pals or [employer] flanges. Encouraged parents to call lactation once edema has dissipated to reassess the flange sizing.       (Scan QR code for [employer] - positions)       [employer] - positions      Christy Hume, MA 3/2/2025 12:09 PM

## 2025-03-02 NOTE — NURSING NOTE
Discharge teaching completed, appropriate questions asked and answered, Save a life magnet given.

## 2025-03-02 NOTE — ASSESSMENT & PLAN NOTE
Health Maintenance Due   Topic Date Due   • DTaP/Tdap/Td Vaccine (1 - Tdap) 12/11/1952   • Shingles Vaccine (1 of 2) 12/11/1984   • Influenza Vaccine (1) 09/01/2020   • Medicare Wellness Visit  11/14/2020       Patient is due for topics as listed above but is not proceeding with Immunization(s) Dtap/Tdap/Td and Shingles at this time.         Over the last 2 weeks, how often have you been bothered by the following problems?          PHQ2 Score: 0  PHQ2 Score Interpretation: No further screening needed  1. Little interest or pleasure in activity?: 0  2. Feeling down, depressed, or hopeless?: 0         DEPRESSION ASSESSMENT/PLAN:  Depression screening is negative no further plan needed.             In setting of PROM and then labor with fetus transverse  Plan for low transverse  section at 37 weeks

## 2025-03-02 NOTE — PLAN OF CARE
Problem: POSTPARTUM  Goal: Experiences normal postpartum course  Description: INTERVENTIONS:  - Monitor maternal vital signs  - Assess uterine involution and lochia  Outcome: Progressing  Goal: Appropriate maternal -  bonding  Description: INTERVENTIONS:  - Identify family support  - Assess for appropriate maternal/infant bonding   -Encourage maternal/infant bonding opportunities  - Referral to  or  as needed  Outcome: Progressing  Goal: Establishment of infant feeding pattern  Description: INTERVENTIONS:  - Assess breast/bottle feeding  - Refer to lactation as needed  Outcome: Progressing  Goal: Incision(s), wounds(s) or drain site(s) healing without S/S of infection  Description: INTERVENTIONS  - Assess and document dressing, incision, wound bed, drain sites and surrounding tissue  - Provide patient and family education    Outcome: Progressing     Problem: PAIN - ADULT  Goal: Verbalizes/displays adequate comfort level or baseline comfort level  Description: Interventions:  - Encourage patient to monitor pain and request assistance  - Assess pain using appropriate pain scale  - Administer analgesics based on type and severity of pain and evaluate response  - Implement non-pharmacological measures as appropriate and evaluate response  - Consider cultural and social influences on pain and pain management  - Notify physician/advanced practitioner if interventions unsuccessful or patient reports new pain  Outcome: Progressing     Problem: INFECTION - ADULT  Goal: Absence or prevention of progression during hospitalization  Description: INTERVENTIONS:  - Assess and monitor for signs and symptoms of infection  - Monitor lab/diagnostic results  - Monitor all insertion sites, i.e. indwelling lines, tubes, and drains  - Monitor endotracheal if appropriate and nasal secretions for changes in amount and color  - [place] appropriate cooling/warming therapies per order  - Administer  medications as ordered  - Instruct and encourage patient and family to use good hand hygiene technique  - Identify and instruct in appropriate isolation precautions for identified infection/condition  Outcome: Progressing  Goal: Absence of fever/infection during neutropenic period  Description: INTERVENTIONS:  - Monitor WBC    Outcome: Progressing     Problem: SAFETY ADULT  Goal: Patient will remain free of falls  Description: INTERVENTIONS:  - Keep Call bell within reach  - Keep bed low and locked with side rails adjusted as appropriate  - Keep care items and personal belongings within reach  - Initiate and maintain comfort rounds  - Apply yellow socks and bracelet for high fall risk patients  - Consider moving patient to room near nurses station  Outcome: Progressing  Goal: Maintain or return to baseline ADL function  Description: INTERVENTIONS:  - Provide patient education as appropriate  Outcome: Progressing  Goal: Maintains/Returns to pre admission functional level  Description: INTERVENTIONS:  - Record patient progress and toleration of activity level   Outcome: Progressing     Problem: Knowledge Deficit  Goal: Patient/family/caregiver demonstrates understanding of disease process, treatment plan, medications, and discharge instructions  Description: Complete learning assessment and assess knowledge base.  Interventions:  - Provide teaching at level of understanding  - Provide teaching via preferred learning methods  Outcome: Progressing     Problem: DISCHARGE PLANNING  Goal: Discharge to home or other facility with appropriate resources  Description: INTERVENTIONS:  - Identify barriers to discharge w/patient and caregiver  - Arrange for needed discharge resources and transportation as appropriate  - Identify discharge learning needs (meds, wound care, etc.)  - Arrange for interpretive services to assist at discharge as needed  - Refer to Case Management Department for coordinating discharge planning if the  patient needs post-hospital services based on physician/advanced practitioner order or complex needs related to functional status, cognitive ability, or social support system  Outcome: Progressing

## 2025-03-02 NOTE — LACTATION NOTE
This note was copied from a baby's chart.  CONSULT - LACTATION  Baby Girl Foster 2 days female MRN: [id number]    [facility] NURSERY Room / Bed: (N)/(N) Encounter: [id number]    Maternal Information     MOTHER:  Ailyn Muro  Maternal Age: 29 y.o.  OB History: # 1 - Date: 22, Sex: Male, Weight: 1814 g (4 lb), GA: 29w5d, Type: , Classical, Apgar1: 7, Apgar5: 9, Living: Living, Birth Comments: PPROM at 28w, s/p latency antibiotics, steroid, mag. Started laboring when baby was transverse. Delivered via classical  at 29w    # 2 - Date: 25, Sex: Female, Weight: 3670 g (8 lb 1.5 oz), GA: 37w0d, Type: , Low Transverse, Apgar1: 8, Apgar5: 9, Living: Living, Birth Comments: None   Previous breast reduction surgery? No    Lactation history:   Has patient previously breast fed: Yes   How long had patient previously breast fed: att. / then expressed milk due to no latch after NICU   Previous breast feeding complications: Infant separation (NICU)     Past Surgical History:   Procedure Laterality Date    DENTAL SURGERY      CA  DELIVERY ONLY N/A 2022    Procedure:  SECTION ();  Surgeon: Yandy Velasquez MD;  Location: [facility] ;  Service: Obstetrics    CA  DELIVERY ONLY N/A 2025    Procedure:  SECTION () REPEAT;  Surgeon: Lamonte Oglesby MD;  Location: [facility];  Service: Obstetrics       Birth information:  YOB: 2024   Time of birth: 8:05 AM   Sex: female   Delivery type: , Low Transverse   Birth Weight: 3670 g (8 lb 1.5 oz)   Percent of Weight Change: -12%     Gestational Age: 37w0d   [unfilled]    Reason for Consult:    Reason for Consult: Follow-up assessment (routine) -10 min, Discharge (routine) - 10 min    Risk Factors:    Risk Factors:  (lg. % wt. loss)    Breast and nipple assessment:   Breasts/Nipples  Date Pumping Initiated: 25 (hand pump)  Left  Breast: Filling  Right Breast: Filling  Left Nipple: Tender  Right Nipple: Tender  Intervention: Hand expression (hand pump)  Breastfeeding Status: Yes  Breastfeeding Progress: Not yet established (ailyn is weepy)    OB Lactation Tools:    Other OB Lactation Tools  Feeding Devices: Other (Comment) (hand pump)    Breast Pump:    Breast Pump  Pump: Manual, Personal (s2)  Pump Review/Education: Setup, frequency, and cleaning, Milk storage, Other (Comment) (cycle and hands on pumping)  Initiated by: Griselda Peters 3/1/25  Date Initiated: 25      [place] Assessment: FOB states baby just finished eating off the second breast - baby is in fob's arms asleep    Feeding recommendations:   Ailyn is weepy and is uncertain if baby is getting enough. FOB states output is changing color and is greenish.   Ailyn states her breasts feel a little sarah. Desires to have baby's weight increase.     Offered to do a breast and feeding assessment: Ailyn states she will call lactation for the next feeding.     Ed. On transitional stage of breast milk as well as signs of satiation and output to know if baby is getting enough. Ed. On cluster feeding and next steps in the cycle of lactation and creating a milk supply.      Enc. And reassurance provided.     Enc. To call lactation for next feed to create a feeding plan/goal.    Discussed 2nd night syndrome and ways to calm infant. Hand out given. Information on hand expression given. Discussed benefits of knowing how to manually express breast including stimulating milk supply, softening nipple for latch and evacuating breast in the event of engorgement.    Signs  Reviewed early signs of hunger, including tensing of hands and shoulders - no need to wait for open eyes.  Crying is a late hunger sign.  If baby is crying, soothe baby first and then attempt to latch.  Reviewed normal sucking patterns: transition from stimulation to nutritive to release or non-nutritive. The goal is to see  and hear lots of swallowing.  Reviewed normal nursing pattern: infant could latch on one breast up to 30 minutes or until releases on own. Signs of satiation is open hand with fingers that do not grab your finger.  Discussed difference in sensation of non-nutritive v nutritive sucking    Timing of feeds  - Start feedings on breast that last feeding ended   - allow no more than 3 hours between breast feeding sessions   - time between feedings is counted from the beginning of the first feed to the beginning of the next feeding session    Mom is encouraged to meet early feeding cues, allow for non-nutritive suck, use breast compressions, and monitor baby's output. Mom wants to review feeding logs to verify if baby is meeting daily output of wet and soiled diapers.     Milk Supply:   - Allow for non-nutritive suck at the breast to stimulate supply   - Allow for skin to skin during and after each breastfeeding session   - Use massage, heat, and hand expression prior to feedings to assist with deep latch   - Increase pumping sessions and pump after every feeding        Christy Hume, MA 3/2/2025 9:40 AM

## 2025-03-02 NOTE — PLAN OF CARE

## 2025-03-03 ENCOUNTER — TELEPHONE (OUTPATIENT)
Age: 30
End: 2025-03-03

## 2025-03-03 VITALS
OXYGEN SATURATION: 98 % | TEMPERATURE: 97.8 F | SYSTOLIC BLOOD PRESSURE: 112 MMHG | HEART RATE: 75 BPM | RESPIRATION RATE: 16 BRPM | DIASTOLIC BLOOD PRESSURE: 72 MMHG

## 2025-03-03 PROCEDURE — 99024 POSTOP FOLLOW-UP VISIT: CPT | Performed by: OBSTETRICS & GYNECOLOGY

## 2025-03-03 RX ORDER — ACETAMINOPHEN 325 MG/1
650 TABLET ORAL ONCE
Status: COMPLETED | OUTPATIENT
Start: 2025-03-03 | End: 2025-03-03

## 2025-03-03 RX ORDER — OXYCODONE HYDROCHLORIDE 5 MG/1
5 TABLET ORAL EVERY 4 HOURS PRN
Start: 2025-03-03 | End: 2025-03-13

## 2025-03-03 RX ORDER — IBUPROFEN 600 MG/1
600 TABLET, FILM COATED ORAL EVERY 6 HOURS
Qty: 30 TABLET | Refills: 0 | Status: SHIPPED | OUTPATIENT
Start: 2025-03-03

## 2025-03-03 RX ORDER — BENZOCAINE/MENTHOL 6 MG-10 MG
1 LOZENGE MUCOUS MEMBRANE DAILY PRN
Start: 2025-03-03

## 2025-03-03 RX ORDER — ACETAMINOPHEN 325 MG/1
650 TABLET ORAL EVERY 6 HOURS SCHEDULED
Qty: 2 TABLET | Refills: 0 | Status: CANCELLED | OUTPATIENT
Start: 2025-03-03 | End: 2025-03-04

## 2025-03-03 RX ADMIN — ACETAMINOPHEN 650 MG: 325 TABLET, FILM COATED ORAL at 12:56

## 2025-03-03 RX ADMIN — IBUPROFEN 600 MG: 600 TABLET, FILM COATED ORAL at 09:38

## 2025-03-03 RX ADMIN — IBUPROFEN 600 MG: 600 TABLET, FILM COATED ORAL at 04:14

## 2025-03-03 RX ADMIN — ACETAMINOPHEN 650 MG: 325 TABLET, FILM COATED ORAL at 00:05

## 2025-03-03 RX ADMIN — ACETAMINOPHEN 650 MG: 325 TABLET, FILM COATED ORAL at 06:10

## 2025-03-03 RX ADMIN — SENNOSIDES 8.6 MG: 8.6 TABLET ORAL at 09:38

## 2025-03-03 RX ADMIN — SIMETHICONE 80 MG: 80 TABLET, CHEWABLE ORAL at 00:07

## 2025-03-03 NOTE — PLAN OF CARE
Problem: POSTPARTUM  Goal: Experiences normal postpartum course  Description: INTERVENTIONS:  - Monitor maternal vital signs  - Assess uterine involution and lochia  Outcome: Adequate for Discharge  Goal: Appropriate maternal -  bonding  Description: INTERVENTIONS:  - Identify family support  - Assess for appropriate maternal/infant bonding   -Encourage maternal/infant bonding opportunities  - Referral to  or  as needed  Outcome: Adequate for Discharge  Goal: Establishment of infant feeding pattern  Description: INTERVENTIONS:  - Assess breast/bottle feeding  - Refer to lactation as needed  Outcome: Adequate for Discharge  Goal: Incision(s), wounds(s) or drain site(s) healing without S/S of infection  Description: INTERVENTIONS  - Assess and document dressing, incision, wound bed, drain sites and surrounding tissue  - Provide patient and family education    Outcome: Adequate for Discharge     Problem: PAIN - ADULT  Goal: Verbalizes/displays adequate comfort level or baseline comfort level  Description: Interventions:  - Encourage patient to monitor pain and request assistance  - Assess pain using appropriate pain scale  - Administer analgesics based on type and severity of pain and evaluate response  - Implement non-pharmacological measures as appropriate and evaluate response  - Consider cultural and social influences on pain and pain management  - Notify physician/advanced practitioner if interventions unsuccessful or patient reports new pain  Outcome: Adequate for Discharge     Problem: INFECTION - ADULT  Goal: Absence or prevention of progression during hospitalization  Description: INTERVENTIONS:  - Assess and monitor for signs and symptoms of infection  - Monitor lab/diagnostic results  - Monitor all insertion sites, i.e. indwelling lines, tubes, and drains  - Monitor endotracheal if appropriate and nasal secretions for changes in amount and color  - [place] appropriate  cooling/warming therapies per order  - Administer medications as ordered  - Instruct and encourage patient and family to use good hand hygiene technique  - Identify and instruct in appropriate isolation precautions for identified infection/condition  Outcome: Adequate for Discharge  Goal: Absence of fever/infection during neutropenic period  Description: INTERVENTIONS:  - Monitor WBC    Outcome: Adequate for Discharge     Problem: SAFETY ADULT  Goal: Patient will remain free of falls  Description: INTERVENTIONS:  - Keep Call bell within reach  - Keep bed low and locked with side rails adjusted as appropriate  - Keep care items and personal belongings within reach  - Initiate and maintain comfort rounds  - Apply yellow socks and bracelet for high fall risk patients  - Consider moving patient to room near nurses station  Outcome: Adequate for Discharge  Goal: Maintain or return to baseline ADL function  Description: INTERVENTIONS:  - Provide patient education as appropriate  Outcome: Adequate for Discharge  Goal: Maintains/Returns to pre admission functional level  Description: INTERVENTIONS:  - Record patient progress and toleration of activity level   Outcome: Adequate for Discharge     Problem: Knowledge Deficit  Goal: Patient/family/caregiver demonstrates understanding of disease process, treatment plan, medications, and discharge instructions  Description: Complete learning assessment and assess knowledge base.  Interventions:  - Provide teaching at level of understanding  - Provide teaching via preferred learning methods  Outcome: Adequate for Discharge     Problem: DISCHARGE PLANNING  Goal: Discharge to home or other facility with appropriate resources  Description: INTERVENTIONS:  - Identify barriers to discharge w/patient and caregiver  - Arrange for needed discharge resources and transportation as appropriate  - Identify discharge learning needs (meds, wound care, etc.)  - Arrange for interpretive services  to assist at discharge as needed  - Refer to Case Management Department for coordinating discharge planning if the patient needs post-hospital services based on physician/advanced practitioner order or complex needs related to functional status, cognitive ability, or social support system  Outcome: Adequate for Discharge

## 2025-03-03 NOTE — PROGRESS NOTES
"Progress Note - OB/GYN   Name: Ailyn Muro 29 y.o. female I MRN: [id number]  Unit/Bed#: -01 I Date of Admission: 2025   Date of Service: 3/3/2025 I Hospital Day: 3     Assessment & Plan  S/P repeat low transverse   ; admit Hgb 11.2-> post op 9.1   Routine PP care   Pain Mgt: IBU/ APAP and roxicodone PRN   Encourage ambulation   Encourage bonding   Lactation support   Void: check void   PP contraception: unsure   Anemia, postpartum  Post op Hgb 9.1     Venofer given     History of  delivery, currently pregnant  See plan under \"History of classical  section\"  History of classical  section  In setting of PROM and then labor with fetus transverse  Abnormal glucose tolerance test (GTT) during pregnancy, antepartum  Elevated 1hr GTT of 137  3hr wnl    OB Post-Partum Progress Note  Subjective   Ailyn Muro has no current complaints and had no acute events overnight. Her pain is well controlled. She is currently voiding. She is ambulating. Patient is currently passing flatus and has had no a bowel movement. She is tolerating PO, and denies nausea or vomitting. She denies fever, chills, chest pain, shortness of breath, or calf tenderness. Lochia is normal. She is Breastfeeding.     Objective :  Temp:  [97.8 °F (36.6 °C)-98 °F (36.7 °C)] 97.8 °F (36.6 °C)  HR:  [73-79] 75  BP: (100-112)/(71-72) 112/72  Resp:  [16-18] 16  SpO2:  [97 %-98 %] 98 %  O2 Device: None (Room air)    Physical Exam  GEN: Ailyn Muro appears well, alert and oriented x 3, pleasant and cooperative   CARDIO: Regular Rate  RESP:  Non-labored breathing  ABDOMEN: soft, appropriate tenderness, firm fundus below umbilicus, Incision C/D/I   EXTREMITIES: Non tender, trace edema appropriate for post-pregnancy, no erythema     Lab Results: I have reviewed the following results:  Lab Results   Component Value Date    WBC 15.26 (H) 2025    HGB 9.1 (L) 2025    HCT 28.1 (L) 2025    MCV 92 " 03/01/2025     03/01/2025

## 2025-03-03 NOTE — PLAN OF CARE
Problem: POSTPARTUM  Goal: Experiences normal postpartum course  Description: INTERVENTIONS:  - Monitor maternal vital signs  - Assess uterine involution and lochia  Outcome: Progressing  Goal: Appropriate maternal -  bonding  Description: INTERVENTIONS:  - Identify family support  - Assess for appropriate maternal/infant bonding   -Encourage maternal/infant bonding opportunities  - Referral to  or  as needed  Outcome: Progressing  Goal: Establishment of infant feeding pattern  Description: INTERVENTIONS:  - Assess breast/bottle feeding  - Refer to lactation as needed  Outcome: Progressing  Goal: Incision(s), wounds(s) or drain site(s) healing without S/S of infection  Description: INTERVENTIONS  - Assess and document dressing, incision, wound bed, drain sites and surrounding tissue  - Provide patient and family education    Outcome: Progressing     Problem: PAIN - ADULT  Goal: Verbalizes/displays adequate comfort level or baseline comfort level  Description: Interventions:  - Encourage patient to monitor pain and request assistance  - Assess pain using appropriate pain scale  - Administer analgesics based on type and severity of pain and evaluate response  - Implement non-pharmacological measures as appropriate and evaluate response  - Consider cultural and social influences on pain and pain management  - Notify physician/advanced practitioner if interventions unsuccessful or patient reports new pain  Outcome: Progressing     Problem: INFECTION - ADULT  Goal: Absence or prevention of progression during hospitalization  Description: INTERVENTIONS:  - Assess and monitor for signs and symptoms of infection  - Monitor lab/diagnostic results  - Monitor all insertion sites, i.e. indwelling lines, tubes, and drains  - Monitor endotracheal if appropriate and nasal secretions for changes in amount and color  - [place] appropriate cooling/warming therapies per order  - Administer  medications as ordered  - Instruct and encourage patient and family to use good hand hygiene technique  - Identify and instruct in appropriate isolation precautions for identified infection/condition  Outcome: Progressing  Goal: Absence of fever/infection during neutropenic period  Description: INTERVENTIONS:  - Monitor WBC    Outcome: Progressing     Problem: SAFETY ADULT  Goal: Patient will remain free of falls  Description: INTERVENTIONS:  - Keep Call bell within reach  - Keep bed low and locked with side rails adjusted as appropriate  - Keep care items and personal belongings within reach  - Initiate and maintain comfort rounds  - Apply yellow socks and bracelet for high fall risk patients  - Consider moving patient to room near nurses station  Outcome: Progressing  Goal: Maintain or return to baseline ADL function  Description: INTERVENTIONS:  - Provide patient education as appropriate  Outcome: Progressing  Goal: Maintains/Returns to pre admission functional level  Description: INTERVENTIONS:  - Record patient progress and toleration of activity level   Outcome: Progressing     Problem: Knowledge Deficit  Goal: Patient/family/caregiver demonstrates understanding of disease process, treatment plan, medications, and discharge instructions  Description: Complete learning assessment and assess knowledge base.  Interventions:  - Provide teaching at level of understanding  - Provide teaching via preferred learning methods  Outcome: Progressing     Problem: DISCHARGE PLANNING  Goal: Discharge to home or other facility with appropriate resources  Description: INTERVENTIONS:  - Identify barriers to discharge w/patient and caregiver  - Arrange for needed discharge resources and transportation as appropriate  - Identify discharge learning needs (meds, wound care, etc.)  - Arrange for interpretive services to assist at discharge as needed  - Refer to Case Management Department for coordinating discharge planning if the  patient needs post-hospital services based on physician/advanced practitioner order or complex needs related to functional status, cognitive ability, or social support system  Outcome: Progressing

## 2025-03-03 NOTE — TELEPHONE ENCOUNTER
Pt called to schedule 1 week pp and 3 week pp. Pt delivered   with Dr Oglesby. Pt made aware of message sent.

## 2025-03-08 LAB — PLACENTA IN STORAGE: NORMAL

## 2025-03-10 ENCOUNTER — TELEPHONE (OUTPATIENT)
Dept: OBGYN CLINIC | Facility: MEDICAL CENTER | Age: 30
End: 2025-03-10

## 2025-03-10 NOTE — TELEPHONE ENCOUNTER
POSTPARTUM PHONE CALL ASSESSMENT    Date of Delivery: 25  Delivering Provider: Dr. Oglesby  Mode:   Delivery Notes/Complications: H/o classical      Do you still have bleeding/pain? Patient reports bleeding has subsided. Denies any pain. Reports no issues with incision.   Regular BMs/Urination? Yes.  Breastfeeding/Formula/Both? Breastfeeding.  How are you doing emotionally? Patient reports to be doing well emotionally.   Do you have any other questions or concerns for us or your provider? No.  Have you scheduled the pediatrician appointment with pediatrician? Yes. Baby had pediatrician last Tuesday.    Do you have a postpartum visit scheduled? Yes.   Date scheduled: 3/27 Provider: Dr. Oglesby

## 2025-03-25 NOTE — PROGRESS NOTES
Post partum Visit       C section: 2/28/25 female - 8.15 pound,s     2.  Breast feeding - exclusive or supplementation    Breast pain or mastitis   Baby and me for lactation     3.  Post partum depression screening    Risk - low    Support at home [facility] center    4.  Sex - not yet     5.  Contraception / future fertility - condoms     6.  Return to work - aug     7.  Weight    Starting  182   Delivery 201   Total gain 19     Current - 189    8.  Annual    Pap  5/3/23 neg   Next due may     9.  Weakness in the uterine wall at the previous area of the classical c section   Recommend if gets pregnant again to make sure we deliver again prior to any onset of labor.

## 2025-03-27 ENCOUNTER — POSTPARTUM VISIT (OUTPATIENT)
Dept: OBGYN CLINIC | Facility: MEDICAL CENTER | Age: 30
End: 2025-03-27

## 2025-03-27 VITALS
HEIGHT: 72 IN | SYSTOLIC BLOOD PRESSURE: 110 MMHG | BODY MASS INDEX: 25.6 KG/M2 | DIASTOLIC BLOOD PRESSURE: 60 MMHG | WEIGHT: 189 LBS

## 2025-03-27 DIAGNOSIS — Z98.891 HISTORY OF CLASSICAL CESAREAN SECTION: Primary | ICD-10-CM

## 2025-03-27 PROCEDURE — 99024 POSTOP FOLLOW-UP VISIT: CPT | Performed by: OBSTETRICS & GYNECOLOGY

## 2025-04-02 ENCOUNTER — OFFICE VISIT (OUTPATIENT)
Dept: URGENT CARE | Facility: CLINIC | Age: 30
End: 2025-04-02
Payer: COMMERCIAL

## 2025-04-02 VITALS
HEART RATE: 79 BPM | SYSTOLIC BLOOD PRESSURE: 122 MMHG | DIASTOLIC BLOOD PRESSURE: 82 MMHG | RESPIRATION RATE: 16 BRPM | TEMPERATURE: 97.8 F | OXYGEN SATURATION: 98 %

## 2025-04-02 DIAGNOSIS — R30.0 DYSURIA: Primary | ICD-10-CM

## 2025-04-02 LAB
SL AMB  POCT GLUCOSE, UA: ABNORMAL
SL AMB LEUKOCYTE ESTERASE,UA: ABNORMAL
SL AMB POCT BILIRUBIN,UA: ABNORMAL
SL AMB POCT BLOOD,UA: ABNORMAL
SL AMB POCT CLARITY,UA: ABNORMAL
SL AMB POCT COLOR,UA: YELLOW
SL AMB POCT KETONES,UA: ABNORMAL
SL AMB POCT NITRITE,UA: ABNORMAL
SL AMB POCT PH,UA: 7
SL AMB POCT SPECIFIC GRAVITY,UA: 1.01
SL AMB POCT URINE HCG: NORMAL
SL AMB POCT URINE PROTEIN: ABNORMAL
SL AMB POCT UROBILINOGEN: 0.2

## 2025-04-02 PROCEDURE — 81002 URINALYSIS NONAUTO W/O SCOPE: CPT | Performed by: PHYSICIAN ASSISTANT

## 2025-04-02 PROCEDURE — G0382 LEV 3 HOSP TYPE B ED VISIT: HCPCS | Performed by: PHYSICIAN ASSISTANT

## 2025-04-02 PROCEDURE — 81025 URINE PREGNANCY TEST: CPT | Performed by: PHYSICIAN ASSISTANT

## 2025-04-02 RX ORDER — CEPHALEXIN 500 MG/1
500 CAPSULE ORAL EVERY 12 HOURS SCHEDULED
Qty: 10 CAPSULE | Refills: 0 | Status: SHIPPED | OUTPATIENT
Start: 2025-04-02 | End: 2025-04-07

## 2025-04-02 NOTE — PROGRESS NOTES
[facility]    NAME: Ailyn Muro is a 29 y.o. female  : 1995    MRN: [id number]  DATE: 2025  TIME: 9:49 AM    Assessment and Plan   Dysuria [R30.0]  1. Dysuria  POCT urine dip    POCT urine HCG    Urine culture    Urine culture    cephalexin (KEFLEX) 500 mg capsule          Patient Instructions     Patient Instructions   Take antibiotic as directed.    Push fluids.    Contact your PCP if not improving over next 3-5 days to arrange follow up appointment for as soon as possible.    If you have recurrent urinary tract problems, please follow up with your PCP and / or urologist for further evaluation.      If you develop worsening symptoms with associated fever, back pain, abdominal pain, nausea with vomiting, please proceed to emergency room for further evaluation.    Urine will be sent for culture.  Those results take approximately 48 to 72 hours to return.  You may access those test results through [facility] [employer].    Please note:  If tests have been performed at [facility], our office will contact you with results if changes need to be made to the care plan discussed with you at the visit.  You can review your full results on [facility] [employer].           Chief Complaint     Chief Complaint   Patient presents with    Possible UTI     Coming in with s/s of burning (especially at the end of urination), aching back. Denies fevers. Had a cath 4 weeks ago from csection.        History of Present Illness   Ailyn Muro presents to the clinic c/o  Patient comes in with increased burning on urination worse over the last couple days.  Did have catheter approximately 4 weeks ago when she was admitted to the hospital for a .    No fever or chills.  Some lower abdominal discomfort noted.  No flank pain.    Is breast-feeding.        Review of Systems   Review of Systems   Constitutional: Negative.    Respiratory: Negative.     Cardiovascular: Negative.    Gastrointestinal:   Positive for abdominal pain. Negative for nausea and vomiting.   Genitourinary:  Positive for difficulty urinating, dysuria, frequency and urgency. Negative for flank pain.        Reports having a little bit of orange tinge on toilet paper recently.  Was not sure if that was blood.       Current Medications     Long-Term Medications   Medication Sig Dispense Refill    benzocaine-menthol-lanolin-aloe (DERMOPLAST) 20-0.5 % topical spray Apply 1 Application topically every 6 (six) hours as needed for mild pain (Patient not taking: Reported on 3/27/2025)      famotidine (PEPCID) 20 mg tablet TAKE 1 TABLET (20 MG TOTAL) BY MOUTH 2 (TWO) TIMES A DAY AS NEEDED FOR HEARTBURN OR INDIGESTION (Patient not taking: Reported on 3/27/2025) 180 tablet 1    hydrocortisone 1 % cream Apply 1 Application topically daily as needed for irritation (Patient not taking: Reported on 3/27/2025)      hydrOXYzine HCL (ATARAX) 25 mg tablet Take 1 tablet (25 mg total) by mouth every 6 (six) hours as needed for itching 30 tablet 3    ibuprofen (MOTRIN) 600 mg tablet Take 1 tablet (600 mg total) by mouth every 6 (six) hours (Patient not taking: Reported on 3/27/2025) 30 tablet 0    witch hazel-glycerin (TUCKS) topical pad Apply 1 Pad topically every 4 (four) hours as needed for irritation (Patient not taking: Reported on 3/27/2025)         Current Allergies     Allergies as of 04/02/2025 - Reviewed 04/02/2025   Allergen Reaction Noted    Clindamycin Throat Swelling     Sumatriptan Nausea Only and Throat Swelling 08/09/2016    Azithromycin GI Intolerance and Vomiting 07/18/2022    Penicillins Rash 07/05/2012          The following portions of the patient's history were reviewed and updated as appropriate: allergies, current medications, past family history, past medical history, past social history, past surgical history and problem list.  Past Medical History:   Diagnosis Date    Common wart     Resolved 7/29/2016     Ganglion cyst     Resolved  2016     Migraine with aura     Resolved 2016     Small intestinal bacterial overgrowth (SIBO)     Varicella     vaccine     Past Surgical History:   Procedure Laterality Date    DENTAL SURGERY      CT  DELIVERY ONLY N/A 2022    Procedure:  SECTION ();  Surgeon: Yandy Velasquez MD;  Location: [facility] ;  Service: Obstetrics    CT  DELIVERY ONLY N/A 2025    Procedure:  SECTION () REPEAT;  Surgeon: Lamonte Oglesby MD;  Location: [facility] ;  Service: Obstetrics     Family History   Problem Relation Age of Onset    Arthritis Mother     Deep vein thrombosis Father     No Known Problems Sister     Colon cancer Maternal Grandmother     Prostate cancer Maternal Grandfather     Skin cancer Maternal Grandfather     Hypertension Maternal Grandfather     Cancer Maternal Grandfather         bladder cancer     Ovarian cancer Paternal Grandmother     No Known Problems Paternal Grandfather     Breast cancer Maternal Aunt         unsure age        Objective   /82   Pulse 79   Temp 97.8 °F (36.6 °C) (Tympanic)   Resp 16   LMP 2024 (Exact Date)   SpO2 98%   Breastfeeding Yes   Patient's last menstrual period was 2024 (exact date).       Physical Exam     Physical Exam  Vitals and nursing note reviewed.   Constitutional:       General: She is not in acute distress.     Appearance: She is well-developed. She is not ill-appearing, toxic-appearing or diaphoretic.   Cardiovascular:      Rate and Rhythm: Normal rate and regular rhythm.      Heart sounds: Normal heart sounds. No murmur heard.     No friction rub. No gallop.   Pulmonary:      Effort: Pulmonary effort is normal. No respiratory distress.      Breath sounds: Normal breath sounds. No stridor. No wheezing, rhonchi or rales.   Abdominal:      Palpations: Abdomen is soft.      Tenderness: There is abdominal tenderness. There is no right CVA tenderness, left CVA tenderness, guarding or rebound.       Comments: Suprapubic TTP.   scar appears to be healing without signs of local infection.   Skin:     General: Skin is warm and dry.   Neurological:      General: No focal deficit present.      Mental Status: She is alert and oriented to person, place, and time.   Psychiatric:         Mood and Affect: Mood normal.         Behavior: Behavior normal.

## 2025-04-02 NOTE — PATIENT INSTRUCTIONS
Take antibiotic as directed.    Push fluids.    Contact your PCP if not improving over next 3-5 days to arrange follow up appointment for as soon as possible.    If you have recurrent urinary tract problems, please follow up with your PCP and / or urologist for further evaluation.      If you develop worsening symptoms with associated fever, back pain, abdominal pain, nausea with vomiting, please proceed to emergency room for further evaluation.    Urine will be sent for culture.  Those results take approximately 48 to 72 hours to return.  You may access those test results through [employer].    Please note:  If tests have been performed at [facility], our office will contact you with results if changes need to be made to the care plan discussed with you at the visit.  You can review your full results on [employer].

## 2025-06-16 ENCOUNTER — OFFICE VISIT (OUTPATIENT)
Dept: FAMILY MEDICINE CLINIC | Facility: CLINIC | Age: 30
End: 2025-06-16
Payer: COMMERCIAL

## 2025-06-16 VITALS
OXYGEN SATURATION: 97 % | DIASTOLIC BLOOD PRESSURE: 64 MMHG | TEMPERATURE: 97.6 F | BODY MASS INDEX: 25.73 KG/M2 | WEIGHT: 190 LBS | SYSTOLIC BLOOD PRESSURE: 102 MMHG | HEIGHT: 72 IN | HEART RATE: 94 BPM

## 2025-06-16 DIAGNOSIS — L98.9 SKIN LESION: Primary | ICD-10-CM

## 2025-06-16 PROCEDURE — 99213 OFFICE O/P EST LOW 20 MIN: CPT | Performed by: FAMILY MEDICINE

## 2025-06-16 PROCEDURE — 87070 CULTURE OTHR SPECIMN AEROBIC: CPT | Performed by: FAMILY MEDICINE

## 2025-06-16 PROCEDURE — 87205 SMEAR GRAM STAIN: CPT | Performed by: FAMILY MEDICINE

## 2025-06-16 RX ORDER — CEPHALEXIN 500 MG/1
500 CAPSULE ORAL EVERY 12 HOURS SCHEDULED
Qty: 14 CAPSULE | Refills: 0 | Status: SHIPPED | OUTPATIENT
Start: 2025-06-16 | End: 2025-06-23

## 2025-06-16 NOTE — PROGRESS NOTES
Name: Ailyn Muro      : 1995      MRN: [id number]  Encounter Provider: Sultana Bennett DO  Encounter Date: 2025   Encounter department: [facility]  :  Assessment & Plan  Skin lesion  Lesion on upper outer quadrant of right breast.  Small amount of yellowish drainage. Culture sent.   Orders:  •  cephalexin (KEFLEX) 500 mg capsule; Take 1 capsule (500 mg total) by mouth every 12 (twelve) hours for 7 days  •  Wound culture and Gram stain         Will call with culture results. Finish Cephalexin unless there is antibiotic resistance noted on sensitivities.    History of Present Illness   Patient is seen for lesion on right breast. Has been present for about a week. Started off as a purplish/blackish raised spot (patient had picture on phone). Now scaly and slightly red in the center. It appears to get better and then starts to get sore again.  She is breast feeding.      Review of Systems   Constitutional:  Negative for chills and fever.   Skin:  Positive for wound.   Hematological:  Negative for adenopathy.       Objective   /64 (BP Location: Left arm, Patient Position: Sitting, Cuff Size: Adult)   Pulse 94   Temp 97.6 °F (36.4 °C) (Temporal)   Ht 6' (1.829 m)   Wt 86.2 kg (190 lb)   SpO2 97%   BMI 25.77 kg/m²      Physical Exam  Constitutional:       General: She is not in acute distress.  HENT:      Head: Normocephalic.   Lymphadenopathy:      Upper Body:      Right upper body: No axillary adenopathy.     Skin:     General: Skin is warm and dry.      Findings: Lesion (scaly, slight erythema in center. Able to express some yellowish drainage with pressure.) present.     Neurological:      Mental Status: She is alert and oriented to person, place, and time.     Psychiatric:         Mood and Affect: Mood normal.

## 2025-06-18 ENCOUNTER — RESULTS FOLLOW-UP (OUTPATIENT)
Dept: FAMILY MEDICINE CLINIC | Facility: CLINIC | Age: 30
End: 2025-06-18

## 2025-06-19 LAB
BACTERIA WND AEROBE CULT: NORMAL
GRAM STN SPEC: NORMAL

## 2025-06-27 ENCOUNTER — OFFICE VISIT (OUTPATIENT)
Age: 30
End: 2025-06-27
Payer: COMMERCIAL

## 2025-06-27 VITALS
TEMPERATURE: 98.6 F | DIASTOLIC BLOOD PRESSURE: 60 MMHG | BODY MASS INDEX: 25.33 KG/M2 | SYSTOLIC BLOOD PRESSURE: 106 MMHG | HEART RATE: 92 BPM | OXYGEN SATURATION: 99 % | HEIGHT: 72 IN | WEIGHT: 187 LBS

## 2025-06-27 DIAGNOSIS — R19.5 MUCUS IN STOOL: ICD-10-CM

## 2025-06-27 DIAGNOSIS — R14.0 ABDOMINAL BLOATING: Primary | ICD-10-CM

## 2025-06-27 PROCEDURE — 99213 OFFICE O/P EST LOW 20 MIN: CPT | Performed by: DIETITIAN, REGISTERED

## 2025-06-27 NOTE — PROGRESS NOTES
Name: Ailyn Muro      : 1995      MRN: [id number]  Encounter Provider: Lamonte Donato PA-C  Encounter Date: 2025   Encounter department: [facility]  :  Assessment & Plan  Abdominal bloating  Mucus in stool  Patient is currently about 4 months postpartum and her previous GERD and constipation that she dealt with while pregnant have improved.  She deals with intermittent trapped gas in the esophagus/chest (ie when talking/eating and swallowing air) but this has decreased in frequency.  Bowel habits have improved and she typically has 1 BM daily.  However, sometimes she has frequent tenesmus/urgency and when she goes to the bathroom, passes mucus with no stool.  She denies any blood in the stool.  She also has abdominal bloating/discomfort that worsens as the day goes on, and Gas-X helps a bit.  Previously, Xifaxan and Neomycin treatment helped significantly with abdominal bloating.    -Can consider re-treatment with Xifaxan/Neomycin in the future if needed.  Unfortunately not recommended while breastfeeding.  -Given intermittent symptoms of tenesmus, urgency, mucus in the stool, as well as history of rectal bleeding last year, consider colonoscopy for further evaluation.  Patient will consider this for now, given she is currently breastfeeding.  Can discuss again at next office visit.   -Continue Gas-X as needed.  -Recommend trial of either peppermint oil supplement (such as Ibgard) or Beano prior to meals to see if this can help with abdominal bloating/discomfort.  -Follow up in ~6 months, unless needed sooner.             History of Present Illness   Ailyn Muro is a 29 y.o. female with history of GERD, abdominal pain, bloating, blood/mucus in the stool who presents for follow-up.  Last seen in 2025.    Patient is currently about 4 months postpartum and her previous GERD and constipation that she dealt with while pregnant have improved.   She deals with intermittent trapped gas in the esophagus/chest (ie when talking/eating and swallowing air) but this has decreased in frequency.  Bowel habits have improved and she typically has 1 BM daily.  However, sometimes she has frequent tenesmus/urgency and when she goes to the bathroom, passes mucus with no stool.  She denies any blood in the stool.  She also has abdominal bloating/discomfort that worsens as the day goes on, and Gas-X helps a bit.  Previously, Xifaxan and Neomycin treatment helped significantly with abdominal bloating.      HPI    Review of Systems A complete review of systems is negative other than that noted above in the HPI.      Current Medications[1]  Objective   /60 (BP Location: Left arm, Patient Position: Sitting, Cuff Size: Adult)   Pulse 92   Temp 98.6 °F (37 °C) (Tympanic)   Ht 6' (1.829 m)   Wt 84.8 kg (187 lb)   SpO2 99%   BMI 25.36 kg/m²     Physical Exam  Vitals reviewed.   Constitutional:       Appearance: Normal appearance.   HENT:      Head: Normocephalic and atraumatic.     Eyes:      Conjunctiva/sclera: Conjunctivae normal.     Pulmonary:      Effort: Pulmonary effort is normal.     Skin:     Coloration: Skin is not jaundiced or pale.     Neurological:      Mental Status: She is alert.     Psychiatric:         Mood and Affect: Mood normal.         Behavior: Behavior normal.          Lab Results: I personally reviewed relevant lab results.                    [1]  Current Outpatient Medications   Medication Sig Dispense Refill   • Prenatal Vit-Fe Fumarate-FA (PRENATAL VITAMIN PO) Take by mouth       No current facility-administered medications for this visit.

## 2025-08-15 ENCOUNTER — OFFICE VISIT (OUTPATIENT)
Dept: FAMILY MEDICINE CLINIC | Facility: CLINIC | Age: 30
End: 2025-08-15
Payer: COMMERCIAL

## 2025-08-18 ENCOUNTER — TELEPHONE (OUTPATIENT)
Age: 30
End: 2025-08-18

## (undated) DEVICE — GLOVE SRG BIOGEL ECLIPSE 6.5

## (undated) DEVICE — 3M™ STERI-STRIP™ REINFORCED ADHESIVE SKIN CLOSURES, R1547, 1/2 IN X 4 IN (12 MM X 100 MM), 6 STRIPS/ENVELOPE: Brand: 3M™ STERI-STRIP™

## (undated) DEVICE — SKIN MARKER DUAL TIP WITH RULER CAP, FLEXIBLE RULER AND LABELS: Brand: DEVON

## (undated) DEVICE — SUT MONOCRYL 4-0 PS-2 27 IN Y426H

## (undated) DEVICE — SUT MONOCRYL 0 CTX 36 IN Y398H

## (undated) DEVICE — TELFA NON-ADHERENT ABSORBENT DRESSING: Brand: TELFA

## (undated) DEVICE — SUT VICRYL 0 CTX 36 IN J978H

## (undated) DEVICE — PACK C-SECTION PBDS

## (undated) DEVICE — SUT VICRYL 2-0 SH 27 IN UNDYED J417H

## (undated) DEVICE — GAUZE SPONGES,16 PLY: Brand: CURITY

## (undated) DEVICE — CHLORAPREP HI-LITE 26ML ORANGE

## (undated) DEVICE — SUT VICRYL 0 CT-1 36 IN J946H

## (undated) DEVICE — Device

## (undated) DEVICE — SUT VICRYL 0 CT-1 27 IN J260H

## (undated) DEVICE — ABDOMINAL PAD: Brand: DERMACEA

## (undated) DEVICE — SPONGE LAP 18 X 18 IN STRL RFD